# Patient Record
Sex: MALE | Race: WHITE | NOT HISPANIC OR LATINO
[De-identification: names, ages, dates, MRNs, and addresses within clinical notes are randomized per-mention and may not be internally consistent; named-entity substitution may affect disease eponyms.]

---

## 2017-01-09 ENCOUNTER — APPOINTMENT (OUTPATIENT)
Dept: UROLOGY | Facility: CLINIC | Age: 72
End: 2017-01-09

## 2017-01-18 ENCOUNTER — FORM ENCOUNTER (OUTPATIENT)
Age: 72
End: 2017-01-18

## 2017-01-18 LAB
APPEARANCE: ABNORMAL
BACTERIA UR CULT: ABNORMAL
BACTERIA: NEGATIVE
BILIRUBIN URINE: NEGATIVE
BLOOD URINE: NEGATIVE
COLOR: ABNORMAL
GLUCOSE QUALITATIVE U: NORMAL MG/DL
HYALINE CASTS: 0 /LPF
KETONES URINE: NEGATIVE
LEUKOCYTE ESTERASE URINE: NEGATIVE
MICROSCOPIC-UA: NORMAL
NITRITE URINE: NEGATIVE
PH URINE: 5.5
PROTEIN URINE: NEGATIVE MG/DL
RED BLOOD CELLS URINE: 0 /HPF
SPECIFIC GRAVITY URINE: 1.03
SQUAMOUS EPITHELIAL CELLS: 1 /HPF
UROBILINOGEN URINE: NORMAL MG/DL
WHITE BLOOD CELLS URINE: 0 /HPF

## 2017-01-19 ENCOUNTER — OUTPATIENT (OUTPATIENT)
Dept: OUTPATIENT SERVICES | Facility: HOSPITAL | Age: 72
LOS: 1 days | End: 2017-01-19
Payer: MEDICARE

## 2017-01-19 ENCOUNTER — APPOINTMENT (OUTPATIENT)
Dept: CT IMAGING | Facility: IMAGING CENTER | Age: 72
End: 2017-01-19

## 2017-01-19 DIAGNOSIS — Z96.643 PRESENCE OF ARTIFICIAL HIP JOINT, BILATERAL: Chronic | ICD-10-CM

## 2017-01-19 DIAGNOSIS — Z00.8 ENCOUNTER FOR OTHER GENERAL EXAMINATION: ICD-10-CM

## 2017-01-19 PROCEDURE — 74174 CTA ABD&PLVS W/CONTRAST: CPT

## 2017-01-27 ENCOUNTER — APPOINTMENT (OUTPATIENT)
Dept: INTERNAL MEDICINE | Facility: CLINIC | Age: 72
End: 2017-01-27

## 2017-01-27 VITALS
HEIGHT: 68 IN | WEIGHT: 198 LBS | OXYGEN SATURATION: 97 % | SYSTOLIC BLOOD PRESSURE: 100 MMHG | DIASTOLIC BLOOD PRESSURE: 80 MMHG | HEART RATE: 67 BPM | BODY MASS INDEX: 30.01 KG/M2 | TEMPERATURE: 97.7 F

## 2017-01-27 DIAGNOSIS — A49.9 URINARY TRACT INFECTION, SITE NOT SPECIFIED: ICD-10-CM

## 2017-01-27 DIAGNOSIS — Z11.59 ENCOUNTER FOR SCREENING FOR OTHER VIRAL DISEASES: ICD-10-CM

## 2017-01-27 DIAGNOSIS — N39.0 URINARY TRACT INFECTION, SITE NOT SPECIFIED: ICD-10-CM

## 2017-01-27 LAB
BILIRUB UR QL STRIP: NORMAL
GLUCOSE UR-MCNC: NORMAL
HCG UR QL: 0.2 EU/DL
HGB UR QL STRIP.AUTO: NORMAL
KETONES UR-MCNC: NORMAL
LEUKOCYTE ESTERASE UR QL STRIP: NORMAL
NITRITE UR QL STRIP: NORMAL
PH UR STRIP: 5.5
PROT UR STRIP-MCNC: NORMAL
SP GR UR STRIP: 1.02

## 2017-01-27 RX ORDER — SULFAMETHOXAZOLE AND TRIMETHOPRIM 800; 160 MG/1; MG/1
800-160 TABLET ORAL
Qty: 10 | Refills: 0 | Status: COMPLETED | COMMUNITY
Start: 2017-01-18 | End: 2017-01-27

## 2017-01-30 LAB
25(OH)D3 SERPL-MCNC: 39.3 NG/ML
ALBUMIN SERPL ELPH-MCNC: 4.3 G/DL
ALP BLD-CCNC: 57 U/L
ALT SERPL-CCNC: 26 U/L
ANION GAP SERPL CALC-SCNC: 14 MMOL/L
AST SERPL-CCNC: 19 U/L
BASOPHILS # BLD AUTO: 0.04 K/UL
BASOPHILS NFR BLD AUTO: 0.6 %
BILIRUB SERPL-MCNC: 0.6 MG/DL
BUN SERPL-MCNC: 24 MG/DL
CALCIUM SERPL-MCNC: 9.1 MG/DL
CHLORIDE SERPL-SCNC: 104 MMOL/L
CHOLEST SERPL-MCNC: 182 MG/DL
CHOLEST/HDLC SERPL: 4 RATIO
CO2 SERPL-SCNC: 21 MMOL/L
CREAT SERPL-MCNC: 1.31 MG/DL
EOSINOPHIL # BLD AUTO: 0.15 K/UL
EOSINOPHIL NFR BLD AUTO: 2.3 %
GLUCOSE SERPL-MCNC: 101 MG/DL
HBA1C MFR BLD HPLC: 5.7 %
HCT VFR BLD CALC: 41.6 %
HDLC SERPL-MCNC: 45 MG/DL
HGB BLD-MCNC: 13.8 G/DL
IMM GRANULOCYTES NFR BLD AUTO: 0.2 %
LDLC SERPL CALC-MCNC: 122 MG/DL
LYMPHOCYTES # BLD AUTO: 1.42 K/UL
LYMPHOCYTES NFR BLD AUTO: 21.8 %
MAN DIFF?: NORMAL
MCHC RBC-ENTMCNC: 31 PG
MCHC RBC-ENTMCNC: 33.2 GM/DL
MCV RBC AUTO: 93.5 FL
MONOCYTES # BLD AUTO: 0.53 K/UL
MONOCYTES NFR BLD AUTO: 8.1 %
NEUTROPHILS # BLD AUTO: 4.36 K/UL
NEUTROPHILS NFR BLD AUTO: 67 %
PLATELET # BLD AUTO: 179 K/UL
POTASSIUM SERPL-SCNC: 4.4 MMOL/L
PROT SERPL-MCNC: 7.6 G/DL
PSA SERPL-MCNC: 1.6 NG/ML
RBC # BLD: 4.45 M/UL
RBC # FLD: 13.7 %
SODIUM SERPL-SCNC: 139 MMOL/L
T4 FREE SERPL-MCNC: 1.1 NG/DL
TRIGL SERPL-MCNC: 76 MG/DL
TSH SERPL-ACNC: 6.23 UIU/ML
WBC # FLD AUTO: 6.51 K/UL

## 2017-04-21 ENCOUNTER — APPOINTMENT (OUTPATIENT)
Dept: OPHTHALMOLOGY | Facility: CLINIC | Age: 72
End: 2017-04-21

## 2017-04-21 DIAGNOSIS — H26.9 UNSPECIFIED CATARACT: ICD-10-CM

## 2017-04-23 ENCOUNTER — RESULT REVIEW (OUTPATIENT)
Age: 72
End: 2017-04-23

## 2017-04-24 ENCOUNTER — OUTPATIENT (OUTPATIENT)
Dept: OUTPATIENT SERVICES | Facility: HOSPITAL | Age: 72
LOS: 1 days | End: 2017-04-24
Payer: MEDICARE

## 2017-04-24 ENCOUNTER — APPOINTMENT (OUTPATIENT)
Dept: GASTROENTEROLOGY | Facility: HOSPITAL | Age: 72
End: 2017-04-24

## 2017-04-24 DIAGNOSIS — Z80.0 FAMILY HISTORY OF MALIGNANT NEOPLASM OF DIGESTIVE ORGANS: ICD-10-CM

## 2017-04-24 DIAGNOSIS — Z96.643 PRESENCE OF ARTIFICIAL HIP JOINT, BILATERAL: Chronic | ICD-10-CM

## 2017-04-24 DIAGNOSIS — Z12.11 ENCOUNTER FOR SCREENING FOR MALIGNANT NEOPLASM OF COLON: ICD-10-CM

## 2017-04-24 PROCEDURE — 45380 COLONOSCOPY AND BIOPSY: CPT | Mod: PT

## 2017-04-24 PROCEDURE — 88305 TISSUE EXAM BY PATHOLOGIST: CPT | Mod: 26

## 2017-04-24 PROCEDURE — 88305 TISSUE EXAM BY PATHOLOGIST: CPT

## 2017-04-24 PROCEDURE — 43235 EGD DIAGNOSTIC BRUSH WASH: CPT

## 2017-04-24 PROCEDURE — 45380 COLONOSCOPY AND BIOPSY: CPT

## 2017-04-25 LAB — SURGICAL PATHOLOGY STUDY: SIGNIFICANT CHANGE UP

## 2017-07-05 ENCOUNTER — RX RENEWAL (OUTPATIENT)
Age: 72
End: 2017-07-05

## 2017-07-06 ENCOUNTER — MEDICATION RENEWAL (OUTPATIENT)
Age: 72
End: 2017-07-06

## 2017-09-13 ENCOUNTER — APPOINTMENT (OUTPATIENT)
Dept: OPHTHALMOLOGY | Facility: CLINIC | Age: 72
End: 2017-09-13

## 2017-09-28 ENCOUNTER — APPOINTMENT (OUTPATIENT)
Dept: VASCULAR SURGERY | Facility: CLINIC | Age: 72
End: 2017-09-28
Payer: MEDICARE

## 2017-09-28 VITALS
SYSTOLIC BLOOD PRESSURE: 119 MMHG | BODY MASS INDEX: 30.16 KG/M2 | HEIGHT: 68 IN | TEMPERATURE: 97.7 F | HEART RATE: 73 BPM | WEIGHT: 199 LBS | DIASTOLIC BLOOD PRESSURE: 79 MMHG

## 2017-09-28 PROCEDURE — 93978 VASCULAR STUDY: CPT

## 2017-09-28 PROCEDURE — 99213 OFFICE O/P EST LOW 20 MIN: CPT | Mod: 25

## 2017-10-02 ENCOUNTER — APPOINTMENT (OUTPATIENT)
Dept: OPHTHALMOLOGY | Facility: CLINIC | Age: 72
End: 2017-10-02
Payer: MEDICARE

## 2017-10-02 DIAGNOSIS — H26.9 UNSPECIFIED CATARACT: ICD-10-CM

## 2017-10-02 DIAGNOSIS — Z98.890 OTHER SPECIFIED POSTPROCEDURAL STATES: ICD-10-CM

## 2017-10-02 PROCEDURE — 92136 OPHTHALMIC BIOMETRY: CPT

## 2017-10-02 PROCEDURE — 92014 COMPRE OPH EXAM EST PT 1/>: CPT

## 2017-10-02 PROCEDURE — 92025 CPTRIZED CORNEAL TOPOGRAPHY: CPT

## 2017-12-29 ENCOUNTER — APPOINTMENT (OUTPATIENT)
Dept: INTERNAL MEDICINE | Facility: CLINIC | Age: 72
End: 2017-12-29
Payer: MEDICARE

## 2017-12-29 VITALS
TEMPERATURE: 97.6 F | BODY MASS INDEX: 30.61 KG/M2 | DIASTOLIC BLOOD PRESSURE: 80 MMHG | WEIGHT: 195 LBS | HEART RATE: 77 BPM | OXYGEN SATURATION: 97 % | HEIGHT: 67 IN | SYSTOLIC BLOOD PRESSURE: 110 MMHG

## 2017-12-29 PROCEDURE — 99213 OFFICE O/P EST LOW 20 MIN: CPT | Mod: 25

## 2017-12-29 PROCEDURE — G0008: CPT

## 2017-12-29 PROCEDURE — 90662 IIV NO PRSV INCREASED AG IM: CPT

## 2017-12-29 RX ORDER — POTASSIUM CITRATE 15 MEQ/1
15 MEQ TABLET, EXTENDED RELEASE ORAL
Qty: 180 | Refills: 3 | Status: DISCONTINUED | COMMUNITY
Start: 2017-01-09 | End: 2017-12-29

## 2017-12-29 RX ORDER — FINASTERIDE 5 MG/1
5 TABLET, FILM COATED ORAL DAILY
Qty: 90 | Refills: 3 | Status: DISCONTINUED | COMMUNITY
Start: 2017-01-09 | End: 2017-12-29

## 2018-02-20 ENCOUNTER — RX RENEWAL (OUTPATIENT)
Age: 73
End: 2018-02-20

## 2018-02-20 ENCOUNTER — MEDICATION RENEWAL (OUTPATIENT)
Age: 73
End: 2018-02-20

## 2018-04-02 ENCOUNTER — OUTPATIENT (OUTPATIENT)
Dept: OUTPATIENT SERVICES | Facility: HOSPITAL | Age: 73
LOS: 1 days | End: 2018-04-02
Payer: MEDICARE

## 2018-04-02 ENCOUNTER — APPOINTMENT (OUTPATIENT)
Dept: MRI IMAGING | Facility: CLINIC | Age: 73
End: 2018-04-02
Payer: MEDICARE

## 2018-04-02 DIAGNOSIS — Z96.643 PRESENCE OF ARTIFICIAL HIP JOINT, BILATERAL: Chronic | ICD-10-CM

## 2018-04-02 DIAGNOSIS — Z00.8 ENCOUNTER FOR OTHER GENERAL EXAMINATION: ICD-10-CM

## 2018-04-02 PROCEDURE — 73721 MRI JNT OF LWR EXTRE W/O DYE: CPT

## 2018-04-02 PROCEDURE — 73721 MRI JNT OF LWR EXTRE W/O DYE: CPT | Mod: 26,RT

## 2018-04-09 ENCOUNTER — APPOINTMENT (OUTPATIENT)
Dept: OPHTHALMOLOGY | Facility: CLINIC | Age: 73
End: 2018-04-09

## 2018-04-12 ENCOUNTER — OUTPATIENT (OUTPATIENT)
Dept: OUTPATIENT SERVICES | Facility: HOSPITAL | Age: 73
LOS: 1 days | End: 2018-04-12
Payer: MEDICARE

## 2018-04-12 VITALS
HEART RATE: 63 BPM | RESPIRATION RATE: 16 BRPM | SYSTOLIC BLOOD PRESSURE: 123 MMHG | OXYGEN SATURATION: 93 % | WEIGHT: 199.96 LBS | HEIGHT: 69 IN | DIASTOLIC BLOOD PRESSURE: 80 MMHG | TEMPERATURE: 98 F

## 2018-04-12 DIAGNOSIS — M17.11 UNILATERAL PRIMARY OSTEOARTHRITIS, RIGHT KNEE: ICD-10-CM

## 2018-04-12 DIAGNOSIS — M25.561 PAIN IN RIGHT KNEE: ICD-10-CM

## 2018-04-12 DIAGNOSIS — S83.281D OTHER TEAR OF LATERAL MENISCUS, CURRENT INJURY, RIGHT KNEE, SUBSEQUENT ENCOUNTER: ICD-10-CM

## 2018-04-12 DIAGNOSIS — I71.4 ABDOMINAL AORTIC ANEURYSM, WITHOUT RUPTURE: Chronic | ICD-10-CM

## 2018-04-12 DIAGNOSIS — Z01.818 ENCOUNTER FOR OTHER PREPROCEDURAL EXAMINATION: ICD-10-CM

## 2018-04-12 DIAGNOSIS — Z96.643 PRESENCE OF ARTIFICIAL HIP JOINT, BILATERAL: Chronic | ICD-10-CM

## 2018-04-12 LAB
ANION GAP SERPL CALC-SCNC: 7 MMOL/L — SIGNIFICANT CHANGE UP (ref 5–17)
BUN SERPL-MCNC: 29 MG/DL — HIGH (ref 7–23)
CALCIUM SERPL-MCNC: 8.8 MG/DL — SIGNIFICANT CHANGE UP (ref 8.4–10.5)
CHLORIDE SERPL-SCNC: 106 MMOL/L — SIGNIFICANT CHANGE UP (ref 96–108)
CO2 SERPL-SCNC: 24 MMOL/L — SIGNIFICANT CHANGE UP (ref 22–31)
CREAT SERPL-MCNC: 1.06 MG/DL — SIGNIFICANT CHANGE UP (ref 0.5–1.3)
GLUCOSE SERPL-MCNC: 99 MG/DL — SIGNIFICANT CHANGE UP (ref 70–99)
HCT VFR BLD CALC: 37.8 % — LOW (ref 39–50)
HGB BLD-MCNC: 13.3 G/DL — SIGNIFICANT CHANGE UP (ref 13–17)
MCHC RBC-ENTMCNC: 32 PG — SIGNIFICANT CHANGE UP (ref 27–34)
MCHC RBC-ENTMCNC: 35.2 GM/DL — SIGNIFICANT CHANGE UP (ref 32–36)
MCV RBC AUTO: 91.2 FL — SIGNIFICANT CHANGE UP (ref 80–100)
PLATELET # BLD AUTO: 165 K/UL — SIGNIFICANT CHANGE UP (ref 150–400)
POTASSIUM SERPL-MCNC: 4.2 MMOL/L — SIGNIFICANT CHANGE UP (ref 3.5–5.3)
POTASSIUM SERPL-SCNC: 4.2 MMOL/L — SIGNIFICANT CHANGE UP (ref 3.5–5.3)
RBC # BLD: 4.14 M/UL — LOW (ref 4.2–5.8)
RBC # FLD: 12.4 % — SIGNIFICANT CHANGE UP (ref 10.3–14.5)
SODIUM SERPL-SCNC: 137 MMOL/L — SIGNIFICANT CHANGE UP (ref 135–145)
WBC # BLD: 6.9 K/UL — SIGNIFICANT CHANGE UP (ref 3.8–10.5)
WBC # FLD AUTO: 6.9 K/UL — SIGNIFICANT CHANGE UP (ref 3.8–10.5)

## 2018-04-12 PROCEDURE — G0463: CPT

## 2018-04-12 PROCEDURE — 93005 ELECTROCARDIOGRAM TRACING: CPT

## 2018-04-12 PROCEDURE — 93010 ELECTROCARDIOGRAM REPORT: CPT | Mod: NC

## 2018-04-12 PROCEDURE — 80048 BASIC METABOLIC PNL TOTAL CA: CPT

## 2018-04-12 PROCEDURE — 85027 COMPLETE CBC AUTOMATED: CPT

## 2018-04-12 RX ORDER — CHLORHEXIDINE GLUCONATE 213 G/1000ML
1 SOLUTION TOPICAL ONCE
Qty: 0 | Refills: 0 | Status: DISCONTINUED | OUTPATIENT
Start: 2018-04-12 | End: 2018-04-27

## 2018-04-12 RX ORDER — CHLORHEXIDINE GLUCONATE 213 G/1000ML
1 SOLUTION TOPICAL ONCE
Qty: 0 | Refills: 0 | Status: COMPLETED | OUTPATIENT
Start: 2018-04-24 | End: 2018-04-24

## 2018-04-12 NOTE — H&P PST ADULT - MUSCULOSKELETAL
details… detailed exam no calf tenderness/no joint erythema/decreased ROM due to pain/no joint warmth

## 2018-04-12 NOTE — H&P PST ADULT - PROBLEM SELECTOR PLAN 1
"Operative arthroscopy right knee" on 4/24/18  pre op instructions were reviewed and signed  Patient will obtain medical clearance

## 2018-04-12 NOTE — ASU DISCHARGE PLAN (ADULT/PEDIATRIC). - SPECIAL INSTRUCTIONS
apply ice to operative site 20 minutes on and 20 minutes off for next 48 hours  Pain meds as per MD  Take an over the counter stool softener like Colace to avoid constipation while taking a narcotic for pain Apply ice to operative site 20 minutes on and 20 minutes off for next 48 hours  Pain meds as per MD  Take an over the counter stool softener like Colace to avoid constipation while taking a narcotic for pain    FOLLOW enclosed knee arthroscopy brochure.

## 2018-04-12 NOTE — H&P PST ADULT - NSANTHOSAYNRD_GEN_A_CORE
No. ARASH screening performed.  STOP BANG Legend: 0-2 = LOW Risk; 3-4 = INTERMEDIATE Risk; 5-8 = HIGH Risk

## 2018-04-12 NOTE — H&P PST ADULT - HISTORY OF PRESENT ILLNESS
71 yo male is scheduled for 'Operative arthroscopy right knee" on 4/24/18 with Portillo Rincon MD.  Patient with right knee pain for 6 weeks, diagnosed with meniscus tear.  Pain worst with stair climbing and rates 9/10.

## 2018-04-12 NOTE — ASU DISCHARGE PLAN (ADULT/PEDIATRIC). - NOTIFY
Inability to Tolerate Liquids or Foods/Swelling that continues/Increased Irritability or Sluggishness/Numbness, tingling/Unable to Urinate/Bleeding that does not stop/Numbness, color, or temperature change to extremity/Persistent Nausea and Vomiting/Pain not relieved by Medications/Fever greater than 101

## 2018-04-12 NOTE — H&P PST ADULT - NEGATIVE ENMT SYMPTOMS
no gum bleeding/no throat pain/no hearing difficulty/no vertigo/no sinus symptoms/no nasal congestion/no ear pain/no recurrent cold sores/no nasal obstruction/no post-nasal discharge/no nose bleeds/no abnormal taste sensation/no dry mouth/no tinnitus/no nasal discharge/no dysphagia

## 2018-04-12 NOTE — ASU DISCHARGE PLAN (ADULT/PEDIATRIC). - FOLLOWUP APPOINTMENT CLINIC/PHYSICIAN
Call Dr. Vivas' office [phone] to make an appointment to be seen within 7-10 days Call Dr. Rincon's office [phone] to make an appointment to be seen within 7-10 days

## 2018-04-12 NOTE — ASU DISCHARGE PLAN (ADULT/PEDIATRIC). - ACTIVITY LEVEL
no sports/gym/no heavy lifting/no tub baths/weight bearing as tolerated/elevate extremity exercise/no tub baths/as per brochure/weight bearing as tolerated/elevate extremity

## 2018-04-12 NOTE — ASU DISCHARGE PLAN (ADULT/PEDIATRIC). - MEDICATION SUMMARY - MEDICATIONS TO TAKE
I will START or STAY ON the medications listed below when I get home from the hospital:    Percocet 5/325 oral tablet  -- 1 tab(s) by mouth every 4 hours, As Needed -for moderate pain MDD:6  -- Caution federal law prohibits the transfer of this drug to any person other  than the person for whom it was prescribed.  May cause drowsiness.  Alcohol may intensify this effect.  Use care when operating dangerous machinery.  This prescription cannot be refilled.  This product contains acetaminophen.  Do not use  with any other product containing acetaminophen to prevent possible liver damage.  Using more of this medication than prescribed may cause serious breathing problems.    -- Indication: For moderate to severe knee pain    losartan  -- orally once a day  -- Indication: For high blood pressure    atorvastatin  -- orally once a day  -- Indication: For high cholesterol

## 2018-04-12 NOTE — H&P PST ADULT - PMH
AAA (abdominal aortic aneurysm)  last sonogram 3/2018 :4.9  Dyslipidemia    Hypertension    Iliac artery aneurysm, left  last sonogram 3/2018; 3.5 cm  Right knee pain    Varicose veins

## 2018-04-16 ENCOUNTER — APPOINTMENT (OUTPATIENT)
Dept: INTERNAL MEDICINE | Facility: CLINIC | Age: 73
End: 2018-04-16
Payer: MEDICARE

## 2018-04-16 VITALS
TEMPERATURE: 98.2 F | DIASTOLIC BLOOD PRESSURE: 60 MMHG | BODY MASS INDEX: 30.76 KG/M2 | SYSTOLIC BLOOD PRESSURE: 110 MMHG | OXYGEN SATURATION: 96 % | HEART RATE: 88 BPM | WEIGHT: 196 LBS | HEIGHT: 67 IN

## 2018-04-16 DIAGNOSIS — R19.7 DIARRHEA, UNSPECIFIED: ICD-10-CM

## 2018-04-16 PROCEDURE — 99214 OFFICE O/P EST MOD 30 MIN: CPT

## 2018-04-16 PROCEDURE — 93000 ELECTROCARDIOGRAM COMPLETE: CPT

## 2018-04-16 RX ORDER — TAMSULOSIN HYDROCHLORIDE 0.4 MG/1
0.4 CAPSULE ORAL
Qty: 180 | Refills: 3 | Status: COMPLETED | COMMUNITY
Start: 2017-01-09 | End: 2018-04-16

## 2018-04-23 ENCOUNTER — TRANSCRIPTION ENCOUNTER (OUTPATIENT)
Age: 73
End: 2018-04-23

## 2018-04-24 ENCOUNTER — RESULT REVIEW (OUTPATIENT)
Age: 73
End: 2018-04-24

## 2018-04-24 ENCOUNTER — OUTPATIENT (OUTPATIENT)
Dept: OUTPATIENT SERVICES | Facility: HOSPITAL | Age: 73
LOS: 1 days | End: 2018-04-24
Payer: MEDICARE

## 2018-04-24 VITALS
TEMPERATURE: 98 F | WEIGHT: 201.28 LBS | OXYGEN SATURATION: 100 % | HEIGHT: 75 IN | HEART RATE: 67 BPM | SYSTOLIC BLOOD PRESSURE: 133 MMHG | RESPIRATION RATE: 18 BRPM | DIASTOLIC BLOOD PRESSURE: 80 MMHG

## 2018-04-24 VITALS
SYSTOLIC BLOOD PRESSURE: 161 MMHG | RESPIRATION RATE: 14 BRPM | DIASTOLIC BLOOD PRESSURE: 91 MMHG | HEART RATE: 80 BPM | OXYGEN SATURATION: 100 %

## 2018-04-24 DIAGNOSIS — Z96.643 PRESENCE OF ARTIFICIAL HIP JOINT, BILATERAL: Chronic | ICD-10-CM

## 2018-04-24 DIAGNOSIS — Z01.818 ENCOUNTER FOR OTHER PREPROCEDURAL EXAMINATION: ICD-10-CM

## 2018-04-24 DIAGNOSIS — S83.281D OTHER TEAR OF LATERAL MENISCUS, CURRENT INJURY, RIGHT KNEE, SUBSEQUENT ENCOUNTER: ICD-10-CM

## 2018-04-24 DIAGNOSIS — M17.11 UNILATERAL PRIMARY OSTEOARTHRITIS, RIGHT KNEE: ICD-10-CM

## 2018-04-24 PROCEDURE — G8978: CPT | Mod: CI

## 2018-04-24 PROCEDURE — G8980: CPT | Mod: CI

## 2018-04-24 PROCEDURE — 29880 ARTHRS KNE SRG MNISECTMY M&L: CPT | Mod: RT

## 2018-04-24 PROCEDURE — 97161 PT EVAL LOW COMPLEX 20 MIN: CPT | Mod: CI

## 2018-04-24 PROCEDURE — G8979: CPT | Mod: CI

## 2018-04-24 RX ORDER — SODIUM CHLORIDE 9 MG/ML
1000 INJECTION, SOLUTION INTRAVENOUS
Qty: 0 | Refills: 0 | Status: DISCONTINUED | OUTPATIENT
Start: 2018-04-24 | End: 2018-04-25

## 2018-04-24 RX ORDER — HYDROMORPHONE HYDROCHLORIDE 2 MG/ML
0.5 INJECTION INTRAMUSCULAR; INTRAVENOUS; SUBCUTANEOUS
Qty: 0 | Refills: 0 | Status: DISCONTINUED | OUTPATIENT
Start: 2018-04-24 | End: 2018-04-25

## 2018-04-24 RX ORDER — OXYCODONE HYDROCHLORIDE 5 MG/1
5 TABLET ORAL ONCE
Qty: 0 | Refills: 0 | Status: DISCONTINUED | OUTPATIENT
Start: 2018-04-24 | End: 2018-04-25

## 2018-04-24 RX ORDER — ONDANSETRON 8 MG/1
4 TABLET, FILM COATED ORAL ONCE
Qty: 0 | Refills: 0 | Status: COMPLETED | OUTPATIENT
Start: 2018-04-24 | End: 2018-04-24

## 2018-04-24 RX ORDER — CEFAZOLIN SODIUM 1 G
2000 VIAL (EA) INJECTION ONCE
Qty: 0 | Refills: 0 | Status: COMPLETED | OUTPATIENT
Start: 2018-04-24 | End: 2018-04-24

## 2018-04-24 RX ADMIN — HYDROMORPHONE HYDROCHLORIDE 0.5 MILLIGRAM(S): 2 INJECTION INTRAMUSCULAR; INTRAVENOUS; SUBCUTANEOUS at 09:25

## 2018-04-24 RX ADMIN — HYDROMORPHONE HYDROCHLORIDE 0.5 MILLIGRAM(S): 2 INJECTION INTRAMUSCULAR; INTRAVENOUS; SUBCUTANEOUS at 09:10

## 2018-04-24 RX ADMIN — SODIUM CHLORIDE 100 MILLILITER(S): 9 INJECTION, SOLUTION INTRAVENOUS at 09:16

## 2018-04-24 RX ADMIN — HYDROMORPHONE HYDROCHLORIDE 0.5 MILLIGRAM(S): 2 INJECTION INTRAMUSCULAR; INTRAVENOUS; SUBCUTANEOUS at 08:55

## 2018-04-24 RX ADMIN — HYDROMORPHONE HYDROCHLORIDE 0.5 MILLIGRAM(S): 2 INJECTION INTRAMUSCULAR; INTRAVENOUS; SUBCUTANEOUS at 09:55

## 2018-04-24 RX ADMIN — Medication 200 MILLIGRAM(S): at 13:00

## 2018-04-24 RX ADMIN — ONDANSETRON 4 MILLIGRAM(S): 8 TABLET, FILM COATED ORAL at 12:00

## 2018-04-24 RX ADMIN — CHLORHEXIDINE GLUCONATE 1 APPLICATION(S): 213 SOLUTION TOPICAL at 06:43

## 2018-04-24 NOTE — BRIEF OPERATIVE NOTE - PROCEDURE
<<-----Click on this checkbox to enter Procedure Arthroscopy of knee  04/24/2018  Right knee  Partial medial and lateral menisectomies  Chondroplasty of femoral notch  Excision Tiara Garcia

## 2018-05-21 ENCOUNTER — RX RENEWAL (OUTPATIENT)
Age: 73
End: 2018-05-21

## 2018-06-18 ENCOUNTER — APPOINTMENT (OUTPATIENT)
Dept: INTERNAL MEDICINE | Facility: CLINIC | Age: 73
End: 2018-06-18
Payer: MEDICARE

## 2018-06-18 VITALS
BODY MASS INDEX: 31.17 KG/M2 | HEART RATE: 76 BPM | WEIGHT: 199 LBS | TEMPERATURE: 98 F | SYSTOLIC BLOOD PRESSURE: 104 MMHG | OXYGEN SATURATION: 95 % | DIASTOLIC BLOOD PRESSURE: 70 MMHG

## 2018-06-18 PROCEDURE — 99214 OFFICE O/P EST MOD 30 MIN: CPT

## 2018-06-18 RX ORDER — OXYCODONE AND ACETAMINOPHEN 5; 325 MG/1; MG/1
5-325 TABLET ORAL
Qty: 30 | Refills: 0 | Status: COMPLETED | COMMUNITY
Start: 2018-04-24

## 2018-06-18 RX ORDER — CEPHALEXIN 500 MG/1
500 CAPSULE ORAL
Qty: 30 | Refills: 0 | Status: COMPLETED | COMMUNITY
Start: 2018-04-05

## 2018-06-19 LAB
FLUAV H3 RNA SPEC QL NAA+PROBE: DETECTED
RAPID RVP RESULT: DETECTED

## 2018-06-19 NOTE — PHYSICAL EXAM
[Normal Voice/Communication] : normal voice/communication [Normal Sclera/Conjunctiva] : normal sclera/conjunctiva [Normal Outer Ear/Nose] : the outer ears and nose were normal in appearance [Normal Oropharynx] : the oropharynx was normal [Normal TMs] : both tympanic membranes were normal [No Respiratory Distress] : no respiratory distress  [Clear to Auscultation] : lungs were clear to auscultation bilaterally [No Accessory Muscle Use] : no accessory muscle use [Normal Rate] : normal rate  [Regular Rhythm] : with a regular rhythm [Normal S1, S2] : normal S1 and S2 [No Edema] : there was no peripheral edema [Soft] : abdomen soft [Non Tender] : non-tender [Non-distended] : non-distended [Normal Bowel Sounds] : normal bowel sounds [Normal Posterior Cervical Nodes] : no posterior cervical lymphadenopathy [Normal Anterior Cervical Nodes] : no anterior cervical lymphadenopathy [de-identified] : No sinus tenderness.

## 2018-06-19 NOTE — ADDENDUM
[FreeTextEntry1] : 6/19/18 - RVP positive for influenza AH1.  Spoke with patient at 2:20PM and gave results.  Patient is past the window for Tamiflu.  Prophylaxis for family members was given.

## 2018-06-19 NOTE — ASSESSMENT
[FreeTextEntry1] : Patient with acute upper respiratory syndrome, likely viral.  RVP sent today.  Patient can use Mucinex DM for the cough, and he can also continue the Flonase for the post-nasal drip.  Patient is to get adequate rest/sleep, hydration, and nutrition, and call for worsening or nonresolving symptoms.

## 2018-06-19 NOTE — HISTORY OF PRESENT ILLNESS
[Spouse] : spouse [FreeTextEntry8] : Patient went to [place] ([facility], [facility], [place]) from 5/31/18 to 6/15/18.  His spouse developed a URI while he was there, and the patient himself got URI symptoms  starting Wednesday 6/13/18.  [place] is entering the winter season, and patient says the weather was mostly cold (39F) and moist.  Patient had a cough with mucus dripping down the nose posteriorly.  There was whitish phlegm (nonbloody).  He felt hot, but had no fevers or chills.  There was no dyspnea, but he had some chest pains from the coughing.  No rashes or body aches.  He had some abdominal discomfort, and also vomited x 2 (non-bloody).  There was no diarrhea.  He obtained amoxicillin (available OTC in [place]), and took 500mg three times daily from Thursday 6/14/18 to Saturday 6/16/18.  He also used Flonase.  He still gets some on and off symptoms.  No other travel.  His only sick contacts were his spouse and children, who had similar URI symptoms.

## 2018-06-20 ENCOUNTER — INPATIENT (INPATIENT)
Facility: HOSPITAL | Age: 73
LOS: 2 days | Discharge: HOME | DRG: 194 | End: 2018-06-23
Attending: STUDENT IN AN ORGANIZED HEALTH CARE EDUCATION/TRAINING PROGRAM | Admitting: STUDENT IN AN ORGANIZED HEALTH CARE EDUCATION/TRAINING PROGRAM
Payer: MEDICARE

## 2018-06-20 ENCOUNTER — MESSAGE (OUTPATIENT)
Age: 73
End: 2018-06-20

## 2018-06-20 VITALS
TEMPERATURE: 98 F | WEIGHT: 199.08 LBS | DIASTOLIC BLOOD PRESSURE: 88 MMHG | RESPIRATION RATE: 20 BRPM | SYSTOLIC BLOOD PRESSURE: 127 MMHG | HEART RATE: 100 BPM | OXYGEN SATURATION: 97 % | HEIGHT: 68 IN

## 2018-06-20 DIAGNOSIS — E78.5 HYPERLIPIDEMIA, UNSPECIFIED: ICD-10-CM

## 2018-06-20 DIAGNOSIS — I10 ESSENTIAL (PRIMARY) HYPERTENSION: ICD-10-CM

## 2018-06-20 DIAGNOSIS — J10.1 INFLUENZA DUE TO OTHER IDENTIFIED INFLUENZA VIRUS WITH OTHER RESPIRATORY MANIFESTATIONS: ICD-10-CM

## 2018-06-20 DIAGNOSIS — I72.3 ANEURYSM OF ILIAC ARTERY: ICD-10-CM

## 2018-06-20 DIAGNOSIS — N39.0 URINARY TRACT INFECTION, SITE NOT SPECIFIED: ICD-10-CM

## 2018-06-20 DIAGNOSIS — Z29.9 ENCOUNTER FOR PROPHYLACTIC MEASURES, UNSPECIFIED: ICD-10-CM

## 2018-06-20 DIAGNOSIS — Z96.643 PRESENCE OF ARTIFICIAL HIP JOINT, BILATERAL: Chronic | ICD-10-CM

## 2018-06-20 DIAGNOSIS — I71.4 ABDOMINAL AORTIC ANEURYSM, WITHOUT RUPTURE: ICD-10-CM

## 2018-06-20 LAB
ALBUMIN SERPL ELPH-MCNC: 3.8 G/DL — SIGNIFICANT CHANGE UP (ref 3.3–5)
ALP SERPL-CCNC: 68 U/L — SIGNIFICANT CHANGE UP (ref 40–120)
ALT FLD-CCNC: 17 U/L — SIGNIFICANT CHANGE UP (ref 10–45)
ANION GAP SERPL CALC-SCNC: 16 MMOL/L — SIGNIFICANT CHANGE UP (ref 5–17)
APPEARANCE UR: ABNORMAL
AST SERPL-CCNC: 19 U/L — SIGNIFICANT CHANGE UP (ref 10–40)
BASOPHILS # BLD AUTO: 0 K/UL — SIGNIFICANT CHANGE UP (ref 0–0.2)
BASOPHILS NFR BLD AUTO: 0.1 % — SIGNIFICANT CHANGE UP (ref 0–2)
BILIRUB SERPL-MCNC: 1.4 MG/DL — HIGH (ref 0.2–1.2)
BILIRUB UR-MCNC: NEGATIVE — SIGNIFICANT CHANGE UP
BUN SERPL-MCNC: 21 MG/DL — SIGNIFICANT CHANGE UP (ref 7–23)
CALCIUM SERPL-MCNC: 8.7 MG/DL — SIGNIFICANT CHANGE UP (ref 8.4–10.5)
CHLORIDE SERPL-SCNC: 98 MMOL/L — SIGNIFICANT CHANGE UP (ref 96–108)
CO2 SERPL-SCNC: 22 MMOL/L — SIGNIFICANT CHANGE UP (ref 22–31)
COLOR SPEC: YELLOW — SIGNIFICANT CHANGE UP
CREAT SERPL-MCNC: 1.27 MG/DL — SIGNIFICANT CHANGE UP (ref 0.5–1.3)
DIFF PNL FLD: ABNORMAL
EOSINOPHIL # BLD AUTO: 0 K/UL — SIGNIFICANT CHANGE UP (ref 0–0.5)
EOSINOPHIL NFR BLD AUTO: 0.1 % — SIGNIFICANT CHANGE UP (ref 0–6)
FLUAV H3 RNA SPEC QL NAA+PROBE: DETECTED
GLUCOSE SERPL-MCNC: 111 MG/DL — HIGH (ref 70–99)
GLUCOSE UR QL: NEGATIVE — SIGNIFICANT CHANGE UP
HCT VFR BLD CALC: 39.5 % — SIGNIFICANT CHANGE UP (ref 39–50)
HGB BLD-MCNC: 13.7 G/DL — SIGNIFICANT CHANGE UP (ref 13–17)
KETONES UR-MCNC: ABNORMAL
LACTATE BLDV-MCNC: 2 MMOL/L — SIGNIFICANT CHANGE UP (ref 0.7–2)
LEUKOCYTE ESTERASE UR-ACNC: ABNORMAL
LYMPHOCYTES # BLD AUTO: 1 K/UL — SIGNIFICANT CHANGE UP (ref 1–3.3)
LYMPHOCYTES # BLD AUTO: 7.6 % — LOW (ref 13–44)
MCHC RBC-ENTMCNC: 32.9 PG — SIGNIFICANT CHANGE UP (ref 27–34)
MCHC RBC-ENTMCNC: 34.7 GM/DL — SIGNIFICANT CHANGE UP (ref 32–36)
MCV RBC AUTO: 94.7 FL — SIGNIFICANT CHANGE UP (ref 80–100)
MONOCYTES # BLD AUTO: 0.8 K/UL — SIGNIFICANT CHANGE UP (ref 0–0.9)
MONOCYTES NFR BLD AUTO: 6.2 % — SIGNIFICANT CHANGE UP (ref 2–14)
NEUTROPHILS # BLD AUTO: 11.6 K/UL — HIGH (ref 1.8–7.4)
NEUTROPHILS NFR BLD AUTO: 86.1 % — HIGH (ref 43–77)
NITRITE UR-MCNC: POSITIVE
PH UR: 6 — SIGNIFICANT CHANGE UP (ref 5–8)
PLATELET # BLD AUTO: 146 K/UL — LOW (ref 150–400)
POTASSIUM SERPL-MCNC: 4 MMOL/L — SIGNIFICANT CHANGE UP (ref 3.5–5.3)
POTASSIUM SERPL-SCNC: 4 MMOL/L — SIGNIFICANT CHANGE UP (ref 3.5–5.3)
PROT SERPL-MCNC: 7.3 G/DL — SIGNIFICANT CHANGE UP (ref 6–8.3)
PROT UR-MCNC: 30 MG/DL
RAPID RVP RESULT: DETECTED
RBC # BLD: 4.18 M/UL — LOW (ref 4.2–5.8)
RBC # FLD: 12.7 % — SIGNIFICANT CHANGE UP (ref 10.3–14.5)
SODIUM SERPL-SCNC: 136 MMOL/L — SIGNIFICANT CHANGE UP (ref 135–145)
SP GR SPEC: 1.02 — SIGNIFICANT CHANGE UP (ref 1.01–1.02)
UROBILINOGEN FLD QL: NEGATIVE — SIGNIFICANT CHANGE UP
WBC # BLD: 13.5 K/UL — HIGH (ref 3.8–10.5)
WBC # FLD AUTO: 13.5 K/UL — HIGH (ref 3.8–10.5)

## 2018-06-20 PROCEDURE — 71045 X-RAY EXAM CHEST 1 VIEW: CPT | Mod: 26

## 2018-06-20 PROCEDURE — 99223 1ST HOSP IP/OBS HIGH 75: CPT | Mod: GC

## 2018-06-20 PROCEDURE — 99285 EMERGENCY DEPT VISIT HI MDM: CPT | Mod: 25

## 2018-06-20 PROCEDURE — 93010 ELECTROCARDIOGRAM REPORT: CPT

## 2018-06-20 RX ORDER — SODIUM CHLORIDE 9 MG/ML
3 INJECTION INTRAMUSCULAR; INTRAVENOUS; SUBCUTANEOUS ONCE
Qty: 0 | Refills: 0 | Status: COMPLETED | OUTPATIENT
Start: 2018-06-20 | End: 2018-06-20

## 2018-06-20 RX ORDER — ATORVASTATIN CALCIUM 80 MG/1
0 TABLET, FILM COATED ORAL
Qty: 0 | Refills: 0 | COMMUNITY

## 2018-06-20 RX ORDER — ENOXAPARIN SODIUM 100 MG/ML
40 INJECTION SUBCUTANEOUS DAILY
Qty: 0 | Refills: 0 | Status: DISCONTINUED | OUTPATIENT
Start: 2018-06-20 | End: 2018-06-23

## 2018-06-20 RX ORDER — LOSARTAN POTASSIUM 100 MG/1
50 TABLET, FILM COATED ORAL DAILY
Qty: 0 | Refills: 0 | Status: DISCONTINUED | OUTPATIENT
Start: 2018-06-20 | End: 2018-06-23

## 2018-06-20 RX ORDER — LOSARTAN POTASSIUM 100 MG/1
0 TABLET, FILM COATED ORAL
Qty: 0 | Refills: 0 | COMMUNITY

## 2018-06-20 RX ORDER — ATORVASTATIN CALCIUM 80 MG/1
20 TABLET, FILM COATED ORAL AT BEDTIME
Qty: 0 | Refills: 0 | Status: DISCONTINUED | OUTPATIENT
Start: 2018-06-20 | End: 2018-06-23

## 2018-06-20 RX ORDER — CEFTRIAXONE 500 MG/1
1 INJECTION, POWDER, FOR SOLUTION INTRAMUSCULAR; INTRAVENOUS EVERY 24 HOURS
Qty: 0 | Refills: 0 | Status: DISCONTINUED | OUTPATIENT
Start: 2018-06-21 | End: 2018-06-23

## 2018-06-20 RX ORDER — LOSARTAN POTASSIUM 100 MG/1
50 TABLET, FILM COATED ORAL DAILY
Qty: 0 | Refills: 0 | Status: DISCONTINUED | OUTPATIENT
Start: 2018-06-20 | End: 2018-06-20

## 2018-06-20 RX ORDER — SODIUM CHLORIDE 9 MG/ML
500 INJECTION INTRAMUSCULAR; INTRAVENOUS; SUBCUTANEOUS ONCE
Qty: 0 | Refills: 0 | Status: COMPLETED | OUTPATIENT
Start: 2018-06-20 | End: 2018-06-20

## 2018-06-20 RX ORDER — ACETAMINOPHEN 500 MG
650 TABLET ORAL EVERY 6 HOURS
Qty: 0 | Refills: 0 | Status: DISCONTINUED | OUTPATIENT
Start: 2018-06-20 | End: 2018-06-23

## 2018-06-20 RX ORDER — SODIUM CHLORIDE 9 MG/ML
500 INJECTION INTRAMUSCULAR; INTRAVENOUS; SUBCUTANEOUS
Qty: 0 | Refills: 0 | Status: COMPLETED | OUTPATIENT
Start: 2018-06-20 | End: 2018-06-20

## 2018-06-20 RX ORDER — ACETAMINOPHEN 500 MG
650 TABLET ORAL ONCE
Qty: 0 | Refills: 0 | Status: COMPLETED | OUTPATIENT
Start: 2018-06-20 | End: 2018-06-20

## 2018-06-20 RX ORDER — CEFTRIAXONE 500 MG/1
1 INJECTION, POWDER, FOR SOLUTION INTRAMUSCULAR; INTRAVENOUS ONCE
Qty: 0 | Refills: 0 | Status: COMPLETED | OUTPATIENT
Start: 2018-06-20 | End: 2018-06-20

## 2018-06-20 RX ADMIN — SODIUM CHLORIDE 2000 MILLILITER(S): 9 INJECTION INTRAMUSCULAR; INTRAVENOUS; SUBCUTANEOUS at 12:44

## 2018-06-20 RX ADMIN — SODIUM CHLORIDE 3 MILLILITER(S): 9 INJECTION INTRAMUSCULAR; INTRAVENOUS; SUBCUTANEOUS at 12:41

## 2018-06-20 RX ADMIN — Medication 650 MILLIGRAM(S): at 12:44

## 2018-06-20 RX ADMIN — SODIUM CHLORIDE 750 MILLILITER(S): 9 INJECTION INTRAMUSCULAR; INTRAVENOUS; SUBCUTANEOUS at 14:46

## 2018-06-20 RX ADMIN — SODIUM CHLORIDE 2000 MILLILITER(S): 9 INJECTION INTRAMUSCULAR; INTRAVENOUS; SUBCUTANEOUS at 13:33

## 2018-06-20 RX ADMIN — ATORVASTATIN CALCIUM 20 MILLIGRAM(S): 80 TABLET, FILM COATED ORAL at 23:07

## 2018-06-20 RX ADMIN — SODIUM CHLORIDE 2000 MILLILITER(S): 9 INJECTION INTRAMUSCULAR; INTRAVENOUS; SUBCUTANEOUS at 12:43

## 2018-06-20 RX ADMIN — Medication 650 MILLIGRAM(S): at 18:10

## 2018-06-20 RX ADMIN — Medication 75 MILLIGRAM(S): at 18:10

## 2018-06-20 RX ADMIN — CEFTRIAXONE 100 GRAM(S): 500 INJECTION, POWDER, FOR SOLUTION INTRAMUSCULAR; INTRAVENOUS at 14:46

## 2018-06-20 RX ADMIN — ENOXAPARIN SODIUM 40 MILLIGRAM(S): 100 INJECTION SUBCUTANEOUS at 18:10

## 2018-06-20 RX ADMIN — SODIUM CHLORIDE 2000 MILLILITER(S): 9 INJECTION INTRAMUSCULAR; INTRAVENOUS; SUBCUTANEOUS at 13:32

## 2018-06-20 NOTE — H&P ADULT - NSHPLABSRESULTS_GEN_ALL_CORE
.  LABS:                         13.7   13.5  )-----------( 146      ( 2018 12:54 )             39.5     06-20    136  |  98  |  21  ----------------------------<  111<H>  4.0   |  22  |  1.27    Ca    8.7      2018 12:54    TPro  7.3  /  Alb  3.8  /  TBili  1.4<H>  /  DBili  x   /  AST  19  /  ALT  17  /  AlkPhos  68  06-20      Urinalysis Basic - ( 2018 13:42 )    Color: Yellow / Appearance: SL Turbid / S.023 / pH: x  Gluc: x / Ketone: Trace  / Bili: Negative / Urobili: Negative   Blood: x / Protein: 30 mg/dL / Nitrite: Positive   Leuk Esterase: Large / RBC: 5-10 /HPF / WBC >50 /HPF   Sq Epi: x / Non Sq Epi: Occasional /HPF / Bacteria: Many /HPF            RADIOLOGY, EKG & ADDITIONAL TESTS personally Reviewed.   EKG: NSR with LVH  CXR: no focal consolidation

## 2018-06-20 NOTE — H&P ADULT - PROBLEM SELECTOR PLAN 1
-dysuria with UA positive for nitrites, leuk esterase, and bacteria  -cont. ceftriaxone IV  -f/u urine cultures   -plan for switch to abx PO tomorrow  -cont. IVF  -trend CBC -dysuria with UA positive for nitrites, leuk esterase, and bacteria  -cont. ceftriaxone IV  -f/u urine cultures   -f/u blood cultures  -plan for switch to abx PO tomorrow  -cont. IVF  -trend CBC

## 2018-06-20 NOTE — H&P ADULT - NSHPSOCIALHISTORY_GEN_ALL_CORE
Recently traveled to [place] with his wife; denies eating street food, reports mostly fine dining. Works as a . Denies smoking or recreational drug use. Reports occasional alcohol use.

## 2018-06-20 NOTE — H&P ADULT - PMH
AAA (abdominal aortic aneurysm)  last sonogram 3/2018 :4.9  Dyslipidemia    Hypertension    Iliac artery aneurysm, left  last sonogram 3/2018; 3.5 cm  Nephrolithiasis    Right knee pain    Varicose veins

## 2018-06-20 NOTE — H&P ADULT - PROBLEM SELECTOR PLAN 3
-cont. home atorvastatin 80mg PO daily  -cont. home losartan 50mg PO daily -cont. home atorvastatin 20mg PO daily  -cont. home losartan 50mg PO daily

## 2018-06-20 NOTE — H&P ADULT - NSHPPHYSICALEXAM_GEN_ALL_CORE
Vital Signs Last 24 Hrs  T(C): 37.2 (20 Jun 2018 14:48), Max: 37.9 (20 Jun 2018 12:05)  T(F): 99 (20 Jun 2018 14:48), Max: 100.3 (20 Jun 2018 12:05)  HR: 76 (20 Jun 2018 14:48) (76 - 100)  BP: 107/65 (20 Jun 2018 14:48) (107/65 - 130/80)  BP(mean): --  RR: 18 (20 Jun 2018 14:48) (18 - 20)  SpO2: 95% (20 Jun 2018 14:48) (95% - 98%)\      PHYSICAL EXAM:  GENERAL: NAD, well-groomed, well-developed  HEAD:  Atraumatic, Normocephalic  EYES: EOMI, PERRLA, conjunctiva and sclera clear  ENMT: No tonsillar erythema, exudates, or enlargement; Moist mucous membranes,   NECK: Supple, No JVD, Normal thyroid  HEART: Regular rate and rhythm; No murmurs, rubs, or gallops  RESPIRATORY: CTA B/L, No W/R/R  ABDOMEN: Soft, Nontender, Nondistended; Bowel sounds present  NEUROLOGY: A&Ox3, nonfocal, CN II-XII grossly intact, sensation intact, no gait abnormalities bilaterally;  EXTREMITIES:  2+ Peripheral Pulses, No clubbing, cyanosis, or edema  SKIN: warm, dry, normal color, no rash or abnormal lesions Vital Signs Last 24 Hrs  T(C): 37.2 (20 Jun 2018 14:48), Max: 37.9 (20 Jun 2018 12:05)  T(F): 99 (20 Jun 2018 14:48), Max: 100.3 (20 Jun 2018 12:05)  HR: 76 (20 Jun 2018 14:48) (76 - 100)  BP: 107/65 (20 Jun 2018 14:48) (107/65 - 130/80)  BP(mean): --  RR: 18 (20 Jun 2018 14:48) (18 - 20)  SpO2: 95% (20 Jun 2018 14:48) (95% - 98%)\      PHYSICAL EXAM:  GENERAL: NAD, well-groomed, well-developed  HEAD:  Atraumatic, Normocephalic  EYES: EOMI, PERRLA, conjunctiva and sclera clear  ENMT: No tonsillar erythema, exudates, or enlargement; Moist mucous membranes,   NECK: Supple, No JVD, Normal thyroid  HEART: Regular rate and rhythm; No murmurs, rubs, or gallops  RESPIRATORY: CTA B/L, No W/R/R  ABDOMEN: Suprapubic tendernes. Abdomen mildly distended and soft. +BS  NEUROLOGY: A&Ox3, nonfocal, CN II-XII grossly intact, sensation intact, no gait abnormalities bilaterally;  EXTREMITIES:  2+ Peripheral Pulses, No clubbing, cyanosis, or edema  SKIN: warm, dry, normal color, no rash or abnormal lesions

## 2018-06-20 NOTE — H&P ADULT - PROBLEM SELECTOR PLAN 2
-h/o cough with RVP positive for influenza A  -give tamiflu dose  -acetaminophen PRN for pain  -cough suppressants

## 2018-06-20 NOTE — ED ADULT NURSE REASSESSMENT NOTE - NS ED NURSE REASSESS COMMENT FT1
Patient states he is feeling a little better. States his urinary symptoms have improved. States his cough has also improved.

## 2018-06-20 NOTE — ED PROVIDER NOTE - CARE PLAN
Principal Discharge DX:	Influenza A  Secondary Diagnosis:	Urinary tract infection with hematuria, site unspecified

## 2018-06-20 NOTE — H&P ADULT - NSHPREVIEWOFSYSTEMS_GEN_ALL_CORE
REVIEW OF SYSTEMS:  CONSTITUTIONAL: No fever, weight loss, or fatigue  EYES: No eye pain, visual disturbances, or discharge  ENMT:  No difficulty hearing; No sinus or throat pain  NECK: No pain or stiffness  RESPIRATORY: No cough, wheezing, chills or hemoptysis; No shortness of breath  CARDIOVASCULAR: No chest pain, palpitations, dizziness, or leg swelling  GASTROINTESTINAL: No abdominal or epigastric pain. No nausea, vomiting, or hematemesis; No diarrhea or constipation. No melena or hematochezia.  GENITOURINARY: No dysuria, frequency, hematuria, or incontinence  NEUROLOGICAL: No headaches, memory loss, loss of strength, numbness, or tremors  SKIN: No itching, burning, rashes, or lesions   LYMPH NODES: No enlarged glands  ENDOCRINE: No heat or cold intolerance; No hair loss  MUSCULOSKELETAL: No joint pain or swelling; No muscle, back, or extremity pain  PSYCHIATRIC: No depression, anxiety, mood swings, or difficulty sleeping REVIEW OF SYSTEMS:  CONSTITUTIONAL: +fever, no weight loss  EYES: No eye pain or visual disturbances  ENMT:  No difficulty hearing; No sinus or throat pain  RESPIRATORY: +cough, no hemoptysis; No shortness of breath  CARDIOVASCULAR: No chest pain, palpitations, dizziness, or leg swelling  GASTROINTESTINAL: +abdominal pain brought on by cough. + NBNB vomiting ; No diarrhea or constipation. No melena or hematochezia.  GENITOURINARY: +dysuria and increased frequency; no hematuria, or incontinence  NEUROLOGICAL: +headaches; No memory loss, loss of strength, numbness, or tremors  SKIN: No itching, burning, rashes, or lesions   LYMPH NODES: No enlarged glands  MUSCULOSKELETAL: +muscle aches in thighs bl; No joint pain or swelling; No back, or extremity pain

## 2018-06-20 NOTE — ED PROVIDER NOTE - ATTENDING CONTRIBUTION TO CARE
pt is a 72 y/o male with h/o htn, dyslipedmia, aaa presents with confirmed flu like sts with vss but myalgia, cough, urinary complaint, septic work up ordered lactate 2, 30cc/kg bolus, apap, reassess likely discharge.

## 2018-06-20 NOTE — ED PROVIDER NOTE - OBJECTIVE STATEMENT
73 year old male who is seen by Dr. Keyon MEDINA and sent in for worsening symptoms of cough, weakness (diagnosed with influenza A1 x 6 days) and now new onset urinary discomfort.  Patient is not hydrating and eating as frequently.  [place] any chest pain, headache, dizziness. Admits to fevers, chills for the past 4 days.

## 2018-06-20 NOTE — H&P ADULT - ASSESSMENT
Patient is a 73M w/ PMH AAA, IAA, nephrolithiasis, HTN, and HLD presenting with 1 day of dysuria most likely due to UTI and 6 days of cough mostly likely due to influenza URI, alternate diagnoses include pneumonia which is less likely with clear CXR.

## 2018-06-20 NOTE — H&P ADULT - HISTORY OF PRESENT ILLNESS
Patient is a 73M w/ PMH AAA, IAA, nephrolithiasis, HTN, and HLD presenting with 1 day of dysuria and 6 days of cough. Patient reports a burning pain with urination that began yesterday morning and does not radiate. He denies flank or groin pain. He denies blood in urine, describing it as 'orange.' He reports returning from [place] 4 days ago and that both he and his wife developed coughing, vomiting, headache, and myalgias during their trip. He reports a cough productive of white sputum that began 6 days ago and is worse when lying down as well as 8 episodes of NBNB vomiting in the past week, most recently yesterday morning. He reports abdominal pain with coughing and denies constipation, diarrhea, or change in stool color. [place] the [place] saw his PMD and tested positive for influenza and was recommended to continue supportive care at home. Patient is a 73M w/ PMH AAA, IAA, nephrolithiasis, HTN, and HLD presenting with 1 day of dysuria and 6 days of cough. Patient reports a burning pain with urination that began yesterday morning and does not radiate. He denies flank or groin pain. He denies blood in urine, describing it as 'orange.' He reports returning from [place] 4 days ago and that both he and his wife developed coughing, vomiting, headache, and myalgias during their trip. He reports a cough productive of white sputum that began 6 days ago and is worse when lying down as well as 8 episodes of NBNB vomiting in the past week, most recently yesterday morning. He reports abdominal pain with coughing and denies constipation, diarrhea, or change in stool color. [place] he saw [place] PMD and tested positive for influenza and was recommended to continue supportive care at home.     In the ED: vitals 100.3  78  130/80  18  95% on room air. Given ceftriaxone IV and 1L NS

## 2018-06-21 LAB
ALBUMIN SERPL ELPH-MCNC: 3.2 G/DL — LOW (ref 3.3–5)
ALP SERPL-CCNC: 74 U/L — SIGNIFICANT CHANGE UP (ref 40–120)
ALT FLD-CCNC: 17 U/L — SIGNIFICANT CHANGE UP (ref 10–45)
ANION GAP SERPL CALC-SCNC: 12 MMOL/L — SIGNIFICANT CHANGE UP (ref 5–17)
AST SERPL-CCNC: 27 U/L — SIGNIFICANT CHANGE UP (ref 10–40)
BASOPHILS # BLD AUTO: 0.01 K/UL — SIGNIFICANT CHANGE UP (ref 0–0.2)
BASOPHILS NFR BLD AUTO: 0.1 % — SIGNIFICANT CHANGE UP (ref 0–2)
BILIRUB SERPL-MCNC: 1 MG/DL — SIGNIFICANT CHANGE UP (ref 0.2–1.2)
BUN SERPL-MCNC: 17 MG/DL — SIGNIFICANT CHANGE UP (ref 7–23)
CALCIUM SERPL-MCNC: 8 MG/DL — LOW (ref 8.4–10.5)
CHLORIDE SERPL-SCNC: 102 MMOL/L — SIGNIFICANT CHANGE UP (ref 96–108)
CO2 SERPL-SCNC: 22 MMOL/L — SIGNIFICANT CHANGE UP (ref 22–31)
CREAT SERPL-MCNC: 1.06 MG/DL — SIGNIFICANT CHANGE UP (ref 0.5–1.3)
EOSINOPHIL # BLD AUTO: 0 K/UL — SIGNIFICANT CHANGE UP (ref 0–0.5)
EOSINOPHIL NFR BLD AUTO: 0 % — SIGNIFICANT CHANGE UP (ref 0–6)
GLUCOSE SERPL-MCNC: 102 MG/DL — HIGH (ref 70–99)
HCT VFR BLD CALC: 35.4 % — LOW (ref 39–50)
HGB BLD-MCNC: 11.5 G/DL — LOW (ref 13–17)
IMM GRANULOCYTES NFR BLD AUTO: 0.3 % — SIGNIFICANT CHANGE UP (ref 0–1.5)
LYMPHOCYTES # BLD AUTO: 1.05 K/UL — SIGNIFICANT CHANGE UP (ref 1–3.3)
LYMPHOCYTES # BLD AUTO: 8.2 % — LOW (ref 13–44)
MCHC RBC-ENTMCNC: 30.1 PG — SIGNIFICANT CHANGE UP (ref 27–34)
MCHC RBC-ENTMCNC: 32.5 GM/DL — SIGNIFICANT CHANGE UP (ref 32–36)
MCV RBC AUTO: 92.7 FL — SIGNIFICANT CHANGE UP (ref 80–100)
MONOCYTES # BLD AUTO: 1.02 K/UL — HIGH (ref 0–0.9)
MONOCYTES NFR BLD AUTO: 7.9 % — SIGNIFICANT CHANGE UP (ref 2–14)
NEUTROPHILS # BLD AUTO: 10.75 K/UL — HIGH (ref 1.8–7.4)
NEUTROPHILS NFR BLD AUTO: 83.5 % — HIGH (ref 43–77)
PLATELET # BLD AUTO: 138 K/UL — LOW (ref 150–400)
POTASSIUM SERPL-MCNC: 3.6 MMOL/L — SIGNIFICANT CHANGE UP (ref 3.5–5.3)
POTASSIUM SERPL-SCNC: 3.6 MMOL/L — SIGNIFICANT CHANGE UP (ref 3.5–5.3)
PROT SERPL-MCNC: 6.5 G/DL — SIGNIFICANT CHANGE UP (ref 6–8.3)
RBC # BLD: 3.82 M/UL — LOW (ref 4.2–5.8)
RBC # FLD: 14.4 % — SIGNIFICANT CHANGE UP (ref 10.3–14.5)
SODIUM SERPL-SCNC: 136 MMOL/L — SIGNIFICANT CHANGE UP (ref 135–145)
WBC # BLD: 12.87 K/UL — HIGH (ref 3.8–10.5)
WBC # FLD AUTO: 12.87 K/UL — HIGH (ref 3.8–10.5)

## 2018-06-21 PROCEDURE — 99233 SBSQ HOSP IP/OBS HIGH 50: CPT | Mod: GC

## 2018-06-21 RX ORDER — ONDANSETRON 8 MG/1
4 TABLET, FILM COATED ORAL ONCE
Qty: 0 | Refills: 0 | Status: COMPLETED | OUTPATIENT
Start: 2018-06-21 | End: 2018-06-21

## 2018-06-21 RX ORDER — ACETAMINOPHEN 500 MG
650 TABLET ORAL ONCE
Qty: 0 | Refills: 0 | Status: COMPLETED | OUTPATIENT
Start: 2018-06-21 | End: 2018-06-21

## 2018-06-21 RX ORDER — ONDANSETRON 8 MG/1
4 TABLET, FILM COATED ORAL EVERY 6 HOURS
Qty: 0 | Refills: 0 | Status: DISCONTINUED | OUTPATIENT
Start: 2018-06-21 | End: 2018-06-21

## 2018-06-21 RX ORDER — ONDANSETRON 8 MG/1
4 TABLET, FILM COATED ORAL EVERY 6 HOURS
Qty: 0 | Refills: 0 | Status: DISCONTINUED | OUTPATIENT
Start: 2018-06-21 | End: 2018-06-23

## 2018-06-21 RX ORDER — SODIUM CHLORIDE 9 MG/ML
1000 INJECTION, SOLUTION INTRAVENOUS
Qty: 0 | Refills: 0 | Status: DISCONTINUED | OUTPATIENT
Start: 2018-06-21 | End: 2018-06-22

## 2018-06-21 RX ADMIN — LOSARTAN POTASSIUM 50 MILLIGRAM(S): 100 TABLET, FILM COATED ORAL at 05:04

## 2018-06-21 RX ADMIN — ENOXAPARIN SODIUM 40 MILLIGRAM(S): 100 INJECTION SUBCUTANEOUS at 11:00

## 2018-06-21 RX ADMIN — Medication 650 MILLIGRAM(S): at 05:02

## 2018-06-21 RX ADMIN — Medication 75 MILLIGRAM(S): at 17:02

## 2018-06-21 RX ADMIN — Medication 650 MILLIGRAM(S): at 04:32

## 2018-06-21 RX ADMIN — Medication 75 MILLIGRAM(S): at 05:05

## 2018-06-21 RX ADMIN — SODIUM CHLORIDE 75 MILLILITER(S): 9 INJECTION, SOLUTION INTRAVENOUS at 14:38

## 2018-06-21 RX ADMIN — CEFTRIAXONE 100 GRAM(S): 500 INJECTION, POWDER, FOR SOLUTION INTRAMUSCULAR; INTRAVENOUS at 14:38

## 2018-06-21 RX ADMIN — ONDANSETRON 4 MILLIGRAM(S): 8 TABLET, FILM COATED ORAL at 14:38

## 2018-06-21 RX ADMIN — ONDANSETRON 4 MILLIGRAM(S): 8 TABLET, FILM COATED ORAL at 09:10

## 2018-06-21 RX ADMIN — ATORVASTATIN CALCIUM 20 MILLIGRAM(S): 80 TABLET, FILM COATED ORAL at 20:18

## 2018-06-21 RX ADMIN — ONDANSETRON 4 MILLIGRAM(S): 8 TABLET, FILM COATED ORAL at 22:03

## 2018-06-21 RX ADMIN — Medication 650 MILLIGRAM(S): at 20:18

## 2018-06-21 NOTE — PHYSICAL THERAPY INITIAL EVALUATION ADULT - ADDITIONAL COMMENTS
Pt lives with spouse in a private house with 9 steps to enter and 15 steps inside. Pt was Ind with all ADLs and amb without AD.

## 2018-06-21 NOTE — PROGRESS NOTE ADULT - SUBJECTIVE AND OBJECTIVE BOX
Patient is a 73y old  Male who presents with a chief complaint of Complicated urinary tract infection (2018 16:40)      SUBJECTIVE / OVERNIGHT EVENTS:    MEDICATIONS  (STANDING):  atorvastatin 20 milliGRAM(s) Oral at bedtime  cefTRIAXone   IVPB 1 Gram(s) IV Intermittent every 24 hours  enoxaparin Injectable 40 milliGRAM(s) SubCutaneous daily  HYDROcodone/homatropine Syrup 5 milliLiter(s) Oral every 6 hours  losartan 50 milliGRAM(s) Oral daily  oseltamivir 75 milliGRAM(s) Oral two times a day    MEDICATIONS  (PRN):  acetaminophen   Tablet 650 milliGRAM(s) Oral every 6 hours PRN For Temp greater than 38 C (100.4 F)        CAPILLARY BLOOD GLUCOSE        I&O's Summary      PHYSICAL EXAM:  GENERAL: NAD, well-groomed, well-developed  HEAD:  Atraumatic, Normocephalic  EYES: EOMI, PERRLA, conjunctiva and sclera clear  ENMT: No tonsillar erythema, exudates, or enlargement; Moist mucous membranes,   NECK: Supple, No JVD, Normal thyroid  HEART: Regular rate and rhythm; No murmurs, rubs, or gallops  RESPIRATORY: CTA B/L, No W/R/R  ABDOMEN: Suprapubic tendernes. Abdomen mildly distended and soft. +BS  NEUROLOGY: A&Ox3, nonfocal, CN II-XII grossly intact, sensation intact, no gait abnormalities bilaterally;  EXTREMITIES:  2+ Peripheral Pulses, No clubbing, cyanosis, or edema  SKIN: warm, dry, normal color, no rash or abnormal lesions    LABS:                        13.7   13.5  )-----------( 146      ( 2018 12:54 )             39.5     06-21    136  |  102  |  17  ----------------------------<  102<H>  3.6   |  22  |  1.06    Ca    8.0<L>      2018 06:08    TPro  6.5  /  Alb  3.2<L>  /  TBili  1.0  /  DBili  x   /  AST  27  /  ALT  17  /  AlkPhos  74  06-21          Urinalysis Basic - ( 2018 13:42 )    Color: Yellow / Appearance: SL Turbid / S.023 / pH: x  Gluc: x / Ketone: Trace  / Bili: Negative / Urobili: Negative   Blood: x / Protein: 30 mg/dL / Nitrite: Positive   Leuk Esterase: Large / RBC: 5-10 /HPF / WBC >50 /HPF   Sq Epi: x / Non Sq Epi: Occasional /HPF / Bacteria: Many /HPF        RADIOLOGY & ADDITIONAL TESTS:    Imaging Personally Reviewed:    Consultant(s) Notes Reviewed:      Care Discussed with Consultants/Other Providers: Patient is a 73y old  Male who presents with a chief complaint of Complicated urinary tract infection (2018 16:40)      SUBJECTIVE / OVERNIGHT EVENTS:  No acute event overnight. Pt reports cough improving. No CP, palpitation, sob. endorses dysuria unchanged.     MEDICATIONS  (STANDING):  atorvastatin 20 milliGRAM(s) Oral at bedtime  cefTRIAXone   IVPB 1 Gram(s) IV Intermittent every 24 hours  enoxaparin Injectable 40 milliGRAM(s) SubCutaneous daily  HYDROcodone/homatropine Syrup 5 milliLiter(s) Oral every 6 hours  losartan 50 milliGRAM(s) Oral daily  oseltamivir 75 milliGRAM(s) Oral two times a day    MEDICATIONS  (PRN):  acetaminophen   Tablet 650 milliGRAM(s) Oral every 6 hours PRN For Temp greater than 38 C (100.4 F)        CAPILLARY BLOOD GLUCOSE        I&O's Summary      PHYSICAL EXAM:  GENERAL: NAD, well-groomed, well-developed  HEAD:  Atraumatic, Normocephalic  EYES: EOMI, PERRLA, conjunctiva and sclera clear  ENMT: No tonsillar erythema, exudates, or enlargement; Moist mucous membranes,   NECK: Supple, No JVD, Normal thyroid  HEART: Regular rate and rhythm; No murmurs, rubs, or gallops  RESPIRATORY: CTA B/L, No W/R/R  ABDOMEN: Suprapubic tendernes. soft, non distended, BS+  NEUROLOGY: A&Ox3, nonfocal, CN II-XII grossly intact, sensation intact, no gait abnormalities bilaterally;  EXTREMITIES:  2+ Peripheral Pulses, No clubbing, cyanosis, or edema  SKIN: warm, dry, normal color, no rash or abnormal lesions    LABS:                        13.7   13.5  )-----------( 146      ( 2018 12:54 )             39.5     06-21    136  |  102  |  17  ----------------------------<  102<H>  3.6   |  22  |  1.06    Ca    8.0<L>      2018 06:08    TPro  6.5  /  Alb  3.2<L>  /  TBili  1.0  /  DBili  x   /  AST  27  /  ALT  17  /  AlkPhos  74  06-          Urinalysis Basic - ( 2018 13:42 )    Color: Yellow / Appearance: SL Turbid / S.023 / pH: x  Gluc: x / Ketone: Trace  / Bili: Negative / Urobili: Negative   Blood: x / Protein: 30 mg/dL / Nitrite: Positive   Leuk Esterase: Large / RBC: 5-10 /HPF / WBC >50 /HPF   Sq Epi: x / Non Sq Epi: Occasional /HPF / Bacteria: Many /HPF        RADIOLOGY & ADDITIONAL TESTS:    Imaging Personally Reviewed:    Consultant(s) Notes Reviewed:      Care Discussed with Consultants/Other Providers:

## 2018-06-21 NOTE — PHYSICAL THERAPY INITIAL EVALUATION ADULT - PRECAUTIONS/LIMITATIONS, REHAB EVAL
He reports a cough productive of white sputum that began 6 days ago and is worse when lying down as well as 8 episodes of NBNB vomiting in the past week, most recently yesterday morning.  He reports abdominal pain with coughing and denies constipation, diarrhea, or change in stool color. [place] saw [place] PM[place] and tested positive for influenza and was recommended to continue supportive care at home.

## 2018-06-21 NOTE — PROGRESS NOTE ADULT - PROBLEM SELECTOR PLAN 1
-dysuria with UA positive for nitrites, leuk esterase, and bacteria  -cont. ceftriaxone IV  -f/u urine cultures   -f/u blood cultures  -plan for switch to abx PO tomorrow  -cont. IVF  -trend CBC -dysuria with UA positive for nitrites, leuk esterase, and bacteria  -cont. ceftriaxone IV  -f/u urine cultures   -f/u blood cultures  -tailor abx based on Ucx sensitivity  -cont. IVF  -trend CBC

## 2018-06-21 NOTE — PHYSICAL THERAPY INITIAL EVALUATION ADULT - PERTINENT HX OF CURRENT PROBLEM, REHAB EVAL
Pt is a 72 y/o male admitted to [facility] on 6/20/18  PMH AAA, IAA, nephrolithiasis, HTN, and HLD presenting with 1 day of dysuria and 6 days of cough. Patient reports a burning pain with urination that began yesterday morning and does not radiate. He denies flank or groin pain. He denies blood in urine, describing it as 'orange.' He reports returning from [place] 4 days ago and that both he and his wife developed coughing, vomiting, headache, and myalgias during their trip.

## 2018-06-21 NOTE — PROGRESS NOTE ADULT - PROBLEM SELECTOR PLAN 2
-h/o cough with RVP positive for influenza A  -give tamiflu dose  -acetaminophen PRN for pain  -cough suppressants c/w tamilflu  -acetaminophen PRN for fever  -symptomatic support with cough suppressants  -saturating well on RA with no respiratory distress

## 2018-06-22 LAB
-  AMIKACIN: SIGNIFICANT CHANGE UP
-  AMOXICILLIN/CLAVULANIC ACID: SIGNIFICANT CHANGE UP
-  AMPICILLIN/SULBACTAM: SIGNIFICANT CHANGE UP
-  AMPICILLIN: SIGNIFICANT CHANGE UP
-  AZTREONAM: SIGNIFICANT CHANGE UP
-  CEFAZOLIN: SIGNIFICANT CHANGE UP
-  CEFEPIME: SIGNIFICANT CHANGE UP
-  CEFOXITIN: SIGNIFICANT CHANGE UP
-  CEFTRIAXONE: SIGNIFICANT CHANGE UP
-  CIPROFLOXACIN: SIGNIFICANT CHANGE UP
-  ERTAPENEM: SIGNIFICANT CHANGE UP
-  GENTAMICIN: SIGNIFICANT CHANGE UP
-  IMIPENEM: SIGNIFICANT CHANGE UP
-  LEVOFLOXACIN: SIGNIFICANT CHANGE UP
-  MEROPENEM: SIGNIFICANT CHANGE UP
-  NITROFURANTOIN: SIGNIFICANT CHANGE UP
-  PIPERACILLIN/TAZOBACTAM: SIGNIFICANT CHANGE UP
-  TIGECYCLINE: SIGNIFICANT CHANGE UP
-  TOBRAMYCIN: SIGNIFICANT CHANGE UP
-  TRIMETHOPRIM/SULFAMETHOXAZOLE: SIGNIFICANT CHANGE UP
ANION GAP SERPL CALC-SCNC: 12 MMOL/L — SIGNIFICANT CHANGE UP (ref 5–17)
BASOPHILS # BLD AUTO: 0.01 K/UL — SIGNIFICANT CHANGE UP (ref 0–0.2)
BASOPHILS NFR BLD AUTO: 0.1 % — SIGNIFICANT CHANGE UP (ref 0–2)
BUN SERPL-MCNC: 14 MG/DL — SIGNIFICANT CHANGE UP (ref 7–23)
CALCIUM SERPL-MCNC: 8.3 MG/DL — LOW (ref 8.4–10.5)
CHLORIDE SERPL-SCNC: 102 MMOL/L — SIGNIFICANT CHANGE UP (ref 96–108)
CO2 SERPL-SCNC: 23 MMOL/L — SIGNIFICANT CHANGE UP (ref 22–31)
CREAT SERPL-MCNC: 1.07 MG/DL — SIGNIFICANT CHANGE UP (ref 0.5–1.3)
CULTURE RESULTS: SIGNIFICANT CHANGE UP
EOSINOPHIL # BLD AUTO: 0.06 K/UL — SIGNIFICANT CHANGE UP (ref 0–0.5)
EOSINOPHIL NFR BLD AUTO: 0.6 % — SIGNIFICANT CHANGE UP (ref 0–6)
GLUCOSE SERPL-MCNC: 120 MG/DL — HIGH (ref 70–99)
HCT VFR BLD CALC: 34.2 % — LOW (ref 39–50)
HGB BLD-MCNC: 11.5 G/DL — LOW (ref 13–17)
IMM GRANULOCYTES NFR BLD AUTO: 0.3 % — SIGNIFICANT CHANGE UP (ref 0–1.5)
LYMPHOCYTES # BLD AUTO: 0.87 K/UL — LOW (ref 1–3.3)
LYMPHOCYTES # BLD AUTO: 9 % — LOW (ref 13–44)
MCHC RBC-ENTMCNC: 30.9 PG — SIGNIFICANT CHANGE UP (ref 27–34)
MCHC RBC-ENTMCNC: 33.6 GM/DL — SIGNIFICANT CHANGE UP (ref 32–36)
MCV RBC AUTO: 91.9 FL — SIGNIFICANT CHANGE UP (ref 80–100)
METHOD TYPE: SIGNIFICANT CHANGE UP
MONOCYTES # BLD AUTO: 0.78 K/UL — SIGNIFICANT CHANGE UP (ref 0–0.9)
MONOCYTES NFR BLD AUTO: 8.1 % — SIGNIFICANT CHANGE UP (ref 2–14)
NEUTROPHILS # BLD AUTO: 7.87 K/UL — HIGH (ref 1.8–7.4)
NEUTROPHILS NFR BLD AUTO: 81.9 % — HIGH (ref 43–77)
ORGANISM # SPEC MICROSCOPIC CNT: SIGNIFICANT CHANGE UP
ORGANISM # SPEC MICROSCOPIC CNT: SIGNIFICANT CHANGE UP
PLATELET # BLD AUTO: 152 K/UL — SIGNIFICANT CHANGE UP (ref 150–400)
POTASSIUM SERPL-MCNC: 4.1 MMOL/L — SIGNIFICANT CHANGE UP (ref 3.5–5.3)
POTASSIUM SERPL-SCNC: 4.1 MMOL/L — SIGNIFICANT CHANGE UP (ref 3.5–5.3)
RBC # BLD: 3.72 M/UL — LOW (ref 4.2–5.8)
RBC # FLD: 14.3 % — SIGNIFICANT CHANGE UP (ref 10.3–14.5)
SODIUM SERPL-SCNC: 137 MMOL/L — SIGNIFICANT CHANGE UP (ref 135–145)
SPECIMEN SOURCE: SIGNIFICANT CHANGE UP
WBC # BLD: 9.62 K/UL — SIGNIFICANT CHANGE UP (ref 3.8–10.5)
WBC # FLD AUTO: 9.62 K/UL — SIGNIFICANT CHANGE UP (ref 3.8–10.5)

## 2018-06-22 PROCEDURE — 74176 CT ABD & PELVIS W/O CONTRAST: CPT | Mod: 26

## 2018-06-22 PROCEDURE — 99233 SBSQ HOSP IP/OBS HIGH 50: CPT | Mod: GC

## 2018-06-22 RX ORDER — ACETAMINOPHEN 500 MG
650 TABLET ORAL ONCE
Qty: 0 | Refills: 0 | Status: COMPLETED | OUTPATIENT
Start: 2018-06-22 | End: 2018-06-22

## 2018-06-22 RX ORDER — SODIUM CHLORIDE 9 MG/ML
1000 INJECTION INTRAMUSCULAR; INTRAVENOUS; SUBCUTANEOUS
Qty: 0 | Refills: 0 | Status: DISCONTINUED | OUTPATIENT
Start: 2018-06-22 | End: 2018-06-23

## 2018-06-22 RX ORDER — TAMSULOSIN HYDROCHLORIDE 0.4 MG/1
0.4 CAPSULE ORAL AT BEDTIME
Qty: 0 | Refills: 0 | Status: DISCONTINUED | OUTPATIENT
Start: 2018-06-22 | End: 2018-06-23

## 2018-06-22 RX ADMIN — ONDANSETRON 4 MILLIGRAM(S): 8 TABLET, FILM COATED ORAL at 08:10

## 2018-06-22 RX ADMIN — Medication 75 MILLIGRAM(S): at 17:34

## 2018-06-22 RX ADMIN — Medication 650 MILLIGRAM(S): at 22:05

## 2018-06-22 RX ADMIN — SODIUM CHLORIDE 75 MILLILITER(S): 9 INJECTION, SOLUTION INTRAVENOUS at 06:11

## 2018-06-22 RX ADMIN — ENOXAPARIN SODIUM 40 MILLIGRAM(S): 100 INJECTION SUBCUTANEOUS at 11:57

## 2018-06-22 RX ADMIN — Medication 75 MILLIGRAM(S): at 06:12

## 2018-06-22 RX ADMIN — Medication 650 MILLIGRAM(S): at 22:35

## 2018-06-22 RX ADMIN — SODIUM CHLORIDE 75 MILLILITER(S): 9 INJECTION INTRAMUSCULAR; INTRAVENOUS; SUBCUTANEOUS at 18:36

## 2018-06-22 RX ADMIN — LOSARTAN POTASSIUM 50 MILLIGRAM(S): 100 TABLET, FILM COATED ORAL at 06:16

## 2018-06-22 RX ADMIN — CEFTRIAXONE 100 GRAM(S): 500 INJECTION, POWDER, FOR SOLUTION INTRAMUSCULAR; INTRAVENOUS at 14:26

## 2018-06-22 RX ADMIN — TAMSULOSIN HYDROCHLORIDE 0.4 MILLIGRAM(S): 0.4 CAPSULE ORAL at 21:09

## 2018-06-22 RX ADMIN — ATORVASTATIN CALCIUM 20 MILLIGRAM(S): 80 TABLET, FILM COATED ORAL at 21:09

## 2018-06-22 NOTE — PROGRESS NOTE ADULT - PROBLEM SELECTOR PLAN 2
c/w tamilflu  -acetaminophen PRN for fever  -symptomatic support with cough suppressants  -saturating well on RA with no respiratory distress

## 2018-06-22 NOTE — PROGRESS NOTE ADULT - ASSESSMENT
Patient is a 73M w/ PMH AAA, IAA, nephrolithiasis, HTN, and HLD presenting with 1 day of dysuria most likely due to UTI and 6 days of cough mostly likely due to influenza URI, alternate diagnoses include pneumonia which is less likely with clear CXR. Patient is a 73M w/ PMH AAA, IAA, nephrolithiasis, HTN, and HLD a/w influenza URI and complicated UTI.

## 2018-06-22 NOTE — PROGRESS NOTE ADULT - PROBLEM SELECTOR PLAN 1
-dysuria with UA positive for nitrites, leuk esterase, and bacteria  -cont. ceftriaxone IV  -f/u urine cultures   -f/u blood cultures  -tailor abx based on Ucx sensitivity  -cont. IVF  -trend CBC -worsening dysuria with associated sharp pain and straining with urination concerning for nephrolithiasis in setting of UA with 5-10 RBC and persistent intermittent fever  -cont. ceftriaxone IV  -urine cultures noted Ecoli, pending sensitivity  -blood cultures NGTD  -tailor abx based on Ucx sensitivity  -CT a/p for stone stone  -flomax for urinary symptom

## 2018-06-22 NOTE — PROGRESS NOTE ADULT - SUBJECTIVE AND OBJECTIVE BOX
Patient is a 73y old  Male who presents with a chief complaint of Complicated urinary tract infection (2018 16:40)      SUBJECTIVE / OVERNIGHT EVENTS:  Pt febrile overnight to 101.3. Pt endorses cough improving but dysuria worsening. No hematuria, CP, palpitation, sob.     MEDICATIONS  (STANDING):  atorvastatin 20 milliGRAM(s) Oral at bedtime  cefTRIAXone   IVPB 1 Gram(s) IV Intermittent every 24 hours  dextrose 5% + sodium chloride 0.45%. 1000 milliLiter(s) (75 mL/Hr) IV Continuous <Continuous>  enoxaparin Injectable 40 milliGRAM(s) SubCutaneous daily  HYDROcodone/homatropine Syrup 5 milliLiter(s) Oral every 6 hours  losartan 50 milliGRAM(s) Oral daily  oseltamivir 75 milliGRAM(s) Oral two times a day    MEDICATIONS  (PRN):  acetaminophen   Tablet 650 milliGRAM(s) Oral every 6 hours PRN For Temp greater than 38 C (100.4 F)  ondansetron Injectable 4 milliGRAM(s) IV Push every 6 hours PRN Nausea and/or Vomiting        CAPILLARY BLOOD GLUCOSE        I&O's Summary    2018 07:01  -  2018 07:00  --------------------------------------------------------  IN: 2420 mL / OUT: 1400 mL / NET: 1020 mL        PHYSICAL EXAM:  GENERAL: NAD, well-groomed, well-developed  HEAD:  Atraumatic, Normocephalic  EYES: EOMI, PERRLA, conjunctiva and sclera clear  ENMT: No tonsillar erythema, exudates, or enlargement; Moist mucous membranes,   NECK: Supple, No JVD, Normal thyroid  HEART: Regular rate and rhythm; No murmurs, rubs, or gallops  RESPIRATORY: CTA B/L, No W/R/R  ABDOMEN: Suprapubic tenderness. Abdomen mildly distended and soft. +BS  NEUROLOGY: A&Ox3, nonfocal, CN II-XII grossly intact, sensation intact, no gait abnormalities bilaterally;  EXTREMITIES:  2+ Peripheral Pulses, No clubbing, cyanosis, or edema  SKIN: warm, dry, normal color, no rash or abnormal lesions    LABS:                        11.5   12.87 )-----------( 138      ( 2018 08:11 )             35.4     06-    137  |  102  |  14  ----------------------------<  120<H>  4.1   |  23  |  1.07    Ca    8.3<L>      2018 06:17    TPro  6.5  /  Alb  3.2<L>  /  TBili  1.0  /  DBili  x   /  AST  27  /  ALT  17  /  AlkPhos  74  06-21          Urinalysis Basic - ( 2018 13:42 )    Color: Yellow / Appearance: SL Turbid / S.023 / pH: x  Gluc: x / Ketone: Trace  / Bili: Negative / Urobili: Negative   Blood: x / Protein: 30 mg/dL / Nitrite: Positive   Leuk Esterase: Large / RBC: 5-10 /HPF / WBC >50 /HPF   Sq Epi: x / Non Sq Epi: Occasional /HPF / Bacteria: Many /HPF        RADIOLOGY & ADDITIONAL TESTS:    Imaging Personally Reviewed:    Consultant(s) Notes Reviewed:      Care Discussed with Consultants/Other Providers: Patient is a 73y old  Male who presents with a chief complaint of Complicated urinary tract infection (2018 16:40)      SUBJECTIVE / OVERNIGHT EVENTS:  Pt febrile overnight to 101.3. Pt endorses cough improving but dysuria worsening with straining of urination and sharp pain. No hematuria, CP, palpitation, sob.     MEDICATIONS  (STANDING):  atorvastatin 20 milliGRAM(s) Oral at bedtime  cefTRIAXone   IVPB 1 Gram(s) IV Intermittent every 24 hours  dextrose 5% + sodium chloride 0.45%. 1000 milliLiter(s) (75 mL/Hr) IV Continuous <Continuous>  enoxaparin Injectable 40 milliGRAM(s) SubCutaneous daily  HYDROcodone/homatropine Syrup 5 milliLiter(s) Oral every 6 hours  losartan 50 milliGRAM(s) Oral daily  oseltamivir 75 milliGRAM(s) Oral two times a day    MEDICATIONS  (PRN):  acetaminophen   Tablet 650 milliGRAM(s) Oral every 6 hours PRN For Temp greater than 38 C (100.4 F)  ondansetron Injectable 4 milliGRAM(s) IV Push every 6 hours PRN Nausea and/or Vomiting        CAPILLARY BLOOD GLUCOSE        I&O's Summary    2018 07:01  -  2018 07:00  --------------------------------------------------------  IN: 2420 mL / OUT: 1400 mL / NET: 1020 mL        PHYSICAL EXAM:  GENERAL: NAD, well-groomed, well-developed  HEAD:  Atraumatic, Normocephalic  EYES: EOMI, PERRLA, conjunctiva and sclera clear  ENMT: No tonsillar erythema, exudates, or enlargement; Moist mucous membranes,   NECK: Supple, No JVD, Normal thyroid  HEART: Regular rate and rhythm; No murmurs, rubs, or gallops  RESPIRATORY: CTA B/L, No W/R/R  ABDOMEN: Suprapubic tenderness. Abdomen mildly distended and soft. +BS  NEUROLOGY: A&Ox3, nonfocal, CN II-XII grossly intact, sensation intact, no gait abnormalities bilaterally;  EXTREMITIES:  2+ Peripheral Pulses, No clubbing, cyanosis, or edema  SKIN: warm, dry, normal color, no rash or abnormal lesions    LABS:                        11.5   12.87 )-----------( 138      ( 2018 08:11 )             35.4     06-    137  |  102  |  14  ----------------------------<  120<H>  4.1   |  23  |  1.07    Ca    8.3<L>      2018 06:17    TPro  6.5  /  Alb  3.2<L>  /  TBili  1.0  /  DBili  x   /  AST  27  /  ALT  17  /  AlkPhos  74  06-21          Urinalysis Basic - ( 2018 13:42 )    Color: Yellow / Appearance: SL Turbid / S.023 / pH: x  Gluc: x / Ketone: Trace  / Bili: Negative / Urobili: Negative   Blood: x / Protein: 30 mg/dL / Nitrite: Positive   Leuk Esterase: Large / RBC: 5-10 /HPF / WBC >50 /HPF   Sq Epi: x / Non Sq Epi: Occasional /HPF / Bacteria: Many /HPF        RADIOLOGY & ADDITIONAL TESTS:    Imaging Personally Reviewed:    Consultant(s) Notes Reviewed:      Care Discussed with Consultants/Other Providers:

## 2018-06-23 ENCOUNTER — MOBILE ON CALL (OUTPATIENT)
Age: 73
End: 2018-06-23

## 2018-06-23 ENCOUNTER — TRANSCRIPTION ENCOUNTER (OUTPATIENT)
Age: 73
End: 2018-06-23

## 2018-06-23 VITALS
TEMPERATURE: 99 F | OXYGEN SATURATION: 98 % | SYSTOLIC BLOOD PRESSURE: 139 MMHG | HEART RATE: 75 BPM | RESPIRATION RATE: 18 BRPM | DIASTOLIC BLOOD PRESSURE: 77 MMHG

## 2018-06-23 LAB
ANION GAP SERPL CALC-SCNC: 10 MMOL/L — SIGNIFICANT CHANGE UP (ref 5–17)
BASOPHILS # BLD AUTO: 0.03 K/UL — SIGNIFICANT CHANGE UP (ref 0–0.2)
BASOPHILS NFR BLD AUTO: 0.6 % — SIGNIFICANT CHANGE UP (ref 0–2)
BUN SERPL-MCNC: 11 MG/DL — SIGNIFICANT CHANGE UP (ref 7–23)
CALCIUM SERPL-MCNC: 8.3 MG/DL — LOW (ref 8.4–10.5)
CHLORIDE SERPL-SCNC: 105 MMOL/L — SIGNIFICANT CHANGE UP (ref 96–108)
CO2 SERPL-SCNC: 26 MMOL/L — SIGNIFICANT CHANGE UP (ref 22–31)
CREAT SERPL-MCNC: 0.98 MG/DL — SIGNIFICANT CHANGE UP (ref 0.5–1.3)
EOSINOPHIL # BLD AUTO: 0.13 K/UL — SIGNIFICANT CHANGE UP (ref 0–0.5)
EOSINOPHIL NFR BLD AUTO: 2.6 % — SIGNIFICANT CHANGE UP (ref 0–6)
GLUCOSE SERPL-MCNC: 98 MG/DL — SIGNIFICANT CHANGE UP (ref 70–99)
HCT VFR BLD CALC: 34.9 % — LOW (ref 39–50)
HGB BLD-MCNC: 11.5 G/DL — LOW (ref 13–17)
IMM GRANULOCYTES NFR BLD AUTO: 1.2 % — SIGNIFICANT CHANGE UP (ref 0–1.5)
LYMPHOCYTES # BLD AUTO: 1.02 K/UL — SIGNIFICANT CHANGE UP (ref 1–3.3)
LYMPHOCYTES # BLD AUTO: 20.6 % — SIGNIFICANT CHANGE UP (ref 13–44)
MCHC RBC-ENTMCNC: 30.8 PG — SIGNIFICANT CHANGE UP (ref 27–34)
MCHC RBC-ENTMCNC: 33 GM/DL — SIGNIFICANT CHANGE UP (ref 32–36)
MCV RBC AUTO: 93.6 FL — SIGNIFICANT CHANGE UP (ref 80–100)
MONOCYTES # BLD AUTO: 0.71 K/UL — SIGNIFICANT CHANGE UP (ref 0–0.9)
MONOCYTES NFR BLD AUTO: 14.3 % — HIGH (ref 2–14)
NEUTROPHILS # BLD AUTO: 3.01 K/UL — SIGNIFICANT CHANGE UP (ref 1.8–7.4)
NEUTROPHILS NFR BLD AUTO: 60.7 % — SIGNIFICANT CHANGE UP (ref 43–77)
PLATELET # BLD AUTO: 179 K/UL — SIGNIFICANT CHANGE UP (ref 150–400)
POTASSIUM SERPL-MCNC: 4.1 MMOL/L — SIGNIFICANT CHANGE UP (ref 3.5–5.3)
POTASSIUM SERPL-SCNC: 4.1 MMOL/L — SIGNIFICANT CHANGE UP (ref 3.5–5.3)
RBC # BLD: 3.73 M/UL — LOW (ref 4.2–5.8)
RBC # FLD: 14.1 % — SIGNIFICANT CHANGE UP (ref 10.3–14.5)
SODIUM SERPL-SCNC: 141 MMOL/L — SIGNIFICANT CHANGE UP (ref 135–145)
WBC # BLD: 4.96 K/UL — SIGNIFICANT CHANGE UP (ref 3.8–10.5)
WBC # FLD AUTO: 4.96 K/UL — SIGNIFICANT CHANGE UP (ref 3.8–10.5)

## 2018-06-23 PROCEDURE — 87798 DETECT AGENT NOS DNA AMP: CPT

## 2018-06-23 PROCEDURE — 87581 M.PNEUMON DNA AMP PROBE: CPT

## 2018-06-23 PROCEDURE — 99285 EMERGENCY DEPT VISIT HI MDM: CPT | Mod: 25

## 2018-06-23 PROCEDURE — 85027 COMPLETE CBC AUTOMATED: CPT

## 2018-06-23 PROCEDURE — 80048 BASIC METABOLIC PNL TOTAL CA: CPT

## 2018-06-23 PROCEDURE — 93005 ELECTROCARDIOGRAM TRACING: CPT

## 2018-06-23 PROCEDURE — 83605 ASSAY OF LACTIC ACID: CPT

## 2018-06-23 PROCEDURE — 80053 COMPREHEN METABOLIC PANEL: CPT

## 2018-06-23 PROCEDURE — 96374 THER/PROPH/DIAG INJ IV PUSH: CPT

## 2018-06-23 PROCEDURE — 71045 X-RAY EXAM CHEST 1 VIEW: CPT

## 2018-06-23 PROCEDURE — 87633 RESP VIRUS 12-25 TARGETS: CPT

## 2018-06-23 PROCEDURE — 99223 1ST HOSP IP/OBS HIGH 75: CPT | Mod: GC

## 2018-06-23 PROCEDURE — 87486 CHLMYD PNEUM DNA AMP PROBE: CPT

## 2018-06-23 PROCEDURE — 87040 BLOOD CULTURE FOR BACTERIA: CPT

## 2018-06-23 PROCEDURE — 97161 PT EVAL LOW COMPLEX 20 MIN: CPT

## 2018-06-23 PROCEDURE — 74176 CT ABD & PELVIS W/O CONTRAST: CPT

## 2018-06-23 PROCEDURE — 81001 URINALYSIS AUTO W/SCOPE: CPT

## 2018-06-23 PROCEDURE — 87186 SC STD MICRODIL/AGAR DIL: CPT

## 2018-06-23 PROCEDURE — 87086 URINE CULTURE/COLONY COUNT: CPT

## 2018-06-23 RX ORDER — IBUPROFEN 200 MG
600 TABLET ORAL ONCE
Qty: 0 | Refills: 0 | Status: COMPLETED | OUTPATIENT
Start: 2018-06-23 | End: 2018-06-23

## 2018-06-23 RX ORDER — CEFUROXIME AXETIL 250 MG
250 TABLET ORAL EVERY 12 HOURS
Qty: 0 | Refills: 0 | Status: DISCONTINUED | OUTPATIENT
Start: 2018-06-23 | End: 2018-06-23

## 2018-06-23 RX ORDER — CEFUROXIME AXETIL 250 MG
1 TABLET ORAL
Qty: 6 | Refills: 0
Start: 2018-06-23 | End: 2018-06-25

## 2018-06-23 RX ADMIN — ENOXAPARIN SODIUM 40 MILLIGRAM(S): 100 INJECTION SUBCUTANEOUS at 12:39

## 2018-06-23 RX ADMIN — LOSARTAN POTASSIUM 50 MILLIGRAM(S): 100 TABLET, FILM COATED ORAL at 06:07

## 2018-06-23 RX ADMIN — Medication 600 MILLIGRAM(S): at 07:04

## 2018-06-23 RX ADMIN — Medication 250 MILLIGRAM(S): at 17:34

## 2018-06-23 RX ADMIN — Medication 75 MILLIGRAM(S): at 17:34

## 2018-06-23 RX ADMIN — Medication 75 MILLIGRAM(S): at 06:04

## 2018-06-23 RX ADMIN — Medication 600 MILLIGRAM(S): at 06:34

## 2018-06-23 NOTE — DISCHARGE NOTE ADULT - MEDICATION SUMMARY - MEDICATIONS TO TAKE
I will START or STAY ON the medications listed below when I get home from the hospital:    losartan 50 mg oral tablet  -- 1 tab(s) by mouth once a day  -- Indication: For High blood pressure    atorvastatin 20 mg oral tablet  -- 1 tab(s) by mouth once a day  -- Indication: For cholesterol    benzonatate 100 mg oral capsule  -- 1 cap(s) by mouth 3 times a day   -- May cause drowsiness.  Alcohol may intensify this effect.  Use care when operating dangerous machinery.  Swallow whole.  Do not crush.    -- Indication: For Cough    oseltamivir 75 mg oral capsule  -- 1 cap(s) by mouth 2 times a day  -- Indication: For Flu    cefuroxime 250 mg oral tablet  -- 1 tab(s) by mouth every 12 hours  -- Indication: For UTI

## 2018-06-23 NOTE — CONSULT NOTE ADULT - ASSESSMENT
73M w/ known abdominal aortic and L common iliac aneurysms    - no emergent surgical intervention  - at 5.1cm AAA, asymptomatic does not warrant intervention at this time, continue to follow  - at 3.1 cm, the iliac aneurysm should be repaired. CT noncontrast is not the optimal test, however as the patient is asymptomatic, he is safe for discharge today. He should make an appointment for follow-up with Dr. Enamorado in the near future, and likely repeat dedicated CTA  - d/w Dr. Mcneil on behalf of Dr. Fei Baires MD  [id number] 73M w/ known abdominal aortic and L common iliac aneurysms    - no emergent surgical intervention  - pt is currently asymptomatic  from AAA abd IAA  will need f/u in office w Dr Enamorado for cta and plannig of AAA open vs evar once current issues resolve

## 2018-06-23 NOTE — PROGRESS NOTE ADULT - ASSESSMENT
Patient is a 73M w/ PMH AAA, IAA, nephrolithiasis, HTN, and HLD a/w influenza URI and complicated UTI.

## 2018-06-23 NOTE — PROGRESS NOTE ADULT - ATTENDING COMMENTS
Patient seen and examined, presented with UTI and RVP positive, on Ceftriaxone and Tamiflu.   Vascular surgery consulted, noted to have AAA with iliac artery aneurysm,  recommend dedicated CTA and iliac artery aneurysm repair as out patient.    D/C planning.
fever 101.3 overnight, (+) suprapubic pain  flu symptoms are improving, UTI being treated with IV ceftriaxone.   concern for stone at this time    Deepti Duarte MD  Division of Hospital Medicine  Pager: [phone]  Office: [phone]
flu A and UTI, still with coughing spells and post-tussive emesis  on day 2 ceftriaxone. continue supportive care    Deepti Duarte MD  Division of Hospital Medicine  Pager: [phone]  Office: [phone]

## 2018-06-23 NOTE — PROGRESS NOTE ADULT - PROBLEM SELECTOR PLAN 4
-as above -cleared by vascular for discharge however should follow up immediately after discharge for further evaluation of iliac artery aneurysm repair. Instructions for follow up included in discharge summary.

## 2018-06-23 NOTE — PROGRESS NOTE ADULT - PROBLEM SELECTOR PLAN 2
-c/w tamilflu  -acetaminophen PRN for fever  -symptomatic support with cough suppressants  -saturating well on RA with no respiratory distress

## 2018-06-23 NOTE — DISCHARGE NOTE ADULT - PATIENT PORTAL LINK FT
You can access the [facility] Patient Portal, offered by [facility], by registering with the following website: [email or web address]

## 2018-06-23 NOTE — DISCHARGE NOTE ADULT - HOSPITAL COURSE
Patient is a 73M w/ PMH AAA, IAA, nephrolithiasis, HTN, and HLD a/w influenza URI and complicated UTI. He was started on tamiflu and ceftriaxone. Sensitivities for the UTI came back sensitive to cephalosporin. In regards to his abdominal aortic aneurysm, there shown to be an increase in size. vascular was consulted and they recommended outpatient follow up. Patient understood the importance of follow up and has a close relationship with Dr. Enamorado. They will set an appointment for follow up, as he is due for an appointment anyway. Patient is a 73M w/ PMH AAA, IAA, nephrolithiasis, HTN, and HLD a/w influenza URI and complicated UTI. He was started on tamiflu and ceftriaxone. Sensitivities for the UTI came back sensitive to cephalosporin. In regards to his abdominal aortic aneurysm, there shown to be an increase in size. vascular was consulted and they recommended outpatient follow up. Patient understood the importance of follow up and has a close relationship with Dr. Enamorado. They will set an appointment for follow up, as he is due for an appointment anyway. Pt seen by vascular surgery prior to discharge who cleared him for discharge with regards to the AAA and the iliac aneurysm however mandated that the pt follow up with Dr. Enamorado as soon as possible after discharge for further work up of the iliac aneurysm.

## 2018-06-23 NOTE — CONSULT NOTE ADULT - SUBJECTIVE AND OBJECTIVE BOX
VASCULAR SURGERY CONSULT NOTE    Patient is a 73y old  Male who presents with a chief complaint of Complicated urinary tract infection (23 Jun 2018 13:58)      HPI:  Patient is a 73M w/ PMH AAA, IAA, nephrolithiasis, HTN, and HLD presenting with 1 day of dysuria and 6 days of cough. Patient reports a burning pain with urination that began yesterday morning and does not radiate. He denies flank or groin pain. He denies blood in urine, describing it as 'orange.' He reports returning from [place] 4 days ago and that both he and his wife developed coughing, vomiting, headache, and myalgias during their trip. He reports a cough productive of white sputum that began 6 days ago and is worse when lying down as well as 8 episodes of NBNB vomiting in the past week, most recently yesterday morning. He reports abdominal pain with coughing and denies constipation, diarrhea, or change in stool color. [place] the [place] saw his PMD and tested positive for influenza and was recommended to continue supportive care at home.     In the ED: vitals 100.3  78  130/80  18  95% on room air. Given ceftriaxone IV and 1L NS (20 Jun 2018 16:40)    Vascular surgery consulted as the patient had a noncontrast CT scan that demonstrated increase in size of known infrarenal AAA to 5.1cm from 4.6cm in September on outpt ultrasound 2017 and 4.5 on CTA 1/2017. He is asymptomatic at the time of my exam, now denying abdominal pain, bloody BMs, food fear, nausea/vomiting. He denies pelvic or groin pain. Before his trip he felt well. His other symptoms as above have resolved and he is otherwise ready for discharge.      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    [  ] No significant past history as reviewed with the patient and family    FAMILY HISTORY:  Family history of stomach cancer (Father)    [  ] Family history not pertinent as reviewed with the patient and family    SOCIAL HISTORY:    MEDICATIONS  (STANDING):  atorvastatin 20 milliGRAM(s) Oral at bedtime  cefuroxime   Tablet 250 milliGRAM(s) Oral every 12 hours  enoxaparin Injectable 40 milliGRAM(s) SubCutaneous daily  HYDROcodone/homatropine Syrup 5 milliLiter(s) Oral every 6 hours  losartan 50 milliGRAM(s) Oral daily  oseltamivir 75 milliGRAM(s) Oral two times a day  tamsulosin 0.4 milliGRAM(s) Oral at bedtime    MEDICATIONS  (PRN):  acetaminophen   Tablet 650 milliGRAM(s) Oral every 6 hours PRN For Temp greater than 38 C (100.4 F)  ondansetron Injectable 4 milliGRAM(s) IV Push every 6 hours PRN Nausea and/or Vomiting    Allergies    No Known Allergies    Intolerances        Vital Signs Last 24 Hrs  T(C): 37.1 (23 Jun 2018 11:28), Max: 37.1 (23 Jun 2018 11:28)  T(F): 98.7 (23 Jun 2018 11:28), Max: 98.7 (23 Jun 2018 11:28)  HR: 74 (23 Jun 2018 11:28) (66 - 74)  BP: 132/84 (23 Jun 2018 11:28) (125/81 - 142/94)  BP(mean): --  RR: 18 (23 Jun 2018 11:28) (18 - 18)  SpO2: 98% (23 Jun 2018 11:28) (96% - 98%)  Daily     Daily     NAD, awake and alert  Respirations nonlabored  Abdomen soft, nontender, nondistended  No guarding or rebound tenderness   No pulsatile masses appreciated  Palpable femorals b/l  Palpable DP/PT bilaterally                        11.5   4.96  )-----------( 179      ( 23 Jun 2018 10:29 )             34.9     06-23    141  |  105  |  11  ----------------------------<  98  4.1   |  26  |  0.98    Ca    8.3<L>      23 Jun 2018 07:41            IMAGING STUDIES:  < from: CT Abdomen and Pelvis No Cont (06.22.18 @ 14:02) >  LOWER CHEST: Trace anterior bilateral lower lobe subsegmental   atelectasis. A 2 mm right lower lobe pulmonary nodule is unchanged since   12/15/2016.    LIVER: Within normal limits.  BILE DUCTS: Normal caliber.  GALLBLADDER: Within normal limits.  SPLEEN: Within normal limits.  PANCREAS: Within normal limits.  ADRENALS: Within normal limits.  KIDNEYS/URETERS: There is a 6 mm left-sided nonobstructing stone within   the renal pelvis. Bilateral parapelvic cysts. No hydronephrosis.    Evaluation of the pelvis is limited by streak artifact from bilateral hip   prosthesis.    BLADDER: Poorly visualized.   REPRODUCTIVE ORGANS: Evaluation of prostate is limited by streak   artifact, grossly within normal limits.    BOWEL: No bowel obstruction.   PERITONEUM: No ascites.  VESSELS:  There is an abdominal aortic aneurysm measuring up to 5.1 cm,   enlarged from 4.4 cm on 1/19/2017. Left common iliac artery aneurysm   measuring up to 3.1 cm, also slightly enlarged from 2.7 cm.   Atherosclerotic changes. Coronary artery calcifications.    RETROPERITONEUM: No lymphadenopathy.    ABDOMINAL WALL: Small bilateral fat-containing inguinal hernias..  BONES: Patient is status post bilateral hip replacement arthroplasty.   Degenerative changes of the spine.    IMPRESSION:   Multiple bilateral parapelvic cysts. No hydronephrosis. Nonobstructing   left nephrolithiasis.    Infrarenal 5.1 cm AAA, enlarged since prior. A 3.1 cm left common iliac   artery aneurysm is also slightly enlarged..    Evaluation of the pelvis is markedly limited by streak artifact from   bilateral hip replacements.    < end of copied text >

## 2018-06-23 NOTE — DISCHARGE NOTE ADULT - CARE PLAN
Principal Discharge DX:	Urinary tract infection with hematuria, site unspecified  Goal:	Continue with abx for 3 more days  Assessment and plan of treatment:	You were given a medication called ceftin (cefuroxime), which you will need to continue for 3 more days  Secondary Diagnosis:	Influenza A  Assessment and plan of treatment:	Please finish tamiflu, which will be finished in 2 days (oseltamavir)  Secondary Diagnosis:	AAA (abdominal aortic aneurysm)  Assessment and plan of treatment:	Please follow up with Dr. Enamorado for your 6mo appointment Principal Discharge DX:	Urinary tract infection with hematuria, site unspecified  Goal:	Continue with abx for 3 more days  Assessment and plan of treatment:	You were given a medication called ceftin (cefuroxime), which you will need to continue for 3 more days  Secondary Diagnosis:	Influenza A  Goal:	to continue to take the tamiflu  Assessment and plan of treatment:	Please finish tamiflu, which will be finished in 2 days (oseltamavir)  Secondary Diagnosis:	AAA (abdominal aortic aneurysm)  Goal:	to follow up with Dr. Enamorado within one week of discharge.  Assessment and plan of treatment:	Please follow up with Dr. Enamorado as soon as you are discharged from the hospital. Please call his office on Monday, 6/25/18, and make the next available appointment to see Dr. Enamorado.  Secondary Diagnosis:	Iliac aneurysm  Goal:	to follow up with Dr. Enamorado within one week of discharge.  Assessment and plan of treatment:	Please follow up with Dr. Enamorado as soon as you are discharged from the hospital. Please call his office on Monday, 6/25/18, and make the next available appointment to see Dr. Enamorado.

## 2018-06-23 NOTE — DISCHARGE NOTE ADULT - PLAN OF CARE
Continue with abx for 3 more days You were given a medication called ceftin (cefuroxime), which you will need to continue for 3 more days Please finish tamiflu, which will be finished in 2 days (oseltamavir) Please follow up with Dr. Enamorado for your 6mo appointment Please follow up with Dr. Enamorado as soon as you are discharged from the hospital. Please call his office on Monday, 6/25/18, and make the next available appointment to see Dr. Enamorado. to follow up with Dr. Enamorado within one week of discharge. to continue to take the tamiflu

## 2018-06-23 NOTE — DISCHARGE NOTE ADULT - CARE PROVIDER_API CALL
Kenrick Enamorado), Vascular Surgery  [address]  [address] [place] [address]  Phone: [phone]  Fax: [phone]

## 2018-06-23 NOTE — PROGRESS NOTE ADULT - PROBLEM SELECTOR PLAN 3
-cont. home atorvastatin 20mg PO daily  -cont. home losartan 50mg PO daily

## 2018-06-23 NOTE — PROGRESS NOTE ADULT - PROBLEM SELECTOR PROBLEM 3
Abdominal aortic aneurysm (AAA) without rupture

## 2018-06-23 NOTE — PROGRESS NOTE ADULT - SUBJECTIVE AND OBJECTIVE BOX
Patient is a 73y old  Male who presents with a chief complaint of Complicated urinary tract infection (20 Jun 2018 16:40)    SUBJECTIVE / OVERNIGHT EVENTS:  Patient had complaint of headache overnight; received motrin.   No hematuria, CP, palpitation, sob.     MEDICATIONS  (STANDING):  atorvastatin 20 milliGRAM(s) Oral at bedtime  cefTRIAXone   IVPB 1 Gram(s) IV Intermittent every 24 hours  enoxaparin Injectable 40 milliGRAM(s) SubCutaneous daily  HYDROcodone/homatropine Syrup 5 milliLiter(s) Oral every 6 hours  losartan 50 milliGRAM(s) Oral daily  oseltamivir 75 milliGRAM(s) Oral two times a day  sodium chloride 0.9%. 1000 milliLiter(s) (75 mL/Hr) IV Continuous <Continuous>  tamsulosin 0.4 milliGRAM(s) Oral at bedtime    MEDICATIONS  (PRN):  acetaminophen   Tablet 650 milliGRAM(s) Oral every 6 hours PRN For Temp greater than 38 C (100.4 F)  ondansetron Injectable 4 milliGRAM(s) IV Push every 6 hours PRN Nausea and/or Vomiting    Vital Signs Last 24 Hrs  T(C): 36.4 (23 Jun 2018 06:03), Max: 37.1 (22 Jun 2018 11:48)  T(F): 97.6 (23 Jun 2018 06:03), Max: 98.7 (22 Jun 2018 11:48)  HR: 66 (23 Jun 2018 06:03) (66 - 79)  BP: 142/94 (23 Jun 2018 06:03) (110/80 - 142/94)  BP(mean): --  RR: 18 (23 Jun 2018 06:03) (18 - 18)  SpO2: 97% (23 Jun 2018 06:03) (95% - 97%)    I&O's Summary    22 Jun 2018 07:01  -  23 Jun 2018 07:00  --------------------------------------------------------  IN: 2720 mL / OUT: 1100 mL / NET: 1620 mL    PHYSICAL EXAM:  GENERAL: NAD, well-groomed, well-developed  HEAD:  Atraumatic, Normocephalic  EYES: EOMI, PERRLA, conjunctiva and sclera clear  ENMT: No tonsillar erythema, exudates, or enlargement; Moist mucous membranes,   NECK: Supple, No JVD, Normal thyroid  HEART: Regular rate and rhythm; No murmurs, rubs, or gallops  RESPIRATORY: CTA B/L, No W/R/R  ABDOMEN: Suprapubic tenderness. Abdomen mildly distended and soft. +BS  NEUROLOGY: A&Ox3, nonfocal, CN II-XII grossly intact, sensation intact, no gait abnormalities bilaterally;  EXTREMITIES:  2+ Peripheral Pulses, No clubbing, cyanosis, or edema  SKIN: warm, dry, normal color, no rash or abnormal lesions    LABS:                        11.5   9.62  )-----------( 152      ( 22 Jun 2018 08:17 )             34.2     Auto Eosinophil # 0.06  / Auto Eosinophil % 0.6   / Auto Neutrophil # 7.87  / Auto Neutrophil % 81.9  / BANDS % x                            11.5   12.87 )-----------( 138      ( 21 Jun 2018 08:11 )             35.4     Auto Eosinophil # 0.00  / Auto Eosinophil % 0.0   / Auto Neutrophil # 10.75 / Auto Neutrophil % 83.5  / BANDS % x        06-22    137  |  102  |  14  ----------------------------<  120<H>  4.1   |  23  |  1.07    Ca    8.3<L>      22 Jun 2018 06:17

## 2018-06-25 LAB
CULTURE RESULTS: SIGNIFICANT CHANGE UP
CULTURE RESULTS: SIGNIFICANT CHANGE UP
SPECIMEN SOURCE: SIGNIFICANT CHANGE UP
SPECIMEN SOURCE: SIGNIFICANT CHANGE UP

## 2018-06-29 ENCOUNTER — APPOINTMENT (OUTPATIENT)
Dept: INTERNAL MEDICINE | Facility: CLINIC | Age: 73
End: 2018-06-29
Payer: MEDICARE

## 2018-06-29 VITALS
TEMPERATURE: 98 F | SYSTOLIC BLOOD PRESSURE: 110 MMHG | OXYGEN SATURATION: 95 % | BODY MASS INDEX: 28.64 KG/M2 | DIASTOLIC BLOOD PRESSURE: 70 MMHG | WEIGHT: 189 LBS | HEIGHT: 68 IN | HEART RATE: 86 BPM

## 2018-06-29 PROCEDURE — 99496 TRANSJ CARE MGMT HIGH F2F 7D: CPT

## 2018-06-29 RX ORDER — CEFUROXIME AXETIL 250 MG/1
250 TABLET ORAL
Qty: 6 | Refills: 0 | Status: COMPLETED | COMMUNITY
Start: 2018-06-23

## 2018-06-29 NOTE — PHYSICAL EXAM
[No Acute Distress] : no acute distress [Normal Sclera/Conjunctiva] : normal sclera/conjunctiva [Normal Outer Ear/Nose] : the outer ears and nose were normal in appearance [Normal Oropharynx] : the oropharynx was normal [Normal TMs] : both tympanic membranes were normal [No JVD] : no jugular venous distention [No Respiratory Distress] : no respiratory distress  [Clear to Auscultation] : lungs were clear to auscultation bilaterally [No Accessory Muscle Use] : no accessory muscle use [Normal Rate] : normal rate  [Regular Rhythm] : with a regular rhythm [Normal S1, S2] : normal S1 and S2 [No Murmur] : no murmur heard [No Edema] : there was no peripheral edema [de-identified] : No sinus tenderness.

## 2018-06-29 NOTE — HISTORY OF PRESENT ILLNESS
[de-identified] : Patient for follow-up from a recent admission to [facility] from 6/20/18 to 6/23/18.  Patient had been diagnosed with influenza, and he was past the Tamiflu window.  He then felt that he was developing a urinary tract infection, with urinary frequency.  He was given ceftriaxone in the hospital, and admitted as he looked very weak and fatigued.  Patient was switched to oral cefuroxime when the sensitivities were back, and he has completed a 7 day course of antibiotics.  He was given a course of Tamiflu in the hospital also.  He took Tessalon Perles for the cough.  Patient is overall feeling much better, but has some residual weakness, and feels hot and sweaty.  No nausea, vomiting, diarrhea, fevers, chills, dysuria, hematuria, or urinary frequency.\par \par Patient also had a CT scan showing that his AAA was 5.1 to 5.5cm.  He will be following up with Dr. Enamorado for this.

## 2018-07-01 ENCOUNTER — FORM ENCOUNTER (OUTPATIENT)
Age: 73
End: 2018-07-01

## 2018-07-02 ENCOUNTER — APPOINTMENT (OUTPATIENT)
Dept: CT IMAGING | Facility: IMAGING CENTER | Age: 73
End: 2018-07-02
Payer: MEDICARE

## 2018-07-02 ENCOUNTER — OUTPATIENT (OUTPATIENT)
Dept: OUTPATIENT SERVICES | Facility: HOSPITAL | Age: 73
LOS: 1 days | End: 2018-07-02
Payer: MEDICARE

## 2018-07-02 DIAGNOSIS — Z96.643 PRESENCE OF ARTIFICIAL HIP JOINT, BILATERAL: Chronic | ICD-10-CM

## 2018-07-02 DIAGNOSIS — Z00.8 ENCOUNTER FOR OTHER GENERAL EXAMINATION: ICD-10-CM

## 2018-07-02 PROCEDURE — 74174 CTA ABD&PLVS W/CONTRAST: CPT

## 2018-07-02 PROCEDURE — 74174 CTA ABD&PLVS W/CONTRAST: CPT | Mod: 26

## 2018-07-02 PROCEDURE — 82565 ASSAY OF CREATININE: CPT

## 2018-07-06 ENCOUNTER — APPOINTMENT (OUTPATIENT)
Dept: INTERNAL MEDICINE | Facility: CLINIC | Age: 73
End: 2018-07-06

## 2018-07-10 ENCOUNTER — APPOINTMENT (OUTPATIENT)
Dept: VASCULAR SURGERY | Facility: CLINIC | Age: 73
End: 2018-07-10
Payer: MEDICARE

## 2018-07-10 VITALS
HEIGHT: 68 IN | BODY MASS INDEX: 28.64 KG/M2 | TEMPERATURE: 98.2 F | HEART RATE: 79 BPM | WEIGHT: 189 LBS | SYSTOLIC BLOOD PRESSURE: 121 MMHG | DIASTOLIC BLOOD PRESSURE: 83 MMHG

## 2018-07-10 PROCEDURE — 99213 OFFICE O/P EST LOW 20 MIN: CPT

## 2018-07-12 ENCOUNTER — APPOINTMENT (OUTPATIENT)
Dept: INTERNAL MEDICINE | Facility: CLINIC | Age: 73
End: 2018-07-12
Payer: MEDICARE

## 2018-07-12 VITALS
HEIGHT: 68 IN | BODY MASS INDEX: 29.4 KG/M2 | OXYGEN SATURATION: 97 % | HEART RATE: 79 BPM | TEMPERATURE: 98.3 F | DIASTOLIC BLOOD PRESSURE: 86 MMHG | SYSTOLIC BLOOD PRESSURE: 130 MMHG | WEIGHT: 194 LBS

## 2018-07-12 PROCEDURE — 99213 OFFICE O/P EST LOW 20 MIN: CPT

## 2018-07-12 NOTE — PHYSICAL EXAM
[No Acute Distress] : no acute distress [Well Nourished] : well nourished [Well Developed] : well developed [PERRL] : pupils equal round and reactive to light [EOMI] : extraocular movements intact [Supple] : supple [No Lymphadenopathy] : no lymphadenopathy [No Respiratory Distress] : no respiratory distress  [Clear to Auscultation] : lungs were clear to auscultation bilaterally [Normal Rate] : normal rate  [Normal S1, S2] : normal S1 and S2 [No Murmur] : no murmur heard [No Edema] : there was no peripheral edema [Soft] : abdomen soft [Non Tender] : non-tender [Non-distended] : non-distended [Normal Bowel Sounds] : normal bowel sounds [de-identified] : ++PND, slight OP erythema but no exudate

## 2018-07-12 NOTE — ASSESSMENT
[FreeTextEntry1] : Patient may have a recurrent URI since yesterday. Start Flonase for PND. Refilled Tessalon for cough. If sx worsen or persist in next few days, he will start a Z-pack. He will call back office PRN.

## 2018-07-12 NOTE — REVIEW OF SYSTEMS
[Fever] : no fever [Chills] : no chills [Fatigue] : fatigue [Earache] : no earache [Postnasal Drip] : postnasal drip [Nasal Discharge] : no nasal discharge [Sore Throat] : sore throat [Hoarseness] : no hoarseness [Chest Pain] : no chest pain [Shortness Of Breath] : no shortness of breath [Cough] : cough [Abdominal Pain] : no abdominal pain [Nausea] : no nausea [Dysuria] : no dysuria [Frequency] : no frequency

## 2018-07-12 NOTE — HISTORY OF PRESENT ILLNESS
[FreeTextEntry8] : Pt comes for a sick visit.\par \par He developed a sore throat and headache yesterday. He feels fatigued and weak. He continues to have a cough for past few weeks. He is taking Tessalon for cough. He denies fever, chills, or sweats. Of note, he was admitted to [facility] 6/20-6/23 for influenza and E.coli UTI. He was treated with Ceftin and Tamiflu. He had prior travelled to [facility]. He took Advil last night.

## 2018-07-20 ENCOUNTER — MESSAGE (OUTPATIENT)
Age: 73
End: 2018-07-20

## 2018-08-17 PROBLEM — M25.561 PAIN IN RIGHT KNEE: Chronic | Status: ACTIVE | Noted: 2018-04-12

## 2018-08-17 PROBLEM — I72.3 ANEURYSM OF ILIAC ARTERY: Chronic | Status: ACTIVE | Noted: 2018-04-12

## 2018-08-17 PROBLEM — I10 ESSENTIAL (PRIMARY) HYPERTENSION: Chronic | Status: ACTIVE | Noted: 2018-04-12

## 2018-08-17 PROBLEM — I83.90 ASYMPTOMATIC VARICOSE VEINS OF UNSPECIFIED LOWER EXTREMITY: Chronic | Status: ACTIVE | Noted: 2018-04-12

## 2018-08-17 PROBLEM — E78.5 HYPERLIPIDEMIA, UNSPECIFIED: Chronic | Status: ACTIVE | Noted: 2018-04-12

## 2018-08-17 PROBLEM — N20.0 CALCULUS OF KIDNEY: Chronic | Status: ACTIVE | Noted: 2018-06-20

## 2018-08-24 ENCOUNTER — MESSAGE (OUTPATIENT)
Age: 73
End: 2018-08-24

## 2018-09-21 ENCOUNTER — NON-APPOINTMENT (OUTPATIENT)
Age: 73
End: 2018-09-21

## 2018-09-21 ENCOUNTER — APPOINTMENT (OUTPATIENT)
Dept: INTERNAL MEDICINE | Facility: CLINIC | Age: 73
End: 2018-09-21
Payer: MEDICARE

## 2018-09-21 VITALS
TEMPERATURE: 98.4 F | HEART RATE: 81 BPM | BODY MASS INDEX: 30.16 KG/M2 | WEIGHT: 199 LBS | OXYGEN SATURATION: 95 % | SYSTOLIC BLOOD PRESSURE: 124 MMHG | HEIGHT: 68 IN | DIASTOLIC BLOOD PRESSURE: 90 MMHG

## 2018-09-21 PROCEDURE — 36415 COLL VENOUS BLD VENIPUNCTURE: CPT

## 2018-09-21 PROCEDURE — 93000 ELECTROCARDIOGRAM COMPLETE: CPT | Mod: 59

## 2018-09-21 PROCEDURE — G0008: CPT

## 2018-09-21 PROCEDURE — 90662 IIV NO PRSV INCREASED AG IM: CPT

## 2018-09-21 PROCEDURE — G0444 DEPRESSION SCREEN ANNUAL: CPT

## 2018-09-21 PROCEDURE — G0439: CPT

## 2018-09-21 RX ORDER — AZITHROMYCIN 250 MG/1
250 TABLET, FILM COATED ORAL
Qty: 1 | Refills: 0 | Status: COMPLETED | COMMUNITY
Start: 2018-07-12 | End: 2018-09-21

## 2018-09-21 RX ORDER — BENZONATATE 100 MG/1
100 CAPSULE ORAL EVERY 8 HOURS
Qty: 30 | Refills: 0 | Status: COMPLETED | COMMUNITY
Start: 2018-07-12 | End: 2018-09-21

## 2018-09-29 NOTE — PHYSICAL EXAM
[No Acute Distress] : no acute distress [Well Nourished] : well nourished [Well Developed] : well developed [Well-Appearing] : well-appearing [Normal Voice/Communication] : normal voice/communication [Normal Sclera/Conjunctiva] : normal sclera/conjunctiva [PERRL] : pupils equal round and reactive to light [EOMI] : extraocular movements intact [Normal Outer Ear/Nose] : the outer ears and nose were normal in appearance [Normal Oropharynx] : the oropharynx was normal [Normal Nasal Mucosa] : the nasal mucosa was normal [No JVD] : no jugular venous distention [Supple] : supple [No Lymphadenopathy] : no lymphadenopathy [Thyroid Normal, No Nodules] : the thyroid was normal and there were no nodules present [No Respiratory Distress] : no respiratory distress  [Clear to Auscultation] : lungs were clear to auscultation bilaterally [No Accessory Muscle Use] : no accessory muscle use [Normal Rate] : normal rate  [Regular Rhythm] : with a regular rhythm [Normal S1, S2] : normal S1 and S2 [No Murmur] : no murmur heard [No Carotid Bruits] : no carotid bruits [No Abdominal Bruit] : a ~M bruit was not heard ~T in the abdomen [No Varicosities] : no varicosities [Pedal Pulses Present] : the pedal pulses are present [No Edema] : there was no peripheral edema [No Extremity Clubbing/Cyanosis] : no extremity clubbing/cyanosis [No Palpable Aorta] : no palpable aorta [Soft] : abdomen soft [Non Tender] : non-tender [Non-distended] : non-distended [No Masses] : no abdominal mass palpated [No HSM] : no HSM [Normal Bowel Sounds] : normal bowel sounds [Urethral Meatus] : meatus normal [Urinary Bladder Findings] : the bladder was normal on palpation [Scrotum] : the scrotum was normal [Testes Mass (___cm)] : there were no testicular masses [No Prostate Nodules] : no prostate nodules [Normal Posterior Cervical Nodes] : no posterior cervical lymphadenopathy [Normal Anterior Cervical Nodes] : no anterior cervical lymphadenopathy [No CVA Tenderness] : no CVA  tenderness [No Spinal Tenderness] : no spinal tenderness [No Joint Swelling] : no joint swelling [Grossly Normal Strength/Tone] : grossly normal strength/tone [No Rash] : no rash [Normal Gait] : normal gait [Coordination Grossly Intact] : coordination grossly intact [No Focal Deficits] : no focal deficits [Deep Tendon Reflexes (DTR)] : deep tendon reflexes were 2+ and symmetric [Normal Affect] : the affect was normal [Normal Insight/Judgement] : insight and judgment were intact [de-identified] : Negative straight leg raise.

## 2018-09-29 NOTE — ASSESSMENT
[FreeTextEntry1] : (1) HCM - discussed diet, exercise, weight maintenance.  Labs ordered as below.  Hepatitis C screening negative 1/12/16.  HIV testing offered to patient and patient declined.  Influenza vaccination given today.  Patient is UTD on Prevnar (1/12/2016), Pneumovax (12/27/2013), Tdap (7/16/2014), and he believes he received the Zostavax about 10 years ago.  He can get the new Shingrix at a later date.  Colonoscopy 4/24/2017 with Dr. Mahesh Ponce showed 1 polyp, and 5 year follow-up was recommended.  Patient was given written information on preparing advance directives.\par \par (2)  - patient self discontinued the Flomax and Proscar for BPH as per Dr. Rivera.  Patient was also being monitored for recent kidney stones.  He declines returning to the urologist at this time.\par \par (3) CV - patient had a normal stress test in 2015 with Dr. Negrete.  Continue losartan for BP and atorvastatin for lipids.\par \par (4) Prediabetes - A1c checked today.  Patient reminded to avoid sweets and to have controlled portions of carbohydrates in his diet.\par \par (5) Sciatica - patient declines further evaluation at this time.  He can use a heating pad.  Would be cautious with NSAIDs due to CKD.  Would refer to PT if needed.

## 2018-09-29 NOTE — HEALTH RISK ASSESSMENT
[Very Good] : ~his/her~  mood as very good [No falls in past year] : Patient reported no falls in the past year [0] : 2) Feeling down, depressed, or hopeless: Not at all (0) [Patient reported colonoscopy was abnormal] : Patient reported colonoscopy was abnormal [HIV Test offered] : HIV Test offered [With Family] : lives with family [Employed] : employed [] :  [Sexually Active] : sexually active [Feels Safe at Home] : Feels safe at home [Fully functional (bathing, dressing, toileting, transferring, walking, feeding)] : Fully functional (bathing, dressing, toileting, transferring, walking, feeding) [Fully functional (using the telephone, shopping, preparing meals, housekeeping, doing laundry, using] : Fully functional and needs no help or supervision to perform IADLs (using the telephone, shopping, preparing meals, housekeeping, doing laundry, using transportation, managing medications and managing finances) [Smoke Detector] : smoke detector [Carbon Monoxide Detector] : carbon monoxide detector [Seat Belt] :  uses seat belt [Sunscreen] : uses sunscreen [None] : None [Discussed at today's visit] : Advance Directives Discussed at today's visit [] : No [de-identified] : Former cigar smoker [de-identified] : Several drinks a week. [[id number]] : 0 [Change in mental status noted] : No change in mental status noted [Language] : denies difficulty with language [Behavior] : denies difficulty with behavior [Handling Complex Tasks] : denies difficulty handling complex tasks [Reasoning] : denies difficulty with reasoning [High Risk Behavior] : no high risk behavior [Reports changes in hearing] : Reports no changes in hearing [Reports changes in vision] : Reports no changes in vision [Reports changes in dental health] : Reports no changes in dental health [ColonoscopyDate] : 04/24/2017 [ColonoscopyComments] : 1 polyp, Dr. Mahesh Ponce.  5 year follow-up [HepatitisCDate] : 01/12/2016 [HepatitisCComments] : Negative [de-identified] : Part-time [FreeTextEntry2] : [employer] [de-identified] : No h/o STDs. [de-identified] : Reading and distance glasses.  Sees ophthalmologist (Dr. Rousseau). [de-identified] : Going 10/4/18. [FreeTextEntry4] : Patient has not yet completed a Health Care Proxy, but has the paperwork.

## 2018-09-29 NOTE — HISTORY OF PRESENT ILLNESS
[de-identified] : Patient presents for a subsequent Medicare Annual Wellness Visit.\par \par Breakfast - egg whites, bread, coffee.  Lunch - chicken cutlets, potato, salad.  Dinner - fish, chicken, steak.  More white than red meat.  Has vegetables.  No junk food.  No fried foods.\par \par Exercise - Bowling for 2 hours once weekly.  Stopped going to the gym in 2016 due to medical issues.\par \par Patient has sciatica.

## 2018-11-27 ENCOUNTER — MESSAGE (OUTPATIENT)
Age: 73
End: 2018-11-27

## 2018-12-11 ENCOUNTER — APPOINTMENT (OUTPATIENT)
Dept: OTOLARYNGOLOGY | Facility: CLINIC | Age: 73
End: 2018-12-11
Payer: MEDICARE

## 2018-12-11 VITALS
HEART RATE: 79 BPM | DIASTOLIC BLOOD PRESSURE: 85 MMHG | SYSTOLIC BLOOD PRESSURE: 130 MMHG | HEIGHT: 69 IN | WEIGHT: 195 LBS | BODY MASS INDEX: 28.88 KG/M2

## 2018-12-11 PROCEDURE — 31575 DIAGNOSTIC LARYNGOSCOPY: CPT

## 2018-12-11 PROCEDURE — 99204 OFFICE O/P NEW MOD 45 MIN: CPT | Mod: 25

## 2018-12-11 RX ORDER — ESOMEPRAZOLE MAGNESIUM 40 MG/1
40 CAPSULE, DELAYED RELEASE ORAL DAILY
Qty: 30 | Refills: 2 | Status: COMPLETED | COMMUNITY
Start: 2018-12-11 | End: 2019-03-11

## 2018-12-19 ENCOUNTER — LABORATORY RESULT (OUTPATIENT)
Age: 73
End: 2018-12-19

## 2018-12-19 ENCOUNTER — APPOINTMENT (OUTPATIENT)
Dept: PULMONOLOGY | Facility: CLINIC | Age: 73
End: 2018-12-19
Payer: MEDICARE

## 2018-12-19 VITALS
HEART RATE: 89 BPM | HEIGHT: 68 IN | SYSTOLIC BLOOD PRESSURE: 130 MMHG | BODY MASS INDEX: 30.31 KG/M2 | DIASTOLIC BLOOD PRESSURE: 80 MMHG | OXYGEN SATURATION: 96 % | RESPIRATION RATE: 17 BRPM | WEIGHT: 200 LBS

## 2018-12-19 DIAGNOSIS — R09.82 POSTNASAL DRIP: ICD-10-CM

## 2018-12-19 DIAGNOSIS — R11.10 VOMITING, UNSPECIFIED: ICD-10-CM

## 2018-12-19 PROCEDURE — 71046 X-RAY EXAM CHEST 2 VIEWS: CPT

## 2018-12-19 PROCEDURE — 94618 PULMONARY STRESS TESTING: CPT

## 2018-12-19 PROCEDURE — 94060 EVALUATION OF WHEEZING: CPT | Mod: 59

## 2018-12-19 PROCEDURE — 95012 NITRIC OXIDE EXP GAS DETER: CPT

## 2018-12-19 PROCEDURE — ZZZZZ: CPT

## 2018-12-19 PROCEDURE — 94729 DIFFUSING CAPACITY: CPT

## 2018-12-19 PROCEDURE — 99205 OFFICE O/P NEW HI 60 MIN: CPT | Mod: 25

## 2018-12-19 PROCEDURE — 94727 GAS DIL/WSHOT DETER LNG VOL: CPT

## 2018-12-24 ENCOUNTER — MEDICATION RENEWAL (OUTPATIENT)
Age: 73
End: 2018-12-24

## 2018-12-24 LAB
25(OH)D3 SERPL-MCNC: 36.4 NG/ML
BASOPHILS # BLD AUTO: 0.03 K/UL
BASOPHILS NFR BLD AUTO: 0.6 %
DEPRECATED KAPPA LC FREE/LAMBDA SER: 0.99 RATIO
EOSINOPHIL # BLD AUTO: 0.2 K/UL
EOSINOPHIL NFR BLD AUTO: 3.8 %
HCT VFR BLD CALC: 43.3 %
HGB BLD-MCNC: 14.1 G/DL
HP PNL SER: NORMAL
IGA SER QL IEP: 343 MG/DL
IGG SER QL IEP: 1424 MG/DL
IGG SUBSET TOTAL IGG: 1330 MG/DL
IGG1 SER-MCNC: 429 MG/DL
IGG2 SER-MCNC: 679 MG/DL
IGG3 SER-MCNC: 205 MG/DL
IGG4 SER-MCNC: 54 MG/DL
IGM SER QL IEP: 85 MG/DL
IMM GRANULOCYTES NFR BLD AUTO: 0.2 %
KAPPA LC CSF-MCNC: 3.94 MG/DL
KAPPA LC SERPL-MCNC: 3.9 MG/DL
LYMPHOCYTES # BLD AUTO: 0.73 K/UL
LYMPHOCYTES NFR BLD AUTO: 14 %
MAN DIFF?: NORMAL
MCHC RBC-ENTMCNC: 29.4 PG
MCHC RBC-ENTMCNC: 32.6 GM/DL
MCV RBC AUTO: 90.2 FL
MONOCYTES # BLD AUTO: 0.6 K/UL
MONOCYTES NFR BLD AUTO: 11.5 %
NEUTROPHILS # BLD AUTO: 3.64 K/UL
NEUTROPHILS NFR BLD AUTO: 69.9 %
PLATELET # BLD AUTO: 168 K/UL
RBC # BLD: 4.8 M/UL
RBC # FLD: 14.4 %
WBC # FLD AUTO: 5.21 K/UL

## 2018-12-26 ENCOUNTER — FORM ENCOUNTER (OUTPATIENT)
Age: 73
End: 2018-12-26

## 2018-12-27 ENCOUNTER — APPOINTMENT (OUTPATIENT)
Dept: CT IMAGING | Facility: IMAGING CENTER | Age: 73
End: 2018-12-27
Payer: MEDICARE

## 2018-12-27 ENCOUNTER — OUTPATIENT (OUTPATIENT)
Dept: OUTPATIENT SERVICES | Facility: HOSPITAL | Age: 73
LOS: 1 days | End: 2018-12-27
Payer: MEDICARE

## 2018-12-27 DIAGNOSIS — Z96.643 PRESENCE OF ARTIFICIAL HIP JOINT, BILATERAL: Chronic | ICD-10-CM

## 2018-12-27 DIAGNOSIS — R05 COUGH: ICD-10-CM

## 2018-12-27 DIAGNOSIS — R91.1 SOLITARY PULMONARY NODULE: ICD-10-CM

## 2018-12-27 PROCEDURE — 71250 CT THORAX DX C-: CPT

## 2018-12-27 PROCEDURE — 71250 CT THORAX DX C-: CPT | Mod: 26

## 2018-12-27 PROCEDURE — 71046 X-RAY EXAM CHEST 2 VIEWS: CPT | Mod: 26

## 2018-12-27 PROCEDURE — 71046 X-RAY EXAM CHEST 2 VIEWS: CPT

## 2018-12-31 ENCOUNTER — APPOINTMENT (OUTPATIENT)
Dept: PULMONOLOGY | Facility: CLINIC | Age: 73
End: 2018-12-31

## 2019-01-02 ENCOUNTER — APPOINTMENT (OUTPATIENT)
Dept: OTOLARYNGOLOGY | Facility: CLINIC | Age: 74
End: 2019-01-02

## 2019-01-02 ENCOUNTER — APPOINTMENT (OUTPATIENT)
Dept: PULMONOLOGY | Facility: CLINIC | Age: 74
End: 2019-01-02
Payer: MEDICARE

## 2019-01-02 LAB
A ALTERNATA IGE QN: <0.1 KUA/L
A FUMIGATUS IGE QN: <0.1 KUA/L
C ALBICANS IGE QN: <0.1 KUA/L
C HERBARUM IGE QN: <0.1 KUA/L
CAT DANDER IGE QN: <0.1 KUA/L
CLAM IGE QN: <0.1 KUA/L
CODFISH IGE QN: <0.1 KUA/L
COMMON RAGWEED IGE QN: <0.1 KUA/L
CORN IGE QN: <0.1 KUA/L
COW MILK IGE QN: <0.1 KUA/L
D FARINAE IGE QN: <0.1 KUA/L
D PTERONYSS IGE QN: <0.1 KUA/L
DEPRECATED A ALTERNATA IGE RAST QL: 0
DEPRECATED A FUMIGATUS IGE RAST QL: 0
DEPRECATED C ALBICANS IGE RAST QL: 0
DEPRECATED C HERBARUM IGE RAST QL: 0
DEPRECATED CAT DANDER IGE RAST QL: 0
DEPRECATED CLAM IGE RAST QL: 0
DEPRECATED CODFISH IGE RAST QL: 0
DEPRECATED COMMON RAGWEED IGE RAST QL: 0
DEPRECATED CORN IGE RAST QL: 0
DEPRECATED COW MILK IGE RAST QL: 0
DEPRECATED D FARINAE IGE RAST QL: 0
DEPRECATED D PTERONYSS IGE RAST QL: 0
DEPRECATED DOG DANDER IGE RAST QL: 0
DEPRECATED EGG WHITE IGE RAST QL: 0
DEPRECATED M RACEMOSUS IGE RAST QL: 0
DEPRECATED PEANUT IGE RAST QL: 0
DEPRECATED ROACH IGE RAST QL: 0
DEPRECATED SCALLOP IGE RAST QL: <0.1 KUA/L
DEPRECATED SESAME SEED IGE RAST QL: 0
DEPRECATED SHRIMP IGE RAST QL: 0
DEPRECATED SOYBEAN IGE RAST QL: 0
DEPRECATED TIMOTHY IGE RAST QL: 0
DEPRECATED WALNUT IGE RAST QL: 0
DEPRECATED WHEAT IGE RAST QL: 0
DEPRECATED WHITE OAK IGE RAST QL: 0
DOG DANDER IGE QN: <0.1 KUA/L
EGG WHITE IGE QN: <0.1 KUA/L
M RACEMOSUS IGE QN: <0.1 KUA/L
PEANUT IGE QN: <0.1 KUA/L
ROACH IGE QN: <0.1 KUA/L
SCALLOP IGE QN: 0
SCALLOP IGE QN: <0.1 KUA/L
SESAME SEED IGE QN: <0.1 KUA/L
SOYBEAN IGE QN: <0.1 KUA/L
TIMOTHY IGE QN: <0.1 KUA/L
TOTAL IGE SMQN RAST: 26 KU/L
WALNUT IGE QN: <0.1 KUA/L
WHEAT IGE QN: <0.1 KUA/L
WHITE OAK IGE QN: <0.1 KUA/L

## 2019-01-02 PROCEDURE — 95070 INHLJ BRNCL CHALLENGE TSTG: CPT

## 2019-01-02 PROCEDURE — 94070 EVALUATION OF WHEEZING: CPT

## 2019-01-03 ENCOUNTER — APPOINTMENT (OUTPATIENT)
Dept: PULMONOLOGY | Facility: CLINIC | Age: 74
End: 2019-01-03
Payer: MEDICARE

## 2019-01-03 ENCOUNTER — NON-APPOINTMENT (OUTPATIENT)
Age: 74
End: 2019-01-03

## 2019-01-03 VITALS
OXYGEN SATURATION: 97 % | RESPIRATION RATE: 16 BRPM | SYSTOLIC BLOOD PRESSURE: 130 MMHG | HEART RATE: 77 BPM | BODY MASS INDEX: 30.77 KG/M2 | DIASTOLIC BLOOD PRESSURE: 80 MMHG | WEIGHT: 203 LBS | HEIGHT: 68 IN

## 2019-01-03 DIAGNOSIS — R91.8 OTHER NONSPECIFIC ABNORMAL FINDING OF LUNG FIELD: ICD-10-CM

## 2019-01-03 PROCEDURE — 99214 OFFICE O/P EST MOD 30 MIN: CPT | Mod: 25

## 2019-01-03 PROCEDURE — 94010 BREATHING CAPACITY TEST: CPT

## 2019-01-03 NOTE — REVIEW OF SYSTEMS
[Postnasal Drip] : postnasal drip [As Noted in HPI] : as noted in HPI [Cough] : cough [Dyspnea] : dyspnea [Negative] : Sleep Disorder

## 2019-01-03 NOTE — ADDENDUM
[FreeTextEntry1] : Documented by Edilson Jimenez acting as a scribe for Dr. Chico Collado on 1/3/19.\par \par All medical record entries made by the Scribe were at my, Dr. Chico Collado's, direction and personally dictated by me on 1/3/19. I have reviewed the chart and agree that the record accurately reflects my personal performance of the history, physical exam, procedure, assessment and plan. I have also personally directed, reviewed, and agree with the discharge instructions. \par \par

## 2019-01-03 NOTE — PHYSICAL EXAM

## 2019-01-03 NOTE — PROCEDURE
[FreeTextEntry1] : PFT revealed normal flows, with a FEV1 of  3.21 ,which is 108% of predicted, with a normal flow volume loop\par \par MCT (1.2.19) reveals positive methacholine challenge test\par  \par Chest CT (12.18.18) reveals no evidence of tracheomalacia. Focal mild narrowing of the mid to upper trachea is unchanged since December 22, 2016. Small right pulmonary nodules are unchanged\par \par

## 2019-01-03 NOTE — ASSESSMENT
[FreeTextEntry1] : This is a 73 year old male with PMHx of HTN, Hypercholesterolemia, skin cancer, AAA s/p repair and chronic cough who is in for re-evaluation for his chronic cough associated with post tussive emesis.\par \par His cough is due to:\par -allergies/ post nasal drip\par -asthma (+ MCT)\par - GERD ( secondary reflux)\par -tracheal stenosis (sensory neuropathic cough)\par \par problem 1: allergies / sinus\par -continue Azelastine 1 sniff each nostril BID\par \par Environmental measures for allergies were encouraged including mattress and pillow cover, air purifier, and environmental controls.\par \par problem 2: cough variant asthma\par -add Breo Ellipta 200, one inhalation QD \par -add Singular 10mg QHS \par -add Ventolin 2 inhalations PRN up to Q6H \par \par -Inhaler technique reviewed as well as oral hygiene techniques reviewed with patient. Avoidance of cold air, extremes of temperature, rescue inhaler should be used before exercise. Order of medication reviewed with patient. Recommended use of a cool mist humidifier in the bedroom. \par Asthma is believed to be caused by inherited (genetic) and environmental factor, but its exact cause is unknown. Asthma may be triggered by allergens, lung infections, or irritants in the air. Asthma triggers are different for each person \par \par problem: LRP/GERD\par -continue Esomeprazole 40 mg QAM\par -continue Zantac 300 mg QHS\par \par -Rule of 2s: avoid eating too much, eating too fast, eating too late, eating too spicy, eating two hours before bed\par -Things to avoid including overeating, spicy foods, tight clothing, eating within three hours of bed, this list is not all inclusive. \par -For treatment of reflux, possible options discussed including diet control, H2 blockers, PPIs, as well as coating motility agents discussed as treatment options. Timing of meals and proximity of last meal to sleep were discussed. If symptoms persist, a formal gastrointestinal evaluation is needed. 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 River\par \par Problem 7 : Low Vitamin D\par -recommended to take Vitamin D, 50,000 units every 2 weeks\par \par Low Vitamin D has been associated with asthma exacerbations and increased allergic symptoms. The goal based on recent information is maintaining levels between 50-70 and low normal is 30. Recommended 50,000 units every two weeks to once a month depending on the level. \par \par Problem 8 : health maintenance\par -recommended strep pneumonia vaccines: Prevnar-13 vaccine, followed by Pneumo vaccine 23 one year following\par -recommended early intervention for URIs\par -recommended regular osteoporosis evaluations\par -recommended early dermatological evaluations\par -recommended after the age of 50 to the age of 70, colonoscopy every 5 years \par \par  F/U in 6-8 weeks\par Patient is encouraged to call with any changes, concerns, or questions.  \par

## 2019-01-03 NOTE — HISTORY OF PRESENT ILLNESS
[FreeTextEntry1] : Mr. Cesar is a 73 year old male with a history of chronic cough, pulmonary nodules, RADs. LPR, post nasal drip who comes into the office today for a follow up visit. His chief complaint is his cough\par -he states he is not feeling well, noting he has been coughing constantly\par -he reports talking, laughing and exercise can bring on his cough\par -he notes he can occasionally vomit when he has a coughing fit\par -he states he will also get short of breath with stairs or when walking quickly \par -he reports he coughs more when he is in a rush and is more quiet when he is more relaxed\par -his wife notes he does not snore at night\par -he reports he no longer exercises\par -he states he frequently has post nasal drip\par -he denies any headaches, sweats, chest pain, chest pressure, diarrhea, constipation, dysphagia, dizziness, sour taste in the mouth\par

## 2019-01-07 LAB
DEPRECATED S PNEUM 1 IGG SER-MCNC: 4.9 MCG/ML
DEPRECATED S PNEUM12 AB SER-ACNC: NORMAL
DEPRECATED S PNEUM14 AB SER-ACNC: 21.3 MCG/ML
DEPRECATED S PNEUM17 IGG SER IA-MCNC: 9.9 MCG/ML
DEPRECATED S PNEUM18 IGG SER IA-MCNC: 4.4 MCG/ML
DEPRECATED S PNEUM19 IGG SER-MCNC: 10.4 MCG/ML
DEPRECATED S PNEUM19 IGG SER-MCNC: 4 MCG/ML
DEPRECATED S PNEUM2 IGG SER-MCNC: 22.4 MCG/ML
DEPRECATED S PNEUM20 IGG SER-MCNC: 12 MCG/ML
DEPRECATED S PNEUM22 IGG SER-MCNC: 21.7 MCG/ML
DEPRECATED S PNEUM23 AB SER-ACNC: 20.8 MCG/ML
DEPRECATED S PNEUM3 AB SER-ACNC: 5.8 MCG/ML
DEPRECATED S PNEUM34 IGG SER-MCNC: 3.9 MCG/ML
DEPRECATED S PNEUM4 AB SER-ACNC: 0.8 MCG/ML
DEPRECATED S PNEUM5 IGG SER-MCNC: >150 MCG/ML
DEPRECATED S PNEUM6 IGG SER-MCNC: 35.4 MCG/ML
DEPRECATED S PNEUM7 IGG SER-ACNC: 11.4 MCG/ML
DEPRECATED S PNEUM8 AB SER-ACNC: 37 MCG/ML
DEPRECATED S PNEUM9 AB SER-ACNC: 4.9 MCG/ML
DEPRECATED S PNEUM9 IGG SER-MCNC: 2.9 MCG/ML
STREPTOCOCCUS PNEUMONIAE SEROTYPE 11A: 29.1 MCG/ML
STREPTOCOCCUS PNEUMONIAE SEROTYPE 15B: 2.9 MCG/ML
STREPTOCOCCUS PNEUMONIAE SEROTYPE 33F: 2 MCG/ML

## 2019-01-10 ENCOUNTER — MESSAGE (OUTPATIENT)
Age: 74
End: 2019-01-10

## 2019-01-10 LAB
25(OH)D3 SERPL-MCNC: 34.3 NG/ML
ALBUMIN SERPL ELPH-MCNC: 4.4 G/DL
ALP BLD-CCNC: 71 U/L
ALT SERPL-CCNC: 14 U/L
ANION GAP SERPL CALC-SCNC: 14 MMOL/L
APPEARANCE: CLEAR
AST SERPL-CCNC: 17 U/L
BACTERIA: NEGATIVE
BASOPHILS # BLD AUTO: 0.03 K/UL
BASOPHILS NFR BLD AUTO: 0.4 %
BILIRUB SERPL-MCNC: 0.4 MG/DL
BILIRUBIN URINE: NEGATIVE
BLOOD URINE: NEGATIVE
BUN SERPL-MCNC: 42 MG/DL
CALCIUM SERPL-MCNC: 9.4 MG/DL
CHLORIDE SERPL-SCNC: 104 MMOL/L
CHOLEST SERPL-MCNC: 164 MG/DL
CHOLEST/HDLC SERPL: 4.2 RATIO
CO2 SERPL-SCNC: 23 MMOL/L
COLOR: YELLOW
CREAT SERPL-MCNC: 2.11 MG/DL
EOSINOPHIL # BLD AUTO: 0.23 K/UL
EOSINOPHIL NFR BLD AUTO: 3.1 %
GLUCOSE QUALITATIVE U: NEGATIVE MG/DL
GLUCOSE SERPL-MCNC: 90 MG/DL
HBA1C MFR BLD HPLC: 5.5 %
HCT VFR BLD CALC: 38.2 %
HDLC SERPL-MCNC: 39 MG/DL
HGB BLD-MCNC: 11.7 G/DL
HIV1+2 AB SPEC QL IA.RAPID: NONREACTIVE
IMM GRANULOCYTES NFR BLD AUTO: 0.3 %
KETONES URINE: NEGATIVE
LDLC SERPL CALC-MCNC: 97 MG/DL
LEUKOCYTE ESTERASE URINE: NEGATIVE
LYMPHOCYTES # BLD AUTO: 1.68 K/UL
LYMPHOCYTES NFR BLD AUTO: 22.8 %
MAN DIFF?: NORMAL
MCHC RBC-ENTMCNC: 28.7 PG
MCHC RBC-ENTMCNC: 30.6 GM/DL
MCV RBC AUTO: 93.9 FL
MICROSCOPIC-UA: NORMAL
MONOCYTES # BLD AUTO: 0.39 K/UL
MONOCYTES NFR BLD AUTO: 5.3 %
NEUTROPHILS # BLD AUTO: 5.03 K/UL
NEUTROPHILS NFR BLD AUTO: 68.1 %
NITRITE URINE: NEGATIVE
PH URINE: 5.5
PLATELET # BLD AUTO: 189 K/UL
POTASSIUM SERPL-SCNC: 4.3 MMOL/L
PROT SERPL-MCNC: 7.5 G/DL
PROTEIN URINE: NEGATIVE MG/DL
PSA SERPL-MCNC: 3.11 NG/ML
RBC # BLD: 4.07 M/UL
RBC # FLD: 15.8 %
RED BLOOD CELLS URINE: 1 /HPF
SODIUM SERPL-SCNC: 141 MMOL/L
SPECIFIC GRAVITY URINE: 1.02
SQUAMOUS EPITHELIAL CELLS: 0 /HPF
T4 FREE SERPL-MCNC: 1.4 NG/DL
TRIGL SERPL-MCNC: 140 MG/DL
TSH SERPL-ACNC: 2.3 UIU/ML
UROBILINOGEN URINE: NEGATIVE MG/DL
WBC # FLD AUTO: 7.38 K/UL
WHITE BLOOD CELLS URINE: 1 /HPF

## 2019-01-11 ENCOUNTER — MEDICATION RENEWAL (OUTPATIENT)
Age: 74
End: 2019-01-11

## 2019-03-28 ENCOUNTER — APPOINTMENT (OUTPATIENT)
Dept: PULMONOLOGY | Facility: CLINIC | Age: 74
End: 2019-03-28

## 2019-04-17 ENCOUNTER — NON-APPOINTMENT (OUTPATIENT)
Age: 74
End: 2019-04-17

## 2019-04-17 ENCOUNTER — APPOINTMENT (OUTPATIENT)
Dept: PULMONOLOGY | Facility: CLINIC | Age: 74
End: 2019-04-17
Payer: MEDICARE

## 2019-04-17 VITALS
WEIGHT: 206 LBS | HEIGHT: 68 IN | BODY MASS INDEX: 31.22 KG/M2 | RESPIRATION RATE: 16 BRPM | HEART RATE: 69 BPM | OXYGEN SATURATION: 99 % | DIASTOLIC BLOOD PRESSURE: 70 MMHG | SYSTOLIC BLOOD PRESSURE: 130 MMHG

## 2019-04-17 DIAGNOSIS — J31.0 CHRONIC RHINITIS: ICD-10-CM

## 2019-04-17 DIAGNOSIS — R05 COUGH: ICD-10-CM

## 2019-04-17 DIAGNOSIS — K21.9 GASTRO-ESOPHAGEAL REFLUX DISEASE W/OUT ESOPHAGITIS: ICD-10-CM

## 2019-04-17 PROCEDURE — 94010 BREATHING CAPACITY TEST: CPT

## 2019-04-17 PROCEDURE — 99214 OFFICE O/P EST MOD 30 MIN: CPT | Mod: 25

## 2019-04-17 PROCEDURE — 95012 NITRIC OXIDE EXP GAS DETER: CPT

## 2019-04-17 NOTE — ASSESSMENT
[FreeTextEntry1] : This is a 73 year old male with PMHx of HTN, Hypercholesterolemia, skin cancer, AAA s/p repair and chronic cough, asthma, allergy, GERD, tracheal stenosis who is in for re-evaluation for his chronic cough associated with post tussive emesis. - controlled with "whole clove" \par \par His cough is due to:\par -allergies/ post nasal drip\par -asthma (+ MCT)\par - GERD ( secondary reflux)\par -tracheal stenosis (sensory neuropathic cough)\par \par problem 1: allergies / sinus\par -continue Azelastine (.15) 1 sniff each nostril BID\par \par Environmental measures for allergies were encouraged including mattress and pillow cover, air purifier, and environmental controls.\par \par problem 2: cough variant asthma (off all Rx) \par -using "whole clove" once perday. \par -Breo Ellipta 200, one inhalation QD \par -Singular 10mg QHS \par -Ventolin 2 inhalations PRN up to Q6H \par \par -Inhaler technique reviewed as well as oral hygiene techniques reviewed with patient. Avoidance of cold air, extremes of temperature, rescue inhaler should be used before exercise. Order of medication reviewed with patient. Recommended use of a cool mist humidifier in the bedroom. \par Asthma is believed to be caused by inherited (genetic) and environmental factor, but its exact cause is unknown. Asthma may be triggered by allergens, lung infections, or irritants in the air. Asthma triggers are different for each person \par \par problem: LRP/GERD\par -continue Esomeprazole 40 mg QAM\par -continue Zantac 300 mg QHS\par \par -Rule of 2s: avoid eating too much, eating too fast, eating too late, eating too spicy, eating two hours before bed\par -Things to avoid including overeating, spicy foods, tight clothing, eating within three hours of bed, this list is not all inclusive. \par -For treatment of reflux, possible options discussed including diet control, H2 blockers, PPIs, as well as coating motility agents discussed as treatment options. Timing of meals and proximity of last meal to sleep were discussed. If symptoms persist, a formal gastrointestinal evaluation is needed. 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 River\par \par Problem 7 : Low Vitamin D\par -recommended to take Vitamin D, 50,000 units every 2 weeks\par \par Low Vitamin D has been associated with asthma exacerbations and increased allergic symptoms. The goal based on recent information is maintaining levels between 50-70 and low normal is 30. Recommended 50,000 units every two weeks to once a month depending on the level. \par \par Problem 8 : health maintenance\par -recommended strep pneumonia vaccines: Prevnar-13 vaccine, followed by Pneumo vaccine 23 one year following\par -recommended early intervention for URIs\par -recommended regular osteoporosis evaluations\par -recommended early dermatological evaluations\par -recommended after the age of 50 to the age of 70, colonoscopy every 5 years \par \par  F/U in 4 months with spi \par Patient is encouraged to call with any changes, concerns, or questions.  \par

## 2019-04-17 NOTE — PROCEDURE
[FreeTextEntry1] : PFT revealed normal flows, with a FEV1 of  2.23 L ,which is 109% of predicted, with a normal flow volume loop\par \par FENO was 12 ; a normal value being less than 25\par \par Fractional exhaled nitric oxide (FENO) is regarded as a simple, noninvasive method for assessing eosinophilic airway inflammation. Produced by a variety of cells within the lung, nitric oxide (NO) concentrations are generally low in healthy individuals. However, high concentrations of NO appear to be involved in nonspecific host defense mechanisms and chronic inflammatory diseases such as asthma. The American Thoracic Society (ATS) therefore has recommended using FENO to aid in the diagnosis and monitoring of eosinophilic airway inflammation and asthma, and for identifying steroid responsive individuals whose chronic respiratory symptoms may be caused by airway inflammation. \par \par

## 2019-04-17 NOTE — ADDENDUM
[FreeTextEntry1] : Documented by Cristina Valera acting as a scribe for Dr. Chico Collado on 4/17/2019.\par \par All medical record entries made by the scribe, Cristina Valera, were at my, Dr. Chico Collado's, direction and personally dictated by me on 4/17/2019. I have reviewed the chart and agree that the record accurately reflects my personal performance of the history, physical exam, assessment and plan. I have also personally directed, reviewed, and agree with the discharge instructions.

## 2019-04-17 NOTE — HISTORY OF PRESENT ILLNESS
[FreeTextEntry1] : Mr. Cesar is a 73 year old male with a history of chronic cough, pulmonary nodules, RADs. LPR, post nasal drip who comes into the office today for a follow up visit. His chief complaint is his cough\par -he states he is not feeling well, noting he has been coughing constantly\par -he reports talking, laughing and exercise can bring on his cough\par -he notes he can occasionally vomit when he has a coughing fit\par -he states he will also get short of breath with stairs or when walking quickly \par -he reports he coughs more when he is in a rush and is more quiet when he is more relaxed\par -his wife notes he does not snore at night\par -he has started taking a whole clove once a day which has resolved the cough. \par -he states he frequently has post nasal drip. \par -he denies any headaches, sweats, chest pain, chest pressure, diarrhea, constipation, dysphagia, dizziness, sour taste in the mouth\par

## 2019-05-10 NOTE — DISCUSSION/SUMMARY
[Home] : patient was discharged to home [Other:_____] : [unfilled] [FreeTextEntry1] : Spoke with patient regarding his recent hospitalization to [facility] from 5/4/19-5/9/19.  He is s/p an aneurysm repair.  He reports he is feeling well.  He stated that he is unable to travel for 10 days until he sees his surgeon.  He mentioned his BP was elevated in the hospital but reports that no medication changes were made.  I did urge him to be seen but he declined saying after he sees his surgeon he will call for an appointment here.  He was advised to call the office for any issues or concerns.  Verbal understanding was confirmed.  Dr Brennan was made aware.

## 2019-05-23 NOTE — ED ADULT TRIAGE NOTE - HEIGHT IN FEET
Subjective:   Chief Complaint: Chest Pain  Last Clinic Visit: New Patient    History of Present Illness: Eugenie Coates is a 70 y.o. lady with hypertension, hyperlipidemia, GERD, diabetes, restrictive lung disease, and a recent admission to the ER with SVT who presents to discuss the above.  She reports that for the past 2 years she has had palpitations.  Reports that the palpitations come on suddenly, and last anywhere from a few minutes, to 15-20 minutes.  Nothing precipitates the symptoms, happen at rest, and with exertion, and palpitations are associated with significant shortness of breath, dyspnea on exertion, and chest discomfort.  She has not taken anything for the palpitations, and denies any maneuvers to make them go away.  Denies any illicits, no excessive caffeine intake, and no recent medication changes.  She has some perseveration over antihypertensive medications, and potential relationship of antihypertensive medications to her palpitations.  She was seen recently in the [facility] and was noted to be on hydrochlorothiazide however no hypokalemia was documented, she was found to be in SVT, which resolved spontaneously.  Her hydrochlorothiazide was stopped, and she was placed on metoprolol.  She denies any palpitations since that episode.    She has a secondary chest pain, reports the chest pain is at rest, worse when she lays down, worse when she moves in particular positions, and denies any exertional component.  She has a history of GERD, and is on a PPI., also history of hiatal hernia.    She reports only taking 20 mg lisinopril    PMHx:  Hypertension  Hyperlipidemia  GERD  Diabetes  Restrictive lung disease.    Review of Systems   Constitution: Negative.   HENT: Negative.    Eyes: Negative.    Cardiovascular: Positive for irregular heartbeat and palpitations.   Respiratory: Negative.    Hematologic/Lymphatic: Negative.    Skin: Negative.    Musculoskeletal: Negative.   "  Gastrointestinal: Negative.    Genitourinary: Negative.      Medications:  Current Outpatient Medications on File Prior to Visit   Medication Sig    atorvastatin (LIPITOR) 40 MG tablet Take 1 tablet (40 mg total) by mouth once daily.    bimatoprost (LUMIGAN) 0.01 % Drop Place 1 drop into both eyes every evening.    blood sugar diagnostic Strp Test twice daily.  Accucheck viva test strips and lancets.    brimonidine 0.2% (ALPHAGAN) 0.2 % Drop Place 1 drop into both eyes 3 (three) times daily.    lancets (ACCU-CHEK SOFTCLIX LANCETS) Misc 1 Units by Misc.(Non-Drug; Combo Route) route 2 (two) times daily.    metFORMIN (GLUCOPHAGE) 500 MG tablet Take 1 tablet (500 mg total) by mouth daily with breakfast.    omeprazole (PRILOSEC) 20 MG capsule TAKE 1 CAPSULE(20 MG) BY MOUTH EVERY DAY    [DISCONTINUED] amLODIPine (NORVASC) 10 MG tablet Take 1 tablet (10 mg total) by mouth every evening.    [DISCONTINUED] lisinopril (PRINIVIL,ZESTRIL) 40 MG tablet Take 1 tablet (40 mg total) by mouth once daily. (Patient taking differently: Take 40 mg by mouth once daily. )    [DISCONTINUED] metoprolol tartrate (LOPRESSOR) 50 MG tablet Take 1 tablet (50 mg total) by mouth 2 (two) times daily.    [DISCONTINUED] omeprazole (PRILOSEC OTC) 20 MG tablet Take 1 tablet (20 mg total) by mouth once daily.     No current facility-administered medications on file prior to visit.      Family History:  Eugenie's family history includes Diabetes in her mother; No Known Problems in her brother, father, maternal aunt, maternal grandfather, maternal grandmother, maternal uncle, paternal aunt, paternal grandfather, paternal grandmother, paternal uncle, and sister.    Social History:  Eugenie reports that she has never smoked. She has never used smokeless tobacco. She reports that she does not drink alcohol or use drugs.    Objective:   BP (!) 150/80   Pulse (!) 52   Ht 5' 2" (1.575 m)   Wt 82.2 kg (181 lb 3.5 oz)   SpO2 96%   BMI 33.15 " kg/m²     Physical Exam   Constitutional: She is oriented to person, place, and time and well-developed, well-nourished, and in no distress. No distress.   HENT:   Head: Normocephalic and atraumatic.   Mouth/Throat: No oropharyngeal exudate.   Eyes: EOM are normal. No scleral icterus.   Neck: No JVD present. No tracheal deviation present. No thyromegaly present.   Cardiovascular: Normal rate and regular rhythm. Exam reveals no gallop and no friction rub.   No murmur heard.  Pulmonary/Chest: Effort normal and breath sounds normal. No respiratory distress. She has no wheezes. She has no rales. She exhibits no tenderness.   Abdominal: Soft. She exhibits no distension. There is no tenderness. There is no rebound and no guarding.   Musculoskeletal: Normal range of motion. She exhibits no edema.   Neurological: She is alert and oriented to person, place, and time.   Skin: Skin is warm and dry. She is not diaphoretic. No erythema.   Psychiatric: Affect normal.     EKG:  My independent visualization of EKG from ER visit shows narrow complex supraventricular tachycardia with short RP interval, P-wave axis superiorly directed, and RP interval 120 milliseconds    TTE:  08/21/2018  CONCLUSIONS     1 - Normal left ventricular systolic function (EF 60-65%).     2 - Normal right ventricular systolic function .     3 - The estimated PA systolic pressure is 34 mmHg.     4 - Impaired LV relaxation, increased LVEDP.      Lipids:  Recent Labs   Lab 08/06/18  0946   LDL CHOLESTEROL 134.8   HDL 44   CHOLESTEROL 205 H      Renal:  Recent Labs   Lab 05/14/19  1135   POTASSIUM 3.7   CO2 31 H   BUN BLD 15   CREATININE 1.0     Liver:  Recent Labs   Lab 05/14/19  1135   AST 15   ALT 13         Assessment:     1. AV reentrant tachycardia    2. Hypertension, benign    3. Essential hypertension    4. Severe obesity (BMI 35.0-35.9 with comorbidity)        Plan:   1. AV reentrant tachycardia  Palpitations consistent with SVT.  Narrow complex SVT  with superiorly directed P waves, short RP tachycardia, however RP interval 120 milliseconds, thus suspect this could be AVRT.  No aberrancy on baseline EKG. Discussed different potential therapeutic options including medication and ablated therapy.  She would like to pursue definitive management, will refer to EP.  - Ambulatory Referral to Electrophysiology    2. Hypertension, benign  Above goal, she reports only taking 20 mg of lisinopril, will increase to 40 mg as prescribed  - lisinopril (PRINIVIL,ZESTRIL) 40 MG tablet; Take 1 tablet (40 mg total) by mouth once daily.  Dispense: 90 tablet; Refill: 3    3. Essential hypertension  Continue above as well as metoprolol and amlodipine  - metoprolol tartrate (LOPRESSOR) 50 MG tablet; Take 1 tablet (50 mg total) by mouth 2 (two) times daily.  Dispense: 60 tablet; Refill: 11  - amLODIPine (NORVASC) 10 MG tablet; Take 1 tablet (10 mg total) by mouth every evening.  Dispense: 90 tablet; Refill: 3    4. Severe obesity (BMI 35.0-35.9 with comorbidity)  Encouraged weight loss      Follow up if symptoms worsen or fail to improve.       5

## 2019-05-28 ENCOUNTER — RX RENEWAL (OUTPATIENT)
Age: 74
End: 2019-05-28

## 2019-06-03 ENCOUNTER — APPOINTMENT (OUTPATIENT)
Dept: ORTHOPEDIC SURGERY | Facility: CLINIC | Age: 74
End: 2019-06-03
Payer: MEDICARE

## 2019-06-03 VITALS — WEIGHT: 206 LBS | HEIGHT: 68 IN | BODY MASS INDEX: 31.22 KG/M2

## 2019-06-03 PROCEDURE — 99214 OFFICE O/P EST MOD 30 MIN: CPT

## 2019-06-03 PROCEDURE — 72052 X-RAY EXAM NECK SPINE 6/>VWS: CPT

## 2019-06-03 NOTE — DISCUSSION/SUMMARY
[de-identified] : Patient is to ice his neck as needed. He was given prescription and is advised to begin physical therapy. He was given ergonomics. Advised that he can take Tylenol as needed. Follow up in 1 month

## 2019-06-03 NOTE — HISTORY OF PRESENT ILLNESS
[de-identified] : Patient presenting for initial consultation of left shoulder pain radiating downward left upper extremity. Pain initially presented three months ago. Recently underwent repair of abdominal aneurysm two weeks ago. He notes no numbness and tingling into the fingers. The pain doesn't radiate to the right biceps .

## 2019-06-03 NOTE — PHYSICAL EXAM
[de-identified] : Constitutional\par \par o\par  Appearance : well-nourished, well developed, alert, in no acute distress  \par \par Head and Face\par  Head :\par  Inspection : atraumatic, normocephalico\par  Face :\par  Inspection : no visible rash or discoloration\par \par Respiratory\par  Respiratory Effort: breathing unlabored \par \par Neurologic\par  Sensation : Normal sensation\par \par Psychiatric\par  Mood and Affect: mood normal, affect appropriate \par \par Lymphatic\par  Additional Nodes : No palpable lymph nodes present \par \par \par Cervical Spine\par  Musculoskeletal Examination : full flexion and rotation of the cervical spine. No paracervical tenderness.\par \par  Inspection/Palpation : right clavicular tenderness, right scapular tenderness, slightly decreased sensitivity C5 region \par  Inspection : alignment midline, normal degree of lordosis present\par ¦\par  Skin : normal appearance, no masses or tenderness, trachea midline\par ¦\par  Palpation : musculature is nontender to palpation\par \par  Range of Motion : arc of motion full in all planes, no crepitance or pain with ROM\par \par Tests:\par  Negative Spurling’s test\par \par \par Right\par  Upper Extremity\par  Right Shoulder \par ¦\par  Inspection/Palpation : no tenderness, swelling or deformities, hyperactive relax sensation \par \par  Range of Motion : full and painless in all planes, no crepitance\par \par  Strength : forward elevation, internal rotation, external rotation, external rotation at 90 degrees of abduction, supraspinatus, adduction and abduction 4+/5\par \par  Stability : no joint instability on provocative testing \par \par Tests:\par  Santizo negative, Neer negative, negative drop arm test\par \par \par Left Upper Extremity\par  Left Shoulder :¦\par  Inspection/Palpation : no tenderness, swelling or deformities\par \par  Range of Motion : full and painless in all planes, no crepitance\par \par  Strength : forward elevation, internal rotation, external rotation, external rotation at 90 degrees of abduction, supraspinatus, adduction and abduction 5/5\par \par  Stability : no joint instability on provocative testing\par \par Tests:\par  Santizo negative, negative Neer and Nela test, negative drop arm test\par \par \par Gait and Station:\par Gait: gait normal, no significant extremity swelling or lymphedema, good proprioception and balance\par \par \par Radiology Results:\par Cervical Spine: AP, lateral and obliques of the neck were obtained and revealed degenerative disc disease at C5-6, C6-7 with diffuse facet arthropathy and foraminal stenosis at C5-6, C6-7.

## 2019-06-03 NOTE — ADDENDUM
[FreeTextEntry1] : I, Mercy Brooks, acted solely as a scribe for Dr. Portillo Rincon on this date 6/3/19.

## 2019-07-08 ENCOUNTER — APPOINTMENT (OUTPATIENT)
Dept: ORTHOPEDIC SURGERY | Facility: CLINIC | Age: 74
End: 2019-07-08
Payer: MEDICARE

## 2019-07-08 VITALS — HEIGHT: 68 IN | BODY MASS INDEX: 31.22 KG/M2 | WEIGHT: 206 LBS

## 2019-07-08 PROCEDURE — 99213 OFFICE O/P EST LOW 20 MIN: CPT

## 2019-07-08 NOTE — PHYSICAL EXAM
[de-identified] : Constitutional\par o Appearance : well-nourished, well developed, alert, in no acute distress\par Head and Face\par o Head :\par ¦ Inspection : atraumatic, normocephalic\par o Face :\par ¦ Inspection : no visible rash or discoloration\par Respiratory\par o Respiratory Effort: breathing unlabored\par Neurologic\par o Sensation : Normal sensation\par Psychiatric\par o Mood and Affect: mood normal, affect appropriate\par Lymphatic\par o Additional Nodes : No palpable lymph nodes present\par \par Cervical Spine\par o Inspection/Palpation :\par ¦ Inspection : alignment midline, normal degree of lordosis present\par ¦ Skin : normal appearance, no masses, trachea midline\par ¦ Palpation : musculature is nontender to palpation.\par o Range of Motion : extension causes mild discomfort, limited rotation\par Tests: Negative Spurling’s test, -Rhomberg  test \par Reflexes: Biceps/Triceps 2+ bilaterally\par \par Right Upper Extremity\par o Right Shoulder :\par ¦ Inspection/Palpation : no tenderness, swelling or deformities, no biceps rupture\par ¦ Range of Motion : full and painless in all planes, no crepitance\par ¦ Strength : forward elevation, internal rotation, external rotation, external rotation at 90 degrees of abduction, supraspinatus,\par adduction and abduction 5/5\par ¦ Stability : no joint instability on provocative testing\par Tests: Santizo negative, Neer negative, negative drop arm test\par \par Left Upper Extremity\par o Left Shoulder :\par ¦ Inspection/Palpation : no tenderness, swelling or deformities\par ¦ Range of Motion : full and painless in all planes, no crepitance\par ¦ Strength : forward elevation, internal rotation, external rotation, external rotation at 90 degrees of abduction, supraspinatus,\par adduction and abduction 5/5\par ¦ Stability : no joint instability on provocative testing\par Tests: Santizo negative, negative Neer and Nela test, negative drop arm test\par \par Gait and Station:\par Gait: gait normal, no significant extremity swelling or lymphedema, good proprioception and balance

## 2019-07-08 NOTE — ADDENDUM
[FreeTextEntry1] : I, Tarun West, acted solely as a scribe for Dr. Portillo Rincon on this date 07/08/2019 \par All medical record entries made by the Scribe were at my, Dr. Portillo Rincon, direction and personally dictated by me on 07/08/2019 . I have reviewed the chart and agree that the record accurately reflects my personal performance of the history, physical exam, assessment and plan. I have also personally directed, reviewed, and agreed with the chart.

## 2019-07-08 NOTE — HISTORY OF PRESENT ILLNESS
[de-identified] : 74 year old male presents with right neck pain. He denies numbness and tingling, and denies any radiation of the pain. He has been attending PT, and states that it provided him with some relief. He has recently started going to the gym, and is an avid bicyclist.  He feels he can continue the treatment on his own without PT.

## 2019-07-08 NOTE — DISCUSSION/SUMMARY
[de-identified] : I discussed the underlying pathophysiology of the patient's condition in great detail with the patient. I told the patient to continue ROM exercises for his neck such as rotating his neck. I provided the patient with an exercise sheet containing at-home exercises for his neck. I reviewed this sheet and demonstrated exercises for him in extensive detail. I told the patient to refrain from watching TV where he has to strain his neck upwards. We discussed the use of ice, Tylenol and anti-inflammatories to relieve pain. \par \par \par FU PRN

## 2019-08-16 ENCOUNTER — APPOINTMENT (OUTPATIENT)
Dept: PULMONOLOGY | Facility: CLINIC | Age: 74
End: 2019-08-16

## 2019-08-21 NOTE — H&P PST ADULT - NS SC CAGE ALCOHOL GUILTY ABOUT
PRINCIPAL DISCHARGE DIAGNOSIS  Diagnosis: Syncope and collapse  Assessment and Plan of Treatment:       SECONDARY DISCHARGE DIAGNOSES  Diagnosis: Non-ST elevation MI (NSTEMI)  Assessment and Plan of Treatment:
no

## 2019-10-08 ENCOUNTER — APPOINTMENT (OUTPATIENT)
Dept: INTERNAL MEDICINE | Facility: CLINIC | Age: 74
End: 2019-10-08

## 2019-10-08 ENCOUNTER — APPOINTMENT (OUTPATIENT)
Dept: INTERNAL MEDICINE | Facility: CLINIC | Age: 74
End: 2019-10-08
Payer: MEDICARE

## 2019-10-08 VITALS — DIASTOLIC BLOOD PRESSURE: 90 MMHG | SYSTOLIC BLOOD PRESSURE: 158 MMHG

## 2019-10-08 VITALS
BODY MASS INDEX: 30.77 KG/M2 | WEIGHT: 203 LBS | DIASTOLIC BLOOD PRESSURE: 110 MMHG | TEMPERATURE: 97.9 F | SYSTOLIC BLOOD PRESSURE: 160 MMHG | HEART RATE: 73 BPM | HEIGHT: 68 IN | OXYGEN SATURATION: 96 %

## 2019-10-08 PROCEDURE — G0439: CPT

## 2019-10-08 PROCEDURE — 90686 IIV4 VACC NO PRSV 0.5 ML IM: CPT

## 2019-10-08 PROCEDURE — 82270 OCCULT BLOOD FECES: CPT

## 2019-10-08 PROCEDURE — G0444 DEPRESSION SCREEN ANNUAL: CPT

## 2019-10-08 PROCEDURE — 36415 COLL VENOUS BLD VENIPUNCTURE: CPT

## 2019-10-08 PROCEDURE — G0008: CPT

## 2019-10-08 RX ORDER — BENZONATATE 200 MG/1
200 CAPSULE ORAL 3 TIMES DAILY
Qty: 90 | Refills: 1 | Status: DISCONTINUED | COMMUNITY
Start: 2018-12-19 | End: 2019-10-08

## 2019-10-08 RX ORDER — FLUTICASONE PROPIONATE 50 UG/1
50 SPRAY, METERED NASAL
Qty: 1 | Refills: 1 | Status: DISCONTINUED | COMMUNITY
Start: 2018-07-12 | End: 2019-10-08

## 2019-10-08 RX ORDER — BUDESONIDE AND FORMOTEROL FUMARATE DIHYDRATE 160; 4.5 UG/1; UG/1
160-4.5 AEROSOL RESPIRATORY (INHALATION) TWICE DAILY
Qty: 1 | Refills: 3 | Status: DISCONTINUED | COMMUNITY
Start: 2019-01-03 | End: 2019-10-08

## 2019-10-08 RX ORDER — FLUTICASONE FUROATE AND VILANTEROL TRIFENATATE 200; 25 UG/1; UG/1
200-25 POWDER RESPIRATORY (INHALATION) DAILY
Qty: 3 | Refills: 1 | Status: DISCONTINUED | COMMUNITY
Start: 2019-01-03 | End: 2019-10-08

## 2019-10-08 RX ORDER — RANITIDINE 300 MG/1
300 TABLET ORAL
Qty: 30 | Refills: 1 | Status: DISCONTINUED | COMMUNITY
Start: 2018-12-19 | End: 2019-10-08

## 2019-10-08 RX ORDER — MONTELUKAST 10 MG/1
10 TABLET, FILM COATED ORAL
Qty: 1 | Refills: 1 | Status: DISCONTINUED | COMMUNITY
Start: 2019-01-03 | End: 2019-10-08

## 2019-10-08 RX ORDER — FLUTICASONE PROPIONATE 50 UG/1
50 SPRAY, METERED NASAL DAILY
Qty: 1 | Refills: 3 | Status: DISCONTINUED | COMMUNITY
Start: 2018-12-11 | End: 2019-10-08

## 2019-10-08 RX ORDER — AZELASTINE HYDROCHLORIDE 137 UG/1
0.1 SPRAY, METERED NASAL TWICE DAILY
Qty: 3 | Refills: 1 | Status: DISCONTINUED | COMMUNITY
Start: 2018-12-19 | End: 2019-10-08

## 2019-10-08 RX ORDER — ALBUTEROL SULFATE 90 UG/1
108 (90 BASE) AEROSOL, METERED RESPIRATORY (INHALATION) EVERY 6 HOURS
Qty: 1 | Refills: 5 | Status: DISCONTINUED | COMMUNITY
Start: 2019-01-03 | End: 2019-10-08

## 2019-10-08 NOTE — REVIEW OF SYSTEMS
[Chest Pain] : no chest pain [Shortness Of Breath] : no shortness of breath [Fever] : no fever [Nausea] : no nausea [Abdominal Pain] : no abdominal pain [Constipation] : no constipation [Diarrhea] : no diarrhea [Headache] : no headache [Vomiting] : no vomiting [Depression] : no depression

## 2019-10-08 NOTE — HISTORY OF PRESENT ILLNESS
[FreeTextEntry1] : Annual Physical [de-identified] : ELLIOTT CANCINO is a 73 yo man with hypertension, hypercholesterolemia, CRI, skin cancer, s/p AAA repair here for a annual wellness visit.  He has been well overall.  \par \par The patient's bp is elevated in the office today. He forgot to take his BP meds last night and this morning.  He has been seeing cardiologist Dr Gilbert who added a second BP med to his regimen.  The patient reports that he had a normal stress test, echo and carotid u/s prior to his second AAA repair with Dr Salmon at [facility] in 8/2019.  The patient saw Dr Salmon last week and said all was well.\par \par The patient reports that he was seeing Dr Collado for cough.  He tried the meds but didn't find that they worked great.  His symptoms are improved by chewing clover.\par \par The patient has seen urologist Dr Rivera in the past for BPH.  He was tried on meds in the past that did not work.  The patient was told to consider surgery.\par \par The patient is  with 6 children.  He lives with his wife and one child.  He continues to work in his [employer] business.  He would have no trouble walking 4 to 6 blocks or 2 flights of stairs.  He denies depressive symptoms.

## 2019-10-08 NOTE — ASSESSMENT
[FreeTextEntry1] : HCM\par Labs today\par Colonoscopy utd 2016 due 2026\par Advised dermatology\par Flu shot today\par Continue current meds.  Advised to take meds regularly\par Consider shingrix\par BP check in 6 months

## 2019-10-08 NOTE — PHYSICAL EXAM
[Well Developed] : well developed [Well Nourished] : well nourished [No Acute Distress] : no acute distress [Well-Appearing] : well-appearing [Normal Sclera/Conjunctiva] : normal sclera/conjunctiva [EOMI] : extraocular movements intact [Normal Outer Ear/Nose] : the outer ears and nose were normal in appearance [Normal Oropharynx] : the oropharynx was normal [Normal Nasal Mucosa] : the nasal mucosa was normal [Normal TMs] : both tympanic membranes were normal [No Lymphadenopathy] : no lymphadenopathy [No JVD] : no jugular venous distention [Supple] : supple [No Respiratory Distress] : no respiratory distress  [Thyroid Normal, No Nodules] : the thyroid was normal and there were no nodules present [Clear to Auscultation] : lungs were clear to auscultation bilaterally [No Accessory Muscle Use] : no accessory muscle use [Normal Rate] : normal rate  [Normal S1, S2] : normal S1 and S2 [Regular Rhythm] : with a regular rhythm [No Murmur] : no murmur heard [Pedal Pulses Present] : the pedal pulses are present [No Carotid Bruits] : no carotid bruits [No Edema] : there was no peripheral edema [Non Tender] : non-tender [Soft] : abdomen soft [Non-distended] : non-distended [No Masses] : no abdominal mass palpated [Normal Bowel Sounds] : normal bowel sounds [No HSM] : no HSM [No Hernias] : no hernias [Normal Sphincter Tone] : normal sphincter tone [No Mass] : no mass [Stool Occult Blood] : stool negative for occult blood [Normal Supraclavicular Nodes] : no supraclavicular lymphadenopathy [Normal Axillary Nodes] : no axillary lymphadenopathy [Normal Posterior Cervical Nodes] : no posterior cervical lymphadenopathy [Normal Anterior Cervical Nodes] : no anterior cervical lymphadenopathy [No Joint Swelling] : no joint swelling [No CVA Tenderness] : no CVA  tenderness [No Rash] : no rash [Coordination Grossly Intact] : coordination grossly intact [No Focal Deficits] : no focal deficits [Normal Gait] : normal gait [Speech Grossly Normal] : speech grossly normal [Deep Tendon Reflexes (DTR)] : deep tendon reflexes were 2+ and symmetric [Normal Affect] : the affect was normal [Memory Grossly Normal] : memory grossly normal [Normal Insight/Judgement] : insight and judgment were intact [Normal Mood] : the mood was normal [Alert and Oriented x3] : oriented to person, place, and time [FreeTextEntry1] : Mildly diffusely enlarged prostate.  No nodules

## 2019-10-08 NOTE — HEALTH RISK ASSESSMENT
[Good] : ~his/her~ current health as good [Monthly or less (1 pt)] : Monthly or less (1 point) [] : No [No falls in past year] : Patient reported no falls in the past year [0] : 2) Feeling down, depressed, or hopeless: Not at all (0) [de-identified] : Active lifestyle [YearQuit] : 1980 [CIT9Unpxy] : 0 [Patient reported colonoscopy was normal] : Patient reported colonoscopy was normal [None] : None [With Family] : lives with family [Employed] : employed [] :  [Feels Safe at Home] : Feels safe at home [Fully functional (bathing, dressing, toileting, transferring, walking, feeding)] : Fully functional (bathing, dressing, toileting, transferring, walking, feeding) [Reports changes in hearing] : Reports no changes in hearing [Reports changes in vision] : Reports no changes in vision [Smoke Detector] : smoke detector [Reports changes in dental health] : Reports no changes in dental health [Carbon Monoxide Detector] : carbon monoxide detector [Guns at Home] : no guns at home [Seat Belt] :  uses seat belt [ColonoscopyDate] : 1/2016 [AdvancecareDate] : 1 [ColonoscopyComments] : due 2026 according to patient

## 2019-10-09 LAB
25(OH)D3 SERPL-MCNC: 40.1 NG/ML
ALBUMIN SERPL ELPH-MCNC: 4.7 G/DL
ALP BLD-CCNC: 86 U/L
ALT SERPL-CCNC: 17 U/L
ANION GAP SERPL CALC-SCNC: 12 MMOL/L
APPEARANCE: CLEAR
AST SERPL-CCNC: 19 U/L
BASOPHILS # BLD AUTO: 0.04 K/UL
BASOPHILS NFR BLD AUTO: 0.6 %
BILIRUB SERPL-MCNC: 1 MG/DL
BILIRUBIN URINE: NEGATIVE
BLOOD URINE: NEGATIVE
BUN SERPL-MCNC: 28 MG/DL
CALCIUM SERPL-MCNC: 9.4 MG/DL
CHLORIDE SERPL-SCNC: 103 MMOL/L
CHOLEST SERPL-MCNC: 195 MG/DL
CHOLEST/HDLC SERPL: 4 RATIO
CK SERPL-CCNC: 207 U/L
CO2 SERPL-SCNC: 25 MMOL/L
COLOR: YELLOW
CREAT SERPL-MCNC: 1.63 MG/DL
EOSINOPHIL # BLD AUTO: 0.16 K/UL
EOSINOPHIL NFR BLD AUTO: 2.5 %
ESTIMATED AVERAGE GLUCOSE: 111 MG/DL
GLUCOSE QUALITATIVE U: NEGATIVE
GLUCOSE SERPL-MCNC: 99 MG/DL
HBA1C MFR BLD HPLC: 5.5 %
HCT VFR BLD CALC: 42 %
HDLC SERPL-MCNC: 49 MG/DL
HGB BLD-MCNC: 13.1 G/DL
IMM GRANULOCYTES NFR BLD AUTO: 0.2 %
KETONES URINE: NEGATIVE
LDLC SERPL CALC-MCNC: 132 MG/DL
LEUKOCYTE ESTERASE URINE: NEGATIVE
LYMPHOCYTES # BLD AUTO: 1.06 K/UL
LYMPHOCYTES NFR BLD AUTO: 16.9 %
MAN DIFF?: NORMAL
MCHC RBC-ENTMCNC: 29.5 PG
MCHC RBC-ENTMCNC: 31.2 GM/DL
MCV RBC AUTO: 94.6 FL
MONOCYTES # BLD AUTO: 0.52 K/UL
MONOCYTES NFR BLD AUTO: 8.3 %
NEUTROPHILS # BLD AUTO: 4.5 K/UL
NEUTROPHILS NFR BLD AUTO: 71.5 %
NITRITE URINE: NEGATIVE
PH URINE: 5.5
PLATELET # BLD AUTO: 175 K/UL
POTASSIUM SERPL-SCNC: 4.6 MMOL/L
PROT SERPL-MCNC: 7.4 G/DL
PROTEIN URINE: NORMAL
PSA SERPL-MCNC: 2.9 NG/ML
RBC # BLD: 4.44 M/UL
RBC # FLD: 14.2 %
SODIUM SERPL-SCNC: 140 MMOL/L
SPECIFIC GRAVITY URINE: 1.02
TRIGL SERPL-MCNC: 69 MG/DL
TSH SERPL-ACNC: 2.87 UIU/ML
UROBILINOGEN URINE: NORMAL
WBC # FLD AUTO: 6.29 K/UL

## 2019-11-18 ENCOUNTER — APPOINTMENT (OUTPATIENT)
Dept: ORTHOPEDIC SURGERY | Facility: CLINIC | Age: 74
End: 2019-11-18
Payer: COMMERCIAL

## 2019-11-18 ENCOUNTER — APPOINTMENT (OUTPATIENT)
Dept: INTERNAL MEDICINE | Facility: CLINIC | Age: 74
End: 2019-11-18
Payer: COMMERCIAL

## 2019-11-18 VITALS — HEIGHT: 68 IN | BODY MASS INDEX: 30.77 KG/M2 | WEIGHT: 203 LBS

## 2019-11-18 VITALS
HEIGHT: 68 IN | TEMPERATURE: 99 F | SYSTOLIC BLOOD PRESSURE: 145 MMHG | OXYGEN SATURATION: 96 % | DIASTOLIC BLOOD PRESSURE: 90 MMHG | WEIGHT: 197 LBS | HEART RATE: 67 BPM | BODY MASS INDEX: 29.86 KG/M2

## 2019-11-18 DIAGNOSIS — S16.1XXA STRAIN OF MUSCLE, FASCIA AND TENDON AT NECK LEVEL, INITIAL ENCOUNTER: ICD-10-CM

## 2019-11-18 DIAGNOSIS — M25.552 PAIN IN RIGHT HIP: ICD-10-CM

## 2019-11-18 DIAGNOSIS — M25.551 PAIN IN RIGHT HIP: ICD-10-CM

## 2019-11-18 DIAGNOSIS — M47.816 SPONDYLOSIS W/OUT MYELOPATHY OR RADICULOPATHY, LUMBAR REGION: ICD-10-CM

## 2019-11-18 PROCEDURE — 72100 X-RAY EXAM L-S SPINE 2/3 VWS: CPT

## 2019-11-18 PROCEDURE — 72170 X-RAY EXAM OF PELVIS: CPT

## 2019-11-18 PROCEDURE — 99214 OFFICE O/P EST MOD 30 MIN: CPT

## 2019-11-18 PROCEDURE — 72040 X-RAY EXAM NECK SPINE 2-3 VW: CPT

## 2019-11-18 NOTE — REVIEW OF SYSTEMS
[Fever] : no fever [Vision Problems] : no vision problems [Chest Pain] : no chest pain [Shortness Of Breath] : no shortness of breath [Abdominal Pain] : no abdominal pain

## 2019-11-18 NOTE — PHYSICAL EXAM
[No Acute Distress] : no acute distress [Well Developed] : well developed [Well Nourished] : well nourished [Normal Sclera/Conjunctiva] : normal sclera/conjunctiva [Normal Outer Ear/Nose] : the outer ears and nose were normal in appearance [Normal Oropharynx] : the oropharynx was normal [Normal TMs] : both tympanic membranes were normal [No JVD] : no jugular venous distention [No Lymphadenopathy] : no lymphadenopathy [No Respiratory Distress] : no respiratory distress  [Supple] : supple [No Accessory Muscle Use] : no accessory muscle use [Clear to Auscultation] : lungs were clear to auscultation bilaterally [Normal Rate] : normal rate  [Regular Rhythm] : with a regular rhythm [Normal S1, S2] : normal S1 and S2 [Normal Gait] : normal gait [Alert and Oriented x3] : oriented to person, place, and time
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
## 2019-11-19 LAB
ALBUMIN SERPL ELPH-MCNC: 4.3 G/DL
ALP BLD-CCNC: 84 U/L
ALT SERPL-CCNC: 13 U/L
ANION GAP SERPL CALC-SCNC: 13 MMOL/L
AST SERPL-CCNC: 13 U/L
BASOPHILS # BLD AUTO: 0.04 K/UL
BASOPHILS NFR BLD AUTO: 0.6 %
BILIRUB SERPL-MCNC: 0.6 MG/DL
BUN SERPL-MCNC: 31 MG/DL
CALCIUM SERPL-MCNC: 9.4 MG/DL
CHLORIDE SERPL-SCNC: 104 MMOL/L
CO2 SERPL-SCNC: 23 MMOL/L
CREAT SERPL-MCNC: 2.3 MG/DL
EOSINOPHIL # BLD AUTO: 0.15 K/UL
EOSINOPHIL NFR BLD AUTO: 2.2 %
GLUCOSE SERPL-MCNC: 104 MG/DL
HCT VFR BLD CALC: 39.6 %
HGB BLD-MCNC: 12.9 G/DL
IMM GRANULOCYTES NFR BLD AUTO: 0.3 %
LYMPHOCYTES # BLD AUTO: 1.21 K/UL
LYMPHOCYTES NFR BLD AUTO: 17.5 %
MAN DIFF?: NORMAL
MCHC RBC-ENTMCNC: 30.4 PG
MCHC RBC-ENTMCNC: 32.6 GM/DL
MCV RBC AUTO: 93.4 FL
MONOCYTES # BLD AUTO: 0.55 K/UL
MONOCYTES NFR BLD AUTO: 7.9 %
NEUTROPHILS # BLD AUTO: 4.95 K/UL
NEUTROPHILS NFR BLD AUTO: 71.5 %
PLATELET # BLD AUTO: 166 K/UL
POTASSIUM SERPL-SCNC: 4.7 MMOL/L
PROT SERPL-MCNC: 7.2 G/DL
RBC # BLD: 4.24 M/UL
RBC # FLD: 14.4 %
SODIUM SERPL-SCNC: 140 MMOL/L
WBC # FLD AUTO: 6.92 K/UL

## 2019-11-25 ENCOUNTER — APPOINTMENT (OUTPATIENT)
Dept: NEPHROLOGY | Facility: CLINIC | Age: 74
End: 2019-11-25
Payer: MEDICARE

## 2019-11-25 ENCOUNTER — FORM ENCOUNTER (OUTPATIENT)
Age: 74
End: 2019-11-25

## 2019-11-25 ENCOUNTER — LABORATORY RESULT (OUTPATIENT)
Age: 74
End: 2019-11-25

## 2019-11-25 VITALS
OXYGEN SATURATION: 96 % | HEART RATE: 74 BPM | DIASTOLIC BLOOD PRESSURE: 109 MMHG | HEIGHT: 68 IN | SYSTOLIC BLOOD PRESSURE: 165 MMHG | BODY MASS INDEX: 30.46 KG/M2 | WEIGHT: 201 LBS

## 2019-11-25 VITALS — DIASTOLIC BLOOD PRESSURE: 108 MMHG | SYSTOLIC BLOOD PRESSURE: 159 MMHG

## 2019-11-25 PROCEDURE — 99203 OFFICE O/P NEW LOW 30 MIN: CPT

## 2019-11-26 ENCOUNTER — APPOINTMENT (OUTPATIENT)
Dept: ULTRASOUND IMAGING | Facility: IMAGING CENTER | Age: 74
End: 2019-11-26
Payer: COMMERCIAL

## 2019-11-26 ENCOUNTER — OUTPATIENT (OUTPATIENT)
Dept: OUTPATIENT SERVICES | Facility: HOSPITAL | Age: 74
LOS: 1 days | End: 2019-11-26
Payer: COMMERCIAL

## 2019-11-26 DIAGNOSIS — Z96.643 PRESENCE OF ARTIFICIAL HIP JOINT, BILATERAL: Chronic | ICD-10-CM

## 2019-11-26 DIAGNOSIS — N18.3 CHRONIC KIDNEY DISEASE, STAGE 3 (MODERATE): ICD-10-CM

## 2019-11-26 PROCEDURE — 76770 US EXAM ABDO BACK WALL COMP: CPT | Mod: 26

## 2019-11-26 PROCEDURE — 76770 US EXAM ABDO BACK WALL COMP: CPT

## 2019-12-02 ENCOUNTER — APPOINTMENT (OUTPATIENT)
Dept: UROLOGY | Facility: CLINIC | Age: 74
End: 2019-12-02

## 2019-12-02 LAB
ALBUMIN SERPL ELPH-MCNC: 4.4 G/DL
ANA SER IF-ACNC: NEGATIVE
ANION GAP SERPL CALC-SCNC: 12 MMOL/L
APPEARANCE: CLEAR
BACTERIA: NEGATIVE
BASOPHILS # BLD AUTO: 0.03 K/UL
BASOPHILS NFR BLD AUTO: 0.6 %
BILIRUBIN URINE: NEGATIVE
BLOOD URINE: NEGATIVE
BUN SERPL-MCNC: 26 MG/DL
C3 SERPL-MCNC: 128 MG/DL
CALCIUM SERPL-MCNC: 9.4 MG/DL
CHLORIDE SERPL-SCNC: 104 MMOL/L
CK SERPL-CCNC: 122 U/L
CO2 SERPL-SCNC: 23 MMOL/L
COLOR: NORMAL
CREAT SERPL-MCNC: 1.55 MG/DL
CREAT SPEC-SCNC: 131 MG/DL
CREAT/PROT UR: 0.1 RATIO
DEPRECATED KAPPA LC FREE/LAMBDA SER: 0.94 RATIO
DSDNA AB SER-ACNC: <12 IU/ML
EOSINOPHIL # BLD AUTO: 0.13 K/UL
EOSINOPHIL NFR BLD AUTO: 2.4 %
GLUCOSE QUALITATIVE U: NEGATIVE
GLUCOSE SERPL-MCNC: 117 MG/DL
HBV SURFACE AB SER QL: NONREACTIVE
HBV SURFACE AG SER QL: NONREACTIVE
HCT VFR BLD CALC: 39.5 %
HCV AB SER QL: NONREACTIVE
HCV S/CO RATIO: 0.18 S/CO
HGB BLD-MCNC: 12.9 G/DL
HYALINE CASTS: 0 /LPF
IGA SER QL IEP: 286 MG/DL
IGG SER QL IEP: 1182 MG/DL
IGM SER QL IEP: 65 MG/DL
IMM GRANULOCYTES NFR BLD AUTO: 0.4 %
KAPPA LC CSF-MCNC: 2.91 MG/DL
KAPPA LC SERPL-MCNC: 2.73 MG/DL
KETONES URINE: NEGATIVE
LEUKOCYTE ESTERASE URINE: NEGATIVE
LYMPHOCYTES # BLD AUTO: 0.86 K/UL
LYMPHOCYTES NFR BLD AUTO: 16.1 %
M PROTEIN SPEC IFE-MCNC: NORMAL
MAN DIFF?: NORMAL
MCHC RBC-ENTMCNC: 30.4 PG
MCHC RBC-ENTMCNC: 32.7 GM/DL
MCV RBC AUTO: 93.2 FL
MICROSCOPIC-UA: NORMAL
MONOCYTES # BLD AUTO: 0.44 K/UL
MONOCYTES NFR BLD AUTO: 8.3 %
MPO AB + PR3 PNL SER: NORMAL
NEUTROPHILS # BLD AUTO: 3.85 K/UL
NEUTROPHILS NFR BLD AUTO: 72.2 %
NITRITE URINE: NEGATIVE
PH URINE: 5.5
PHOSPHATE SERPL-MCNC: 3.7 MG/DL
PLATELET # BLD AUTO: 179 K/UL
POTASSIUM SERPL-SCNC: 4.7 MMOL/L
PROT UR-MCNC: 9 MG/DL
PROTEIN URINE: NEGATIVE
RBC # BLD: 4.24 M/UL
RBC # FLD: 14.4 %
RED BLOOD CELLS URINE: 0 /HPF
SODIUM SERPL-SCNC: 139 MMOL/L
SPECIFIC GRAVITY URINE: 1.02
SQUAMOUS EPITHELIAL CELLS: 0 /HPF
UROBILINOGEN URINE: NORMAL
WBC # FLD AUTO: 5.33 K/UL
WHITE BLOOD CELLS URINE: 0 /HPF
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
## 2019-12-02 NOTE — PHYSICAL EXAM
[General Appearance - Alert] : alert [Sclera] : the sclera and conjunctiva were normal [General Appearance - Well Nourished] : well nourished [General Appearance - In No Acute Distress] : in no acute distress [Outer Ear] : the ears and nose were normal in appearance [Both Tympanic Membranes Were Examined] : both tympanic membranes were normal [PERRL With Normal Accommodation] : pupils were equal in size, round, and reactive to light [Neck Appearance] : the appearance of the neck was normal [Exaggerated Use Of Accessory Muscles For Inspiration] : no accessory muscle use [Respiration, Rhythm And Depth] : normal respiratory rhythm and effort [] : no respiratory distress [Apical Impulse] : the apical impulse was normal [Heart Rate And Rhythm] : heart rate was normal and rhythm regular [Heart Sounds] : normal S1 and S2 [Bowel Sounds] : normal bowel sounds [Edema] : there was no peripheral edema [Abdomen Soft] : soft [Abdomen Tenderness] : non-tender [Cervical Lymph Nodes Enlarged Posterior Bilaterally] : posterior cervical [Cervical Lymph Nodes Enlarged Anterior Bilaterally] : anterior cervical [Supraclavicular Lymph Nodes Enlarged Bilaterally] : supraclavicular [No Spinal Tenderness] : no spinal tenderness [Abnormal Walk] : normal gait [Musculoskeletal - Swelling] : no joint swelling seen [Oriented To Time, Place, And Person] : oriented to person, place, and time [Skin Color & Pigmentation] : normal skin color and pigmentation [FreeTextEntry1] : Backache
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
## 2019-12-02 NOTE — REVIEW OF SYSTEMS
[Eyesight Problems] : eyesight problems [Loss Of Hearing] : hearing loss [Lower Ext Edema] : lower extremity edema [SOB on Exertion] : shortness of breath during exertion [Nocturia] : nocturia [Arthralgias] : arthralgias [Joint Pain] : joint pain [Dizziness] : dizziness [Easy Bleeding] : a tendency for easy bleeding [Recent Weight Gain (___ Lbs)] : no recent weight gain [Chest Pain] : no chest pain [Recent Weight Loss (___ Lbs)] : no recent weight loss [Palpitations] : no palpitations [Constipation] : no constipation [Heartburn] : no heartburn [Diarrhea] : no diarrhea [Itching] : no itching [Fainting] : no fainting [Depression] : no depression [Anxiety] : no anxiety [FreeTextEntry9] : shoulder [Muscle Weakness] : no muscle weakness

## 2019-12-04 ENCOUNTER — INBOUND DOCUMENT (OUTPATIENT)
Age: 74
End: 2019-12-04

## 2019-12-30 NOTE — ED ADULT NURSE NOTE - OBJECTIVE STATEMENT
73M comes to ED c/o cough, fever, body aches, chills, nausea and urinary burning. Patient recently returned from [place]. He went to his PMD and was diagnosed with flu A. States he has had symptoms x6 days. He is a&ox3 and in no acute distress. Denies SOB/chest pain/chills/abdominal pain/diarrhea. He has had decreased PO intake. Soft abdomen on exam, clear lungs, no edema, no rashes noted, moves all extremities. He has PMH high cholesterol and HTN. Family at bedside. Will continue to monitor. Negative

## 2020-01-06 ENCOUNTER — APPOINTMENT (OUTPATIENT)
Dept: ORTHOPEDIC SURGERY | Facility: CLINIC | Age: 75
End: 2020-01-06
Payer: COMMERCIAL

## 2020-01-06 VITALS — HEIGHT: 68 IN | BODY MASS INDEX: 30.46 KG/M2 | WEIGHT: 201 LBS

## 2020-01-06 DIAGNOSIS — M48.02 SPINAL STENOSIS, CERVICAL REGION: ICD-10-CM

## 2020-01-06 DIAGNOSIS — M54.12 RADICULOPATHY, CERVICAL REGION: ICD-10-CM

## 2020-01-06 DIAGNOSIS — M47.812 SPONDYLOSIS W/OUT MYELOPATHY OR RADICULOPATHY, CERVICAL REGION: ICD-10-CM

## 2020-01-06 PROCEDURE — 99214 OFFICE O/P EST MOD 30 MIN: CPT

## 2020-02-18 ENCOUNTER — RX RENEWAL (OUTPATIENT)
Age: 75
End: 2020-02-18

## 2020-03-02 DIAGNOSIS — R21 RASH AND OTHER NONSPECIFIC SKIN ERUPTION: ICD-10-CM

## 2020-03-08 NOTE — CONSULT NOTE ADULT - PROVIDER SPECIALTY LIST ADULT
Patient is complaining of anxiety at this time. Stating that they feel restless and are having trouble going back to sleep after having a nightmare and claming down in order to rest this evening. Medication was given as prescribed for increased anxiety. Vascular Surgery

## 2020-03-23 ENCOUNTER — APPOINTMENT (OUTPATIENT)
Dept: ORTHOPEDIC SURGERY | Facility: CLINIC | Age: 75
End: 2020-03-23

## 2020-07-01 ENCOUNTER — RX RENEWAL (OUTPATIENT)
Age: 75
End: 2020-07-01

## 2020-09-14 ENCOUNTER — APPOINTMENT (OUTPATIENT)
Dept: OPHTHALMOLOGY | Facility: CLINIC | Age: 75
End: 2020-09-14

## 2020-09-25 ENCOUNTER — APPOINTMENT (OUTPATIENT)
Dept: INTERNAL MEDICINE | Facility: CLINIC | Age: 75
End: 2020-09-25
Payer: MEDICARE

## 2020-09-25 ENCOUNTER — LABORATORY RESULT (OUTPATIENT)
Age: 75
End: 2020-09-25

## 2020-09-25 VITALS
OXYGEN SATURATION: 98 % | HEIGHT: 68 IN | BODY MASS INDEX: 30.31 KG/M2 | WEIGHT: 200 LBS | DIASTOLIC BLOOD PRESSURE: 90 MMHG | SYSTOLIC BLOOD PRESSURE: 125 MMHG | TEMPERATURE: 97.8 F | HEART RATE: 67 BPM

## 2020-09-25 DIAGNOSIS — Z87.09 PERSONAL HISTORY OF OTHER DISEASES OF THE RESPIRATORY SYSTEM: ICD-10-CM

## 2020-09-25 DIAGNOSIS — R68.89 OTHER GENERAL SYMPTOMS AND SIGNS: ICD-10-CM

## 2020-09-25 DIAGNOSIS — Z87.440 PERSONAL HISTORY OF URINARY (TRACT) INFECTIONS: ICD-10-CM

## 2020-09-25 DIAGNOSIS — N17.9 ACUTE KIDNEY FAILURE, UNSPECIFIED: ICD-10-CM

## 2020-09-25 DIAGNOSIS — Z87.898 PERSONAL HISTORY OF OTHER SPECIFIED CONDITIONS: ICD-10-CM

## 2020-09-25 PROCEDURE — 90686 IIV4 VACC NO PRSV 0.5 ML IM: CPT

## 2020-09-25 PROCEDURE — G0008: CPT

## 2020-09-25 PROCEDURE — 36415 COLL VENOUS BLD VENIPUNCTURE: CPT

## 2020-09-25 PROCEDURE — 99214 OFFICE O/P EST MOD 30 MIN: CPT | Mod: 25

## 2020-09-25 NOTE — ASSESSMENT
[FreeTextEntry1] : Flu shot today. Outpatient and hospital records to be faxed over. \par \par Follow up BP < 1 month. \par \par Shingrix vaccine in next appointment. \par \par Derm, uro and nephro referral provided.

## 2020-09-25 NOTE — REVIEW OF SYSTEMS
[Fever] : no fever [Chills] : no chills [Chest Pain] : no chest pain [Palpitations] : no palpitations [Lower Ext Edema] : no lower extremity edema [Shortness Of Breath] : no shortness of breath [Wheezing] : no wheezing [Cough] : no cough [Dyspnea on Exertion] : not dyspnea on exertion [Nausea] : no nausea [Constipation] : no constipation [Diarrhea] : no diarrhea [Vomiting] : no vomiting [Dysuria] : no dysuria [Hesitancy] : no hesitancy [Hematuria] : no hematuria [Joint Pain] : no joint pain [Back Pain] : no back pain [Skin Rash] : no skin rash [Headache] : no headache [Fainting] : no fainting

## 2020-09-25 NOTE — HISTORY OF PRESENT ILLNESS
PSYCHIATRIC CONSULTATION    The medicine service requested a consultation on this patient with dementia, agitated behaviors and depression.    The patient is a 60-year-old female, who was diagnosed with early onset rapidly progressive Alzheimer's disease.  She has been having continued decline, episodes of agitation, depression, irritability.  She was seen by Neurology on 07/06/2017, they recommended continuing her Namenda, stopping Aricept, stopping Ativan and starting Seroquel.  There was some question of whether she was orthostatic, possibly secondary to hypotension versus the effects of the Seroquel.  Currently, her behavior is characterized mainly by some confusion, irritability and agitation and depression.     On my interview, the patient was lying in bed.  She was quite confused.  She did not know where she was.  She thought the date was 06/2017.  She had pronounced hesitancy of speech and word finding problems.  She had rambling tangential circumstantial speech.  She was not overtly psychotic, went on though to tell me a story about a little boy that was living in her home.    My discussion with her nurse, this has been her baseline behavior since she has been at the hospital.    Past Medical History:  Significant for spinal puncture headache, Alzheimer's disease.    Past Psychiatric History:  Alzheimer's disease.    Chemical Dependency History:  Negative.    Social History:  The patient is , lives with her .    Family History:  Significant for diabetes, hypertension and thyroid disease.    Review of systems:  Ten point review of systems was attempted but the patient is unable to respond in any meaningful way.  She has no clear physical complaints at this time, although she is quite confused and a very poor historian.      Medications:  Medications are reviewed per Epic.  She is currently on Seroquel for her agitation.    VITAL SIGNS:  Blood pressure 122/48, pulse 70, temperature 97.4,  "respirations 17.    Mental Status Exam:  The patient is a middle-aged woman lying in bed.  She is not particularly agitated at this time.  She denies being depressed.  She is a poor historian.  She attempts to be cooperative but cannot remember things.  Her mood is described as \"okay\" at this time.  Her affect is mobile.  Her thought processes are significant for tangential circumstantial speech.  There is no clear loosening of association, thought content.  The patient appeared to be telling some story about a child but it is unclear what this was about.  There was no overt psychosis.  She is not complaining of any auditory or visual hallucinations.  Speech is normal rate but significant for word finding and halting speech, use of language as appropriate.  Attention and concentration are impaired.  Orientation:  The patient thought it was 06/2017.  She could not tell me what type of place she was in.  Recent and remote memory are impaired.  Fund of knowledge impaired.  Judgment and insight impaired.  Muscle bulk and tone appears normal.  Abnormal movement:  There are none noted.    Impression:  The patient is a 60-year-old woman with early onset progressive Alzheimer's disease with behavioral manifestations.  She has some irritability, agitation, depression, emotional lability at times.  There was some concern in the past about possible orthostasis leading to gait instability and fall.      DSM Diagnosis:  Major neurocognitive disorder Alzheimer's disease.    Treatment Recommendations:    1.  Could consider replacing Seroquel with low dose Zyprexa if orthostatic sedation and falls are a consideration.  Will start off with 1.25 mg nightly and q.4 to 6 hours and adjust as indicated.   2.  The patient does have some mood symptoms.  A trial of Zoloft may be helpful for depression and irritability, would initially start at 12.5 to 25 mg nightly, titrate up by about 12.5 to 25 per day every 2 to 3 days to an initial dose " of 50 mg and monitor.  This can go up gradually on this as tolerated but would give it some time in the 50 mg range.  3.  Please call or reconsult if any questions, concerns or change in the patient's status.  Cased was discussed with Dr. Thurman.        Tony Saldana MD    D:  07/12/2017 16:08 T:  07/18/2017 13:22  Document:  [id number]\[id number]   [FreeTextEntry8] : Mr. ELLIOTT CANCINO is a 75 year old man with pmhx of HTN, CKD, AAA w/ leak repair who presents for an acute visit. He has been taking Losartan 50mg daily, Labetolol 100mg daily for blood pressure control.  Past use of spironolactone, self dc. Yesterday he had high SBP 180s blood pressure prior to taking his evening losartan dose. He was having headaches at the time. This morning he woke up with high blood pressure SBP 180s  and took clonidine. He follows in [place] with Dr. Robles who had prescribed these .

## 2020-09-28 LAB
25(OH)D3 SERPL-MCNC: 36.2 NG/ML
ALBUMIN SERPL ELPH-MCNC: 4.4 G/DL
ALP BLD-CCNC: 78 U/L
ALT SERPL-CCNC: 17 U/L
ANION GAP SERPL CALC-SCNC: 16 MMOL/L
APPEARANCE: CLEAR
AST SERPL-CCNC: 18 U/L
BACTERIA: NEGATIVE
BASOPHILS # BLD AUTO: 0.06 K/UL
BASOPHILS NFR BLD AUTO: 0.9 %
BILIRUB SERPL-MCNC: 0.5 MG/DL
BILIRUBIN URINE: NEGATIVE
BLOOD URINE: NEGATIVE
BUN SERPL-MCNC: 24 MG/DL
CALCIUM SERPL-MCNC: 9.6 MG/DL
CHLORIDE SERPL-SCNC: 105 MMOL/L
CHOLEST SERPL-MCNC: 156 MG/DL
CHOLEST/HDLC SERPL: 4 RATIO
CK SERPL-CCNC: 74 U/L
CO2 SERPL-SCNC: 21 MMOL/L
COLOR: YELLOW
CREAT SERPL-MCNC: 1.54 MG/DL
CREAT SPEC-SCNC: 166 MG/DL
CREAT/PROT UR: 0.1 RATIO
EOSINOPHIL # BLD AUTO: 0.44 K/UL
EOSINOPHIL NFR BLD AUTO: 6.3 %
ESTIMATED AVERAGE GLUCOSE: 117 MG/DL
FOLATE SERPL-MCNC: 14.8 NG/ML
GLUCOSE QUALITATIVE U: NEGATIVE
GLUCOSE SERPL-MCNC: 104 MG/DL
HBA1C MFR BLD HPLC: 5.7 %
HCT VFR BLD CALC: 42.3 %
HDLC SERPL-MCNC: 39 MG/DL
HGB BLD-MCNC: 13.2 G/DL
HYALINE CASTS: 0 /LPF
IMM GRANULOCYTES NFR BLD AUTO: 0.3 %
KETONES URINE: NEGATIVE
LDLC SERPL CALC-MCNC: 92 MG/DL
LEUKOCYTE ESTERASE URINE: NEGATIVE
LYMPHOCYTES # BLD AUTO: 1.13 K/UL
LYMPHOCYTES NFR BLD AUTO: 16.1 %
MAN DIFF?: NORMAL
MCHC RBC-ENTMCNC: 29.9 PG
MCHC RBC-ENTMCNC: 31.2 GM/DL
MCV RBC AUTO: 95.9 FL
MICROSCOPIC-UA: NORMAL
MONOCYTES # BLD AUTO: 0.58 K/UL
MONOCYTES NFR BLD AUTO: 8.3 %
NEUTROPHILS # BLD AUTO: 4.79 K/UL
NEUTROPHILS NFR BLD AUTO: 68.1 %
NITRITE URINE: NEGATIVE
PH URINE: 6
PLATELET # BLD AUTO: 178 K/UL
POTASSIUM SERPL-SCNC: 5.1 MMOL/L
PROT SERPL-MCNC: 6.9 G/DL
PROT UR-MCNC: 13 MG/DL
PROTEIN URINE: NORMAL
PSA SERPL-MCNC: 2.72 NG/ML
RBC # BLD: 4.41 M/UL
RBC # FLD: 14.6 %
RED BLOOD CELLS URINE: 5 /HPF
SODIUM SERPL-SCNC: 142 MMOL/L
SPECIFIC GRAVITY URINE: 1.02
SQUAMOUS EPITHELIAL CELLS: 1 /HPF
TRIGL SERPL-MCNC: 124 MG/DL
TSH SERPL-ACNC: 4.81 UIU/ML
UROBILINOGEN URINE: NORMAL
VIT B12 SERPL-MCNC: 1146 PG/ML
WBC # FLD AUTO: 7.02 K/UL
WHITE BLOOD CELLS URINE: 1 /HPF

## 2020-10-05 ENCOUNTER — APPOINTMENT (OUTPATIENT)
Dept: NEPHROLOGY | Facility: CLINIC | Age: 75
End: 2020-10-05
Payer: MEDICARE

## 2020-10-05 VITALS
BODY MASS INDEX: 30.11 KG/M2 | OXYGEN SATURATION: 97 % | DIASTOLIC BLOOD PRESSURE: 94 MMHG | SYSTOLIC BLOOD PRESSURE: 159 MMHG | HEART RATE: 63 BPM | WEIGHT: 198 LBS

## 2020-10-05 DIAGNOSIS — R35.0 FREQUENCY OF MICTURITION: ICD-10-CM

## 2020-10-05 PROCEDURE — 99214 OFFICE O/P EST MOD 30 MIN: CPT

## 2020-10-05 RX ORDER — LABETALOL HYDROCHLORIDE 100 MG/1
100 TABLET, FILM COATED ORAL
Qty: 60 | Refills: 1 | Status: DISCONTINUED | COMMUNITY
Start: 2019-11-19 | End: 2020-10-05

## 2020-10-05 NOTE — ASSESSMENT
[FreeTextEntry1] : A case of CKD with hypertension, hyperlipidemia complains of uncontrolled hypertension and increased frequency of urination. Patient had blood tests last week. Renal function stable. BP is uncontrolled because he is taking medications which work for 12  hours. Advised to d/c losartan and Labetalol. Advsed to start Valsartan HCTZ, metoprolol and doxazosine.

## 2020-10-05 NOTE — PHYSICAL EXAM
[General Appearance - Alert] : alert [General Appearance - In No Acute Distress] : in no acute distress [General Appearance - Well Nourished] : well nourished [Sclera] : the sclera and conjunctiva were normal [PERRL With Normal Accommodation] : pupils were equal in size, round, and reactive to light [Outer Ear] : the ears and nose were normal in appearance [Both Tympanic Membranes Were Examined] : both tympanic membranes were normal [Neck Appearance] : the appearance of the neck was normal [] : no respiratory distress [Respiration, Rhythm And Depth] : normal respiratory rhythm and effort [Exaggerated Use Of Accessory Muscles For Inspiration] : no accessory muscle use [Apical Impulse] : the apical impulse was normal [Heart Rate And Rhythm] : heart rate was normal and rhythm regular [Heart Sounds] : normal S1 and S2 [Edema] : there was no peripheral edema [Bowel Sounds] : normal bowel sounds [Abdomen Soft] : soft [Abdomen Tenderness] : non-tender [Cervical Lymph Nodes Enlarged Posterior Bilaterally] : posterior cervical [Cervical Lymph Nodes Enlarged Anterior Bilaterally] : anterior cervical [Supraclavicular Lymph Nodes Enlarged Bilaterally] : supraclavicular [No CVA Tenderness] : no ~M costovertebral angle tenderness [No Spinal Tenderness] : no spinal tenderness [Abnormal Walk] : normal gait [Musculoskeletal - Swelling] : no joint swelling seen [FreeTextEntry1] : Backache [Skin Color & Pigmentation] : normal skin color and pigmentation [Oriented To Time, Place, And Person] : oriented to person, place, and time

## 2020-10-05 NOTE — REVIEW OF SYSTEMS
[Recent Weight Gain (___ Lbs)] : no recent weight gain [Recent Weight Loss (___ Lbs)] : no recent weight loss [Eyesight Problems] : eyesight problems [Loss Of Hearing] : hearing loss [Chest Pain] : no chest pain [Palpitations] : no palpitations [Lower Ext Edema] : lower extremity edema [SOB on Exertion] : shortness of breath during exertion [Constipation] : no constipation [Diarrhea] : no diarrhea [Heartburn] : no heartburn [Nocturia] : nocturia [Arthralgias] : arthralgias [Joint Pain] : joint pain [Itching] : no itching [Dizziness] : dizziness [Fainting] : no fainting [Anxiety] : no anxiety [Depression] : no depression [Muscle Weakness] : no muscle weakness [Easy Bleeding] : a tendency for easy bleeding [FreeTextEntry9] : shoulder

## 2020-10-05 NOTE — HISTORY OF PRESENT ILLNESS
[FreeTextEntry1] : A case of SHANA on CKD with hypertension, hyperlipidemia, recently had undergone aortic stent placement and repair right femoral vein for better flow. Patient complains of uncontrolled BP.  Patient also complains of increased frequency in the night.

## 2020-10-09 ENCOUNTER — APPOINTMENT (OUTPATIENT)
Dept: INTERNAL MEDICINE | Facility: CLINIC | Age: 75
End: 2020-10-09
Payer: MEDICARE

## 2020-10-09 ENCOUNTER — MED ADMIN CHARGE (OUTPATIENT)
Age: 75
End: 2020-10-09

## 2020-10-09 VITALS
BODY MASS INDEX: 30.62 KG/M2 | SYSTOLIC BLOOD PRESSURE: 124 MMHG | OXYGEN SATURATION: 97 % | HEIGHT: 68 IN | HEART RATE: 70 BPM | TEMPERATURE: 96.1 F | WEIGHT: 202 LBS | DIASTOLIC BLOOD PRESSURE: 68 MMHG

## 2020-10-09 DIAGNOSIS — Z23 ENCOUNTER FOR IMMUNIZATION: ICD-10-CM

## 2020-10-09 DIAGNOSIS — Z12.83 ENCOUNTER FOR SCREENING FOR MALIGNANT NEOPLASM OF SKIN: ICD-10-CM

## 2020-10-09 DIAGNOSIS — R91.1 SOLITARY PULMONARY NODULE: ICD-10-CM

## 2020-10-09 DIAGNOSIS — S39.012A STRAIN OF MUSCLE, FASCIA AND TENDON OF LOWER BACK, INITIAL ENCOUNTER: ICD-10-CM

## 2020-10-09 DIAGNOSIS — N18.9 CHRONIC KIDNEY DISEASE, UNSPECIFIED: ICD-10-CM

## 2020-10-09 DIAGNOSIS — N18.32 CHRONIC KIDNEY DISEASE, STAGE 3B: ICD-10-CM

## 2020-10-09 DIAGNOSIS — T79.6XXA TRAUMATIC ISCHEMIA OF MUSCLE, INITIAL ENCOUNTER: ICD-10-CM

## 2020-10-09 DIAGNOSIS — B36.0 PITYRIASIS VERSICOLOR: ICD-10-CM

## 2020-10-09 DIAGNOSIS — Z87.09 PERSONAL HISTORY OF OTHER DISEASES OF THE RESPIRATORY SYSTEM: ICD-10-CM

## 2020-10-09 PROCEDURE — G0009: CPT

## 2020-10-09 PROCEDURE — 90732 PPSV23 VACC 2 YRS+ SUBQ/IM: CPT

## 2020-10-09 PROCEDURE — 90750 HZV VACC RECOMBINANT IM: CPT

## 2020-10-09 PROCEDURE — 90472 IMMUNIZATION ADMIN EACH ADD: CPT

## 2020-10-09 PROCEDURE — 36415 COLL VENOUS BLD VENIPUNCTURE: CPT

## 2020-10-09 PROCEDURE — G0439: CPT

## 2020-10-09 PROCEDURE — G0444 DEPRESSION SCREEN ANNUAL: CPT

## 2020-10-09 NOTE — HEALTH RISK ASSESSMENT
[Very Good] : ~his/her~  mood as very good [No] : In the past 12 months have you used drugs other than those required for medical reasons? 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

## 2020-10-09 NOTE — PHYSICAL EXAM
[No Acute Distress] : no acute distress [Well Nourished] : well nourished [Well Developed] : well developed [Normal Sclera/Conjunctiva] : normal sclera/conjunctiva [PERRL] : pupils equal round and reactive to light [EOMI] : extraocular movements intact [Normal Outer Ear/Nose] : the outer ears and nose were normal in appearance [Normal Oropharynx] : the oropharynx was normal [No JVD] : no jugular venous distention [No Lymphadenopathy] : no lymphadenopathy [Supple] : supple [Thyroid Normal, No Nodules] : the thyroid was normal and there were no nodules present [No Respiratory Distress] : no respiratory distress  [No Accessory Muscle Use] : no accessory muscle use [Clear to Auscultation] : lungs were clear to auscultation bilaterally [Normal Rate] : normal rate  [Regular Rhythm] : with a regular rhythm [Normal S1, S2] : normal S1 and S2 [No Murmur] : no murmur heard [Pedal Pulses Present] : the pedal pulses are present [No Edema] : there was no peripheral edema [No Palpable Aorta] : no palpable aorta [No Extremity Clubbing/Cyanosis] : no extremity clubbing/cyanosis [Soft] : abdomen soft [Non Tender] : non-tender [Non-distended] : non-distended [No Masses] : no abdominal mass palpated [Normal Bowel Sounds] : normal bowel sounds [Normal Supraclavicular Nodes] : no supraclavicular lymphadenopathy [Normal Posterior Cervical Nodes] : no posterior cervical lymphadenopathy [Normal Anterior Cervical Nodes] : no anterior cervical lymphadenopathy [No CVA Tenderness] : no CVA  tenderness [No Spinal Tenderness] : no spinal tenderness [No Joint Swelling] : no joint swelling [Grossly Normal Strength/Tone] : grossly normal strength/tone [No Rash] : no rash [Coordination Grossly Intact] : coordination grossly intact [No Focal Deficits] : no focal deficits [Normal Gait] : normal gait [Normal Affect] : the affect was normal [Normal Insight/Judgement] : insight and judgment were intact [Comprehensive Foot Exam Normal] : Right and left foot were examined and both feet are normal. No ulcers in either foot. Toes are normal and with full ROM.  Normal tactile sensation with monofilament testing throughout both feet

## 2020-10-09 NOTE — HISTORY OF PRESENT ILLNESS
[FreeTextEntry1] : Physical [de-identified] : Mr. ELLIOTT CANCINO is a 75 year old man with pmhx of HTN, HLD, CKD, AAA w/ leak repair s/p repair, hx of lung nodules,  hx of kidney stones who presents for a physical. He recently saw nephrology nephrology started on new bp medications. He follows closely with [facility] vascular surgeon for AAA management. He states he has been doing well on new medications, going to the bathroom once a night.

## 2020-10-09 NOTE — ASSESSMENT
[FreeTextEntry1] : #HCM Encouraged healthy meals and snacks, reduced food intake, and increased physical activity as tolerated for weight reduction/maintenance. Pneumovax and shingrix today.  Lab ordered as below.  Referral provided as below.  HCP form to be faxed by patient. Colonoscopy utd. \par \par BP check in 6 months

## 2020-10-09 NOTE — REVIEW OF SYSTEMS
[Nocturia] : nocturia [Negative] : Heme/Lymph [Fever] : no fever [Chills] : no chills [Fatigue] : no fatigue [Night Sweats] : no night sweats [Chest Pain] : no chest pain [Abdominal Pain] : no abdominal pain [Nausea] : no nausea [Dysuria] : no dysuria [Incontinence] : no incontinence [Hesitancy] : no hesitancy [Hematuria] : no hematuria [Frequency] : no frequency

## 2020-11-07 ENCOUNTER — RX RENEWAL (OUTPATIENT)
Age: 75
End: 2020-11-07

## 2020-12-18 ENCOUNTER — RX RENEWAL (OUTPATIENT)
Age: 75
End: 2020-12-18

## 2020-12-27 ENCOUNTER — EMERGENCY (EMERGENCY)
Facility: HOSPITAL | Age: 75
LOS: 1 days | Discharge: HOME | End: 2020-12-27
Attending: EMERGENCY MEDICINE
Payer: MEDICARE

## 2020-12-27 VITALS
HEART RATE: 63 BPM | OXYGEN SATURATION: 98 % | SYSTOLIC BLOOD PRESSURE: 122 MMHG | TEMPERATURE: 98 F | RESPIRATION RATE: 18 BRPM | DIASTOLIC BLOOD PRESSURE: 81 MMHG

## 2020-12-27 VITALS
HEART RATE: 69 BPM | WEIGHT: 199.08 LBS | OXYGEN SATURATION: 99 % | DIASTOLIC BLOOD PRESSURE: 111 MMHG | TEMPERATURE: 98 F | SYSTOLIC BLOOD PRESSURE: 180 MMHG | RESPIRATION RATE: 20 BRPM | HEIGHT: 66 IN

## 2020-12-27 DIAGNOSIS — Z96.643 PRESENCE OF ARTIFICIAL HIP JOINT, BILATERAL: Chronic | ICD-10-CM

## 2020-12-27 LAB
ALBUMIN SERPL ELPH-MCNC: 3.7 G/DL — SIGNIFICANT CHANGE UP (ref 3.3–5)
ALBUMIN SERPL ELPH-MCNC: 4 G/DL — SIGNIFICANT CHANGE UP (ref 3.3–5)
ALP SERPL-CCNC: 62 U/L — SIGNIFICANT CHANGE UP (ref 40–120)
ALP SERPL-CCNC: 66 U/L — SIGNIFICANT CHANGE UP (ref 40–120)
ALT FLD-CCNC: 11 U/L — SIGNIFICANT CHANGE UP (ref 10–45)
ALT FLD-CCNC: 13 U/L — SIGNIFICANT CHANGE UP (ref 10–45)
ANION GAP SERPL CALC-SCNC: 10 MMOL/L — SIGNIFICANT CHANGE UP (ref 5–17)
ANION GAP SERPL CALC-SCNC: 13 MMOL/L — SIGNIFICANT CHANGE UP (ref 5–17)
AST SERPL-CCNC: 12 U/L — SIGNIFICANT CHANGE UP (ref 10–40)
AST SERPL-CCNC: 15 U/L — SIGNIFICANT CHANGE UP (ref 10–40)
BASOPHILS # BLD AUTO: 0.03 K/UL — SIGNIFICANT CHANGE UP (ref 0–0.2)
BASOPHILS NFR BLD AUTO: 0.3 % — SIGNIFICANT CHANGE UP (ref 0–2)
BILIRUB SERPL-MCNC: 0.5 MG/DL — SIGNIFICANT CHANGE UP (ref 0.2–1.2)
BILIRUB SERPL-MCNC: 0.5 MG/DL — SIGNIFICANT CHANGE UP (ref 0.2–1.2)
BUN SERPL-MCNC: 31 MG/DL — HIGH (ref 7–23)
BUN SERPL-MCNC: 31 MG/DL — HIGH (ref 7–23)
CALCIUM SERPL-MCNC: 8.3 MG/DL — LOW (ref 8.4–10.5)
CALCIUM SERPL-MCNC: 9.1 MG/DL — SIGNIFICANT CHANGE UP (ref 8.4–10.5)
CHLORIDE SERPL-SCNC: 100 MMOL/L — SIGNIFICANT CHANGE UP (ref 96–108)
CHLORIDE SERPL-SCNC: 104 MMOL/L — SIGNIFICANT CHANGE UP (ref 96–108)
CO2 SERPL-SCNC: 22 MMOL/L — SIGNIFICANT CHANGE UP (ref 22–31)
CO2 SERPL-SCNC: 24 MMOL/L — SIGNIFICANT CHANGE UP (ref 22–31)
CREAT SERPL-MCNC: 1.33 MG/DL — HIGH (ref 0.5–1.3)
CREAT SERPL-MCNC: 1.49 MG/DL — HIGH (ref 0.5–1.3)
CRP SERPL-MCNC: 0.64 MG/DL — HIGH (ref 0–0.4)
EOSINOPHIL # BLD AUTO: 0.1 K/UL — SIGNIFICANT CHANGE UP (ref 0–0.5)
EOSINOPHIL NFR BLD AUTO: 1.1 % — SIGNIFICANT CHANGE UP (ref 0–6)
ERYTHROCYTE [SEDIMENTATION RATE] IN BLOOD: 6 MM/HR — SIGNIFICANT CHANGE UP (ref 0–20)
GLUCOSE SERPL-MCNC: 107 MG/DL — HIGH (ref 70–99)
GLUCOSE SERPL-MCNC: 171 MG/DL — HIGH (ref 70–99)
HCT VFR BLD CALC: 36.4 % — LOW (ref 39–50)
HGB BLD-MCNC: 11.8 G/DL — LOW (ref 13–17)
IMM GRANULOCYTES NFR BLD AUTO: 0.3 % — SIGNIFICANT CHANGE UP (ref 0–1.5)
LYMPHOCYTES # BLD AUTO: 0.67 K/UL — LOW (ref 1–3.3)
LYMPHOCYTES # BLD AUTO: 7.6 % — LOW (ref 13–44)
MCHC RBC-ENTMCNC: 30.4 PG — SIGNIFICANT CHANGE UP (ref 27–34)
MCHC RBC-ENTMCNC: 32.4 GM/DL — SIGNIFICANT CHANGE UP (ref 32–36)
MCV RBC AUTO: 93.8 FL — SIGNIFICANT CHANGE UP (ref 80–100)
MONOCYTES # BLD AUTO: 0.55 K/UL — SIGNIFICANT CHANGE UP (ref 0–0.9)
MONOCYTES NFR BLD AUTO: 6.2 % — SIGNIFICANT CHANGE UP (ref 2–14)
NEUTROPHILS # BLD AUTO: 7.49 K/UL — HIGH (ref 1.8–7.4)
NEUTROPHILS NFR BLD AUTO: 84.5 % — HIGH (ref 43–77)
NRBC # BLD: 0 /100 WBCS — SIGNIFICANT CHANGE UP (ref 0–0)
PLATELET # BLD AUTO: 123 K/UL — LOW (ref 150–400)
POTASSIUM SERPL-MCNC: 4.3 MMOL/L — SIGNIFICANT CHANGE UP (ref 3.5–5.3)
POTASSIUM SERPL-MCNC: 4.5 MMOL/L — SIGNIFICANT CHANGE UP (ref 3.5–5.3)
POTASSIUM SERPL-SCNC: 4.3 MMOL/L — SIGNIFICANT CHANGE UP (ref 3.5–5.3)
POTASSIUM SERPL-SCNC: 4.5 MMOL/L — SIGNIFICANT CHANGE UP (ref 3.5–5.3)
PROT SERPL-MCNC: 6.4 G/DL — SIGNIFICANT CHANGE UP (ref 6–8.3)
PROT SERPL-MCNC: 6.9 G/DL — SIGNIFICANT CHANGE UP (ref 6–8.3)
RBC # BLD: 3.88 M/UL — LOW (ref 4.2–5.8)
RBC # FLD: 13.9 % — SIGNIFICANT CHANGE UP (ref 10.3–14.5)
SODIUM SERPL-SCNC: 135 MMOL/L — SIGNIFICANT CHANGE UP (ref 135–145)
SODIUM SERPL-SCNC: 138 MMOL/L — SIGNIFICANT CHANGE UP (ref 135–145)
WBC # BLD: 8.87 K/UL — SIGNIFICANT CHANGE UP (ref 3.8–10.5)
WBC # FLD AUTO: 8.87 K/UL — SIGNIFICANT CHANGE UP (ref 3.8–10.5)

## 2020-12-27 PROCEDURE — 96374 THER/PROPH/DIAG INJ IV PUSH: CPT

## 2020-12-27 PROCEDURE — 80053 COMPREHEN METABOLIC PANEL: CPT

## 2020-12-27 PROCEDURE — 73130 X-RAY EXAM OF HAND: CPT

## 2020-12-27 PROCEDURE — 73110 X-RAY EXAM OF WRIST: CPT | Mod: 26,RT

## 2020-12-27 PROCEDURE — 85652 RBC SED RATE AUTOMATED: CPT

## 2020-12-27 PROCEDURE — 73130 X-RAY EXAM OF HAND: CPT | Mod: 26,RT

## 2020-12-27 PROCEDURE — 73090 X-RAY EXAM OF FOREARM: CPT | Mod: 26,RT

## 2020-12-27 PROCEDURE — 99284 EMERGENCY DEPT VISIT MOD MDM: CPT

## 2020-12-27 PROCEDURE — 99284 EMERGENCY DEPT VISIT MOD MDM: CPT | Mod: 25

## 2020-12-27 PROCEDURE — 73090 X-RAY EXAM OF FOREARM: CPT

## 2020-12-27 PROCEDURE — 86140 C-REACTIVE PROTEIN: CPT

## 2020-12-27 PROCEDURE — 73110 X-RAY EXAM OF WRIST: CPT

## 2020-12-27 PROCEDURE — 96375 TX/PRO/DX INJ NEW DRUG ADDON: CPT

## 2020-12-27 PROCEDURE — 85025 COMPLETE CBC W/AUTO DIFF WBC: CPT

## 2020-12-27 RX ORDER — KETOROLAC TROMETHAMINE 30 MG/ML
15 SYRINGE (ML) INJECTION ONCE
Refills: 0 | Status: DISCONTINUED | OUTPATIENT
Start: 2020-12-27 | End: 2020-12-27

## 2020-12-27 RX ORDER — ACETAMINOPHEN 500 MG
650 TABLET ORAL ONCE
Refills: 0 | Status: COMPLETED | OUTPATIENT
Start: 2020-12-27 | End: 2020-12-27

## 2020-12-27 RX ORDER — ONDANSETRON 8 MG/1
4 TABLET, FILM COATED ORAL ONCE
Refills: 0 | Status: COMPLETED | OUTPATIENT
Start: 2020-12-27 | End: 2020-12-27

## 2020-12-27 RX ORDER — SODIUM CHLORIDE 9 MG/ML
1000 INJECTION INTRAMUSCULAR; INTRAVENOUS; SUBCUTANEOUS ONCE
Refills: 0 | Status: COMPLETED | OUTPATIENT
Start: 2020-12-27 | End: 2020-12-27

## 2020-12-27 RX ORDER — MORPHINE SULFATE 50 MG/1
4 CAPSULE, EXTENDED RELEASE ORAL ONCE
Refills: 0 | Status: DISCONTINUED | OUTPATIENT
Start: 2020-12-27 | End: 2020-12-27

## 2020-12-27 RX ADMIN — Medication 650 MILLIGRAM(S): at 10:24

## 2020-12-27 RX ADMIN — ONDANSETRON 4 MILLIGRAM(S): 8 TABLET, FILM COATED ORAL at 09:36

## 2020-12-27 RX ADMIN — Medication 15 MILLIGRAM(S): at 11:38

## 2020-12-27 RX ADMIN — SODIUM CHLORIDE 1000 MILLILITER(S): 9 INJECTION INTRAMUSCULAR; INTRAVENOUS; SUBCUTANEOUS at 11:14

## 2020-12-27 RX ADMIN — MORPHINE SULFATE 4 MILLIGRAM(S): 50 CAPSULE, EXTENDED RELEASE ORAL at 09:25

## 2020-12-27 RX ADMIN — Medication 650 MILLIGRAM(S): at 09:25

## 2020-12-27 RX ADMIN — MORPHINE SULFATE 4 MILLIGRAM(S): 50 CAPSULE, EXTENDED RELEASE ORAL at 10:23

## 2020-12-27 NOTE — ED PROVIDER NOTE - CLINICAL SUMMARY MEDICAL DECISION MAKING FREE TEXT BOX
Red: adult male, retired , hx of right wrist arthritis, presents with diffuse wrist and hand pain after leaving [employer] to work a busy holiday weekend as a . Likely arthritis flair/overuse injurty. Plan: Pain management, XR, ace wrap, protection, elevation, ice, rest, compression therapy. Red: adult male, retired , hx of right wrist arthritis, presents with diffuse wrist and hand pain after leaving care home to work a busy holiday weekend as a . Likely arthritis flair/overuse injury. Plan: Pain management, XR, ace wrap, protection, elevation, ice, rest, compression therapy.

## 2020-12-27 NOTE — ED PROVIDER NOTE - NSFOLLOWUPINSTRUCTIONS_ED_ALL_ED_FT
You were seen for wrist pain.     Please take 600 mg of Ibuprofen (aka Motrin, Advil) and/or 650 mg Acetaminophen (aka Tylenol) every 6 hours, as needed, for mild-moderate pain.    Please do not take these medications if you do not have pain or if you have any history of bleeding disorders, kidney or liver disease.   Do not use ibuprofen if you are on blood thinners (anti-coagulation).     Protect your wrist.     Wrap your wrist in ace bandaged.     Use ice for 20 min at a time.     Elevate your hand.     Seek immediate medical assistance for any new or worsening symptoms such as fever chills vomiting worsening pain.

## 2020-12-27 NOTE — ED ADULT NURSE NOTE - NSIMPLEMENTINTERV_GEN_ALL_ED
Implemented All Universal Safety Interventions:  [place] to call system. Call bell, personal items and telephone within reach. Instruct patient to call for assistance. Room bathroom lighting operational. Non-slip footwear when patient is off stretcher. Physically safe environment: no spills, clutter or unnecessary equipment. Stretcher in lowest position, wheels locked, appropriate side rails in place.

## 2020-12-27 NOTE — ED PROVIDER NOTE - PATIENT PORTAL LINK FT
You can access the FollowMyHealth Patient Portal offered by [facility] by registering at the following website: [email or web address]. By joining [facility]’s FollowMyHealth portal, you will also be able to view your health information using other applications (apps) compatible with our system.

## 2020-12-27 NOTE — ED ADULT NURSE REASSESSMENT NOTE - NS ED NURSE REASSESS COMMENT FT1
pt medicated for pain control. pt became nauseas when receiving morphine. pt medicated with Zofran. MD Moon and MD Carballo at bedside for evaluation. blood work sent to lab. pending radiology exams. plan of care discussed. updated pt wife Shaneka via phone [phone] on pt plan of care. safety and comfort measures maintained.

## 2020-12-27 NOTE — ED PROVIDER NOTE - PHYSICAL EXAMINATION
Physical Exam:    I have reviewed the triage vital signs.    Gen: NAD, AOx3, non-toxic appearing, able to ambulate without assistance  Head and Neck: NCAT, Neck supple without meningismus   HEENT: EOMI, PEERLA, normal conjunctiva, tongue midline, oral mucosa moist  Lung: CTAB, no respiratory distress, no wheezes/rhonchi/rales B/L, speaking in full sentences  CV: RRR, no murmurs, rubs or gallops  Abd: soft, NT, ND, no guarding, no rigidity, no rebound tenderness, no CVA tenderness, no masses.   MSK: no gross deformities, no back tenderness. Right extremity: distal forearm/wrist/hand warm, exquisite ttp and ROM in all joints, no fusiform digit swelling, strong radial pulse, altered sensorium to painful area.   Neuro: CNs II-XII grossly intact. No focal sensory or motor deficits  Skin: Warm, well perfused, no rash, no leg swelling  Psych: Appropriate mood and affect

## 2020-12-27 NOTE — ED PROVIDER NOTE - OBJECTIVE STATEMENT
75M pmh abd aneurysm, right hand arthritis, b/l hip replacements, presents with severe right forearm/wrist/hand pain. Patient is a retired , but worked heavily this past weekend due to his son calling in sick. Patient right hand dominant. No fever/chills. No cp sob loc. No headache. Feels well other then extremity pain. Tried motrin last night.

## 2020-12-27 NOTE — ED ADULT NURSE NOTE - OBJECTIVE STATEMENT
74 yo presents to the ED from home. A&Ox4, ambulatory c/o R hand/arm pain. pt reports pain started last night at 1100. pt reports taking Tylenol at 0700. pt reports history of arthritis which causes some pain at R wrist but reports this pain is much worse. pt works as a  and was busy during the holidays recently. pt reports pain worse with moving fingers. site is not warm or red. minimal swelling noted. pt denies any cuts. pt denies any injury to site. no other joint pain. no history of gout. pt hypertensive, history of HTN, did not take meds this AM. very tender upon palpation. 74 yo presents to the ED from home. A&Ox4, ambulatory c/o R hand/arm pain. pt reports pain started last night at 1100. pt reports taking Tylenol at 0700. pt reports history of arthritis which causes some pain at R wrist but reports this pain is much worse. pt works as a  and was busy during the holidays recently. pt reports pain worse with moving fingers. site is slightly warmer but not red. minimal swelling noted. pt denies any cuts. pt denies any injury to site. no other joint pain. no history of gout. pt hypertensive, history of HTN, did not take meds this AM. very tender upon palpation, in fingers and in wrist. pulses strong bilaterally. no fever, chills.

## 2020-12-27 NOTE — ED PROVIDER NOTE - PROGRESS NOTE DETAILS
Red - pt vomited x1 s/p morphine admin. gave zofran after. await XR. Pain is likely overuse injury after busy holiday weekend as a . Red - I reassesed the patient. His pain is improving. He remains afebrile. I cannot palpate a swollen joint effusion. On bedside utltrasound, there is no joint effusions seen in any compartment of the hand. It is unlikely that a patient with no fever and normal WBC on blood count to have septic joint. Due to his story of sudden overuse of the joint, arthritis flair and or overuse injury is much more likely. Return precautions given, and patient would prefer to go home in Ace wrap then stay in hosptial for pain control.

## 2020-12-30 ENCOUNTER — NON-APPOINTMENT (OUTPATIENT)
Age: 75
End: 2020-12-30

## 2020-12-30 ENCOUNTER — APPOINTMENT (OUTPATIENT)
Dept: ORTHOPEDIC SURGERY | Facility: CLINIC | Age: 75
End: 2020-12-30
Payer: MEDICARE

## 2020-12-30 PROCEDURE — 20605 DRAIN/INJ JOINT/BURSA W/O US: CPT | Mod: RT

## 2020-12-30 PROCEDURE — 99072 ADDL SUPL MATRL&STAF TM PHE: CPT

## 2020-12-30 PROCEDURE — 99213 OFFICE O/P EST LOW 20 MIN: CPT | Mod: 25

## 2021-01-07 ENCOUNTER — APPOINTMENT (OUTPATIENT)
Dept: ORTHOPEDIC SURGERY | Facility: CLINIC | Age: 76
End: 2021-01-07
Payer: MEDICARE

## 2021-01-07 DIAGNOSIS — M77.8 OTHER ENTHESOPATHIES, NOT ELSEWHERE CLASSIFIED: ICD-10-CM

## 2021-01-07 DIAGNOSIS — M19.039 PRIMARY OSTEOARTHRITIS, UNSPECIFIED WRIST: ICD-10-CM

## 2021-01-07 PROCEDURE — 99213 OFFICE O/P EST LOW 20 MIN: CPT

## 2021-01-07 PROCEDURE — 99072 ADDL SUPL MATRL&STAF TM PHE: CPT

## 2021-03-17 LAB — RENIN ACTIVITY, PLASMA: 5.3 NG/ML/HR

## 2021-10-15 ENCOUNTER — INPATIENT (INPATIENT)
Facility: HOSPITAL | Age: 76
LOS: 0 days | Discharge: LEFT BEFORE DISCHARGE | DRG: 316 | End: 2021-10-15
Attending: SURGERY | Admitting: SURGERY
Payer: MEDICARE

## 2021-10-15 VITALS
SYSTOLIC BLOOD PRESSURE: 148 MMHG | TEMPERATURE: 98 F | HEIGHT: 66 IN | RESPIRATION RATE: 18 BRPM | DIASTOLIC BLOOD PRESSURE: 101 MMHG | WEIGHT: 201.94 LBS | OXYGEN SATURATION: 94 % | HEART RATE: 65 BPM

## 2021-10-15 VITALS
SYSTOLIC BLOOD PRESSURE: 162 MMHG | TEMPERATURE: 98 F | RESPIRATION RATE: 20 BRPM | DIASTOLIC BLOOD PRESSURE: 113 MMHG | OXYGEN SATURATION: 96 % | HEART RATE: 72 BPM

## 2021-10-15 DIAGNOSIS — I71.4 ABDOMINAL AORTIC ANEURYSM, WITHOUT RUPTURE: ICD-10-CM

## 2021-10-15 DIAGNOSIS — R10.31 RIGHT LOWER QUADRANT PAIN: ICD-10-CM

## 2021-10-15 DIAGNOSIS — I97.89 OTHER POSTPROCEDURAL COMPLICATIONS AND DISORDERS OF THE CIRCULATORY SYSTEM, NOT ELSEWHERE CLASSIFIED: ICD-10-CM

## 2021-10-15 DIAGNOSIS — Z96.643 PRESENCE OF ARTIFICIAL HIP JOINT, BILATERAL: Chronic | ICD-10-CM

## 2021-10-15 LAB
ALBUMIN SERPL ELPH-MCNC: 4.4 G/DL — SIGNIFICANT CHANGE UP (ref 3.3–5)
ALP SERPL-CCNC: 62 U/L — SIGNIFICANT CHANGE UP (ref 40–120)
ALT FLD-CCNC: 24 U/L — SIGNIFICANT CHANGE UP (ref 10–45)
ANION GAP SERPL CALC-SCNC: 13 MMOL/L — SIGNIFICANT CHANGE UP (ref 5–17)
APPEARANCE UR: CLEAR — SIGNIFICANT CHANGE UP
APTT BLD: 26.8 SEC — LOW (ref 27.5–35.5)
AST SERPL-CCNC: 20 U/L — SIGNIFICANT CHANGE UP (ref 10–40)
BACTERIA # UR AUTO: NEGATIVE — SIGNIFICANT CHANGE UP
BASE EXCESS BLDV CALC-SCNC: -0.6 MMOL/L — SIGNIFICANT CHANGE UP (ref -2–2)
BASOPHILS # BLD AUTO: 0.06 K/UL — SIGNIFICANT CHANGE UP (ref 0–0.2)
BASOPHILS NFR BLD AUTO: 0.7 % — SIGNIFICANT CHANGE UP (ref 0–2)
BILIRUB SERPL-MCNC: 0.7 MG/DL — SIGNIFICANT CHANGE UP (ref 0.2–1.2)
BILIRUB UR-MCNC: NEGATIVE — SIGNIFICANT CHANGE UP
BLD GP AB SCN SERPL QL: NEGATIVE — SIGNIFICANT CHANGE UP
BUN SERPL-MCNC: 36 MG/DL — HIGH (ref 7–23)
CA-I SERPL-SCNC: 1.27 MMOL/L — SIGNIFICANT CHANGE UP (ref 1.15–1.33)
CALCIUM SERPL-MCNC: 9.4 MG/DL — SIGNIFICANT CHANGE UP (ref 8.4–10.5)
CHLORIDE BLDV-SCNC: 104 MMOL/L — SIGNIFICANT CHANGE UP (ref 96–108)
CHLORIDE SERPL-SCNC: 103 MMOL/L — SIGNIFICANT CHANGE UP (ref 96–108)
CO2 BLDV-SCNC: 26 MMOL/L — SIGNIFICANT CHANGE UP (ref 22–26)
CO2 SERPL-SCNC: 20 MMOL/L — LOW (ref 22–31)
COLOR SPEC: SIGNIFICANT CHANGE UP
CREAT SERPL-MCNC: 1.56 MG/DL — HIGH (ref 0.5–1.3)
DIFF PNL FLD: NEGATIVE — SIGNIFICANT CHANGE UP
EOSINOPHIL # BLD AUTO: 0.19 K/UL — SIGNIFICANT CHANGE UP (ref 0–0.5)
EOSINOPHIL NFR BLD AUTO: 2.3 % — SIGNIFICANT CHANGE UP (ref 0–6)
EPI CELLS # UR: 0 — SIGNIFICANT CHANGE UP
GAS PNL BLDV: 137 MMOL/L — SIGNIFICANT CHANGE UP (ref 136–145)
GAS PNL BLDV: SIGNIFICANT CHANGE UP
GAS PNL BLDV: SIGNIFICANT CHANGE UP
GLUCOSE BLDV-MCNC: 102 MG/DL — HIGH (ref 70–99)
GLUCOSE SERPL-MCNC: 101 MG/DL — HIGH (ref 70–99)
GLUCOSE UR QL: NEGATIVE — SIGNIFICANT CHANGE UP
HCO3 BLDV-SCNC: 25 MMOL/L — SIGNIFICANT CHANGE UP (ref 22–29)
HCT VFR BLD CALC: 41 % — SIGNIFICANT CHANGE UP (ref 39–50)
HCT VFR BLDA CALC: 41 % — SIGNIFICANT CHANGE UP (ref 39–51)
HGB BLD CALC-MCNC: 13.8 G/DL — SIGNIFICANT CHANGE UP (ref 12.6–17.4)
HGB BLD-MCNC: 13.3 G/DL — SIGNIFICANT CHANGE UP (ref 13–17)
HYALINE CASTS # UR AUTO: 0 /LPF — SIGNIFICANT CHANGE UP (ref 0–7)
IMM GRANULOCYTES NFR BLD AUTO: 0.6 % — SIGNIFICANT CHANGE UP (ref 0–1.5)
INR BLD: 1.03 RATIO — SIGNIFICANT CHANGE UP (ref 0.88–1.16)
KETONES UR-MCNC: NEGATIVE — SIGNIFICANT CHANGE UP
LACTATE BLDV-MCNC: 1 MMOL/L — SIGNIFICANT CHANGE UP (ref 0.7–2)
LEUKOCYTE ESTERASE UR-ACNC: NEGATIVE — SIGNIFICANT CHANGE UP
LYMPHOCYTES # BLD AUTO: 1.17 K/UL — SIGNIFICANT CHANGE UP (ref 1–3.3)
LYMPHOCYTES # BLD AUTO: 14.4 % — SIGNIFICANT CHANGE UP (ref 13–44)
MCHC RBC-ENTMCNC: 30 PG — SIGNIFICANT CHANGE UP (ref 27–34)
MCHC RBC-ENTMCNC: 32.4 GM/DL — SIGNIFICANT CHANGE UP (ref 32–36)
MCV RBC AUTO: 92.6 FL — SIGNIFICANT CHANGE UP (ref 80–100)
MONOCYTES # BLD AUTO: 0.73 K/UL — SIGNIFICANT CHANGE UP (ref 0–0.9)
MONOCYTES NFR BLD AUTO: 9 % — SIGNIFICANT CHANGE UP (ref 2–14)
NEUTROPHILS # BLD AUTO: 5.9 K/UL — SIGNIFICANT CHANGE UP (ref 1.8–7.4)
NEUTROPHILS NFR BLD AUTO: 73 % — SIGNIFICANT CHANGE UP (ref 43–77)
NITRITE UR-MCNC: NEGATIVE — SIGNIFICANT CHANGE UP
NRBC # BLD: 0 /100 WBCS — SIGNIFICANT CHANGE UP (ref 0–0)
PCO2 BLDV: 43 MMHG — SIGNIFICANT CHANGE UP (ref 42–55)
PH BLDV: 7.37 — SIGNIFICANT CHANGE UP (ref 7.32–7.43)
PH UR: 6 — SIGNIFICANT CHANGE UP (ref 5–8)
PLATELET # BLD AUTO: 166 K/UL — SIGNIFICANT CHANGE UP (ref 150–400)
PO2 BLDV: 40 MMHG — SIGNIFICANT CHANGE UP (ref 25–45)
POTASSIUM BLDV-SCNC: 4.1 MMOL/L — SIGNIFICANT CHANGE UP (ref 3.5–5.1)
POTASSIUM SERPL-MCNC: 4.2 MMOL/L — SIGNIFICANT CHANGE UP (ref 3.5–5.3)
POTASSIUM SERPL-SCNC: 4.2 MMOL/L — SIGNIFICANT CHANGE UP (ref 3.5–5.3)
PROT SERPL-MCNC: 7 G/DL — SIGNIFICANT CHANGE UP (ref 6–8.3)
PROT UR-MCNC: NEGATIVE — SIGNIFICANT CHANGE UP
PROTHROM AB SERPL-ACNC: 12.3 SEC — SIGNIFICANT CHANGE UP (ref 10.6–13.6)
RBC # BLD: 4.43 M/UL — SIGNIFICANT CHANGE UP (ref 4.2–5.8)
RBC # FLD: 14.1 % — SIGNIFICANT CHANGE UP (ref 10.3–14.5)
RBC CASTS # UR COMP ASSIST: 0 /HPF — SIGNIFICANT CHANGE UP (ref 0–4)
RH IG SCN BLD-IMP: POSITIVE — SIGNIFICANT CHANGE UP
RH IG SCN BLD-IMP: POSITIVE — SIGNIFICANT CHANGE UP
SAO2 % BLDV: 70.6 % — SIGNIFICANT CHANGE UP (ref 67–88)
SARS-COV-2 RNA SPEC QL NAA+PROBE: SIGNIFICANT CHANGE UP
SODIUM SERPL-SCNC: 136 MMOL/L — SIGNIFICANT CHANGE UP (ref 135–145)
SP GR SPEC: 1.05 — SIGNIFICANT CHANGE UP (ref 1.01–1.02)
UROBILINOGEN FLD QL: NEGATIVE — SIGNIFICANT CHANGE UP
WBC # BLD: 8.1 K/UL — SIGNIFICANT CHANGE UP (ref 3.8–10.5)
WBC # FLD AUTO: 8.1 K/UL — SIGNIFICANT CHANGE UP (ref 3.8–10.5)
WBC UR QL: 0 /HPF — SIGNIFICANT CHANGE UP (ref 0–5)

## 2021-10-15 PROCEDURE — 71275 CT ANGIOGRAPHY CHEST: CPT | Mod: 26,MA

## 2021-10-15 PROCEDURE — 93010 ELECTROCARDIOGRAM REPORT: CPT

## 2021-10-15 PROCEDURE — 74174 CTA ABD&PLVS W/CONTRAST: CPT | Mod: 26,MA

## 2021-10-15 PROCEDURE — 99221 1ST HOSP IP/OBS SF/LOW 40: CPT

## 2021-10-15 PROCEDURE — 99285 EMERGENCY DEPT VISIT HI MDM: CPT

## 2021-10-15 RX ORDER — ACETAMINOPHEN 500 MG
650 TABLET ORAL ONCE
Refills: 0 | Status: COMPLETED | OUTPATIENT
Start: 2021-10-15 | End: 2021-10-15

## 2021-10-15 RX ORDER — SODIUM CHLORIDE 9 MG/ML
1000 INJECTION INTRAMUSCULAR; INTRAVENOUS; SUBCUTANEOUS ONCE
Refills: 0 | Status: COMPLETED | OUTPATIENT
Start: 2021-10-15 | End: 2021-10-15

## 2021-10-15 RX ADMIN — Medication 650 MILLIGRAM(S): at 08:38

## 2021-10-15 RX ADMIN — SODIUM CHLORIDE 1000 MILLILITER(S): 9 INJECTION INTRAMUSCULAR; INTRAVENOUS; SUBCUTANEOUS at 08:04

## 2021-10-15 RX ADMIN — Medication 650 MILLIGRAM(S): at 08:04

## 2021-10-15 RX ADMIN — SODIUM CHLORIDE 1000 MILLILITER(S): 9 INJECTION INTRAMUSCULAR; INTRAVENOUS; SUBCUTANEOUS at 08:38

## 2021-10-15 NOTE — H&P ADULT - NSHPLABSRESULTS_GEN_ALL_CORE
Complete Blood Count + Automated Diff (10.15.21 @ 08:07)    WBC Count: 8.10 K/uL    RBC Count: 4.43 M/uL    Hemoglobin: 13.3 g/dL    Hematocrit: 41.0 %    Mean Cell Volume: 92.6 fl    Mean Cell Hemoglobin: 30.0 pg    Mean Cell Hemoglobin Conc: 32.4 gm/dL    Red Cell Distrib Width: 14.1 %    Platelet Count - Automated: 166 K/uL    Auto Neutrophil #: 5.90 K/uL    Auto Lymphocyte #: 1.17 K/uL    Auto Monocyte #: 0.73 K/uL    Auto Eosinophil #: 0.19 K/uL    Auto Basophil #: 0.06 K/uL    Auto Neutrophil %: 73.0: Differential percentages must be correlated with absolute numbers for  clinical significance. %    Auto Lymphocyte %: 14.4 %    Auto Monocyte %: 9.0 %    Auto Eosinophil %: 2.3 %    Auto Basophil %: 0.7 %    Auto Immature Granulocyte %: 0.6: (Includes meta, myelo and promyelocytes) %    Nucleated RBC: 0 /100 WBCs

## 2021-10-15 NOTE — ED ADULT NURSE REASSESSMENT NOTE - NS ED NURSE REASSESS COMMENT FT1
received report from RADHA Lara. Pt is awake and alert, Surgery MD at bedside speaking w/ patient. Pt states he would like to AMA.

## 2021-10-15 NOTE — H&P ADULT - NSHPPHYSICALEXAM_GEN_ALL_CORE
Physical Exam:  General: NAD, resting comfortably  Pulmonary: non labored breathing  Cardiovascular: NSR, no murmurs  Abdominal: soft, tender to palpation in RLQ with slight rebound tenderness, and moderately tender to palpation in LLQ  Extremities: WWP, normal strength, no clubbing/cyanosis/edema  Neuro: A/O x 3,   Pulses: palpable DP/PT b/l, popliteals and femorals

## 2021-10-15 NOTE — CONSULT NOTE ADULT - SUBJECTIVE AND OBJECTIVE BOX
HPI: 75 YO M PMH of AAA x3 repair presenting with three days of right lower quadrant pain. Patient states in past for prior AAA repairs, did not have similar pain but was concerned for repeat endoleak, similar to what he had in the past.  Denies fevers, chills, nausea, vomiting at this time. CT scan obtained in the ED concerning for endovascular repair leak, possible type III. No active extravasation. At time of encounter patient is resting comfortably in ED, no acute distress.     PAST MEDICAL & SURGICAL HISTORY:  Right knee pain    Hypertension    Dyslipidemia    AAA (abdominal aortic aneurysm)  last sonogram 3/2018 :4.9    Iliac artery aneurysm, left  last sonogram 3/2018; 3.5 cm    Varicose veins    Nephrolithiasis    H/O bilateral hip replacements  right 2008  left         MEDICATIONS  (STANDING):    MEDICATIONS  (PRN):      Allergies    No Known Allergies    Intolerances        SOCIAL HISTORY:    FAMILY HISTORY:  Family history of stomach cancer (Father)    Physical Exam:  General: NAD, resting comfortably  Pulmonary: non labored breathing  Cardiovascular: NSR, no murmurs  Abdominal: soft, tender to palpation in RLQ with slight rebound tenderness, and moderately tender to palpation in LLQ  Extremities: WWP, normal strength, no clubbing/cyanosis/edema  Neuro: A/O x 3,   Pulses: palpable DP/PT b/l, popliteals and femorals    Vital Signs Last 24 Hrs  T(C): 36.7 (15 Oct 2021 06:54), Max: 36.7 (15 Oct 2021 06:54)  T(F): 98 (15 Oct 2021 06:54), Max: 98 (15 Oct 2021 06:54)  HR: 61 (15 Oct 2021 15:58) (57 - 65)  BP: 143/86 (15 Oct 2021 15:58) (143/86 - 148/101)  BP(mean): --  RR: 16 (15 Oct 2021 15:58) (16 - 18)  SpO2: 100% (15 Oct 2021 15:58) (94% - 100%)    I&O's Summary          LABS:                        13.3   8.10  )-----------( 166      ( 15 Oct 2021 08:07 )             41.0     10-15    136  |  103  |  36<H>  ----------------------------<  101<H>  4.2   |  20<L>  |  1.56<H>    Ca    9.4      15 Oct 2021 08:07    TPro  7.0  /  Alb  4.4  /  TBili  0.7  /  DBili  x   /  AST  20  /  ALT  24  /  AlkPhos  62  10-15    PT/INR - ( 15 Oct 2021 08:07 )   PT: 12.3 sec;   INR: 1.03 ratio         PTT - ( 15 Oct 2021 08:07 )  PTT:26.8 sec  Urinalysis Basic - ( 15 Oct 2021 09:54 )    Color: Light Yellow / Appearance: Clear / S.048 / pH: x  Gluc: x / Ketone: Negative  / Bili: Negative / Urobili: Negative   Blood: x / Protein: Negative / Nitrite: Negative   Leuk Esterase: Negative / RBC: 0 /hpf / WBC 0 /HPF   Sq Epi: x / Non Sq Epi: 0 / Bacteria: Negative      CAPILLARY BLOOD GLUCOSE        LIVER FUNCTIONS - ( 15 Oct 2021 08:07 )  Alb: 4.4 g/dL / Pro: 7.0 g/dL / ALK PHOS: 62 U/L / ALT: 24 U/L / AST: 20 U/L / GGT: x             Cultures:      RADIOLOGY & ADDITIONAL STUDIES:    < from: CT Angio Chest Aorta w/wo IV Cont (10.15.21 @ 08:38) >    PROCEDURE:  CT Angiography of the Chest, Abdomen and Pelvis.  Gated precontrast imaging was performed through the heart followed by gated CT Angiography of the heart with subsequent non-gated arterialphase imaging of the chest, abdomen and pelvis.  Sagittal and coronal reformats were performed as well as 3D (MIP) reconstructions.    FINDINGS:  CHEST:  LUNGS AND LARGE AIRWAYS: Patent central airways. Few areas of subsegmental atelectasis.  PLEURA:No pleural effusion or pneumothorax.  VESSELS: No aortic intramural hematoma, dissection or aneurysm. Atherosclerotic calcification.  HEART: Heart size is normal. No pericardial effusion. Coronary artery calcification.  MEDIASTINUM AND DESIREE: No lymphadenopathy.  CHEST WALL AND LOWER NECK: Within normal limits.    ABDOMEN AND PELVIS:  LIVER: Within normal limits.  BILE DUCTS: Normal caliber.  GALLBLADDER: Within normal limits.  SPLEEN: Within normal limits.  PANCREAS: Within normal limits.  ADRENALS: Within normal limits.  KIDNEYS/URETERS: Left kidney atrophy. Left upper pole 1.3 cm non-obstructing calculus. Right parenchymal and parapelvic cysts measure up to 4.5 cm in the upper pole, previously 3.5 cm.    BLADDER: Evaluation is limited by metal artifact..  REPRODUCTIVE ORGANS: Prostate normal in size. Evaluation is limited by metal artifact.    BOWEL: Colonic diverticuli without acute diverticulitis. No bowel obstruction. Appendix within normal limits.  PERITONEUM: No ascites.  VESSELS: Status post endovascular abdominal aortic repair extending from the level of the kidneys to the left common iliac artery. The associated aortic aneurysm measures a maximum of 6.3 cm at the  level of the aortic bifurcation into the common iliac arteries, previously 5.4 cm. Layering high density material in the aorta may reflect blood product. No active extravasation of contrast is identified.No aortic dissection.  RETROPERITONEUM/LYMPH NODES: No lymphadenopathy.  ABDOMINAL WALL: Small bilateral fatcontaining umbilical and bilateral inguinal hernias.  BONES: Bilateral total hip arthroplasty. Degenerative spinal disease.    IMPRESSION:    Status post endovascular abdominal aortic repair extending from the level of the kidneys to the left common iliac artery. The associated infrarenal aortic aneurysm sac measures a maximum of 6.3 cm at the level of the common iliac bifurcation, previously 5.4 cm, concerning for endovascular repair leak. Layering high density material in the aorta may reflect blood product. No active extravasation of contrast is identified.    < end of copied text >        Plan:  75 YO M PMH of AAA x3 repair presenting with three days of right lower quadrant pain with CT scan concerning for possible endoleak.     - patient followed by Dr. Hernandez, at [facility]  - team has contacted office to obtain outside medical records   - plan to follow    discussed with vascular fellow    Vascular Surgery, [id number]

## 2021-10-15 NOTE — ED PROVIDER NOTE - NS ED ROS FT
Gen: Denies fever, chills, generalized weakness  CV: Denies chest pain, palpitations  HEENT: Denies neck pain, headache, vision changes  Skin: Denies rash, erythema, color changes  Resp: Denies SOB, cough  Endo: Denies sensitivity to heat, cold, increased urination  GI: Denies constipation, nausea, vomiting, diarrhea  Msk: Denies back pain, LE swelling, extremity pain  : Denies dysuria, increased frequency  Neuro: Denies LOC, new focal weakness, numbness, tingling  Psych: Denies hx of psych, SI, HI

## 2021-10-15 NOTE — ED PROVIDER NOTE - PHYSICAL EXAMINATION
GENERAL: Patient lying in bed comfortably. In no acute distress. Answering questions appropriately.   HEENT: NC/AT, PERRL, EOMI b/l, conjunctiva noninjected and anicteric,  moist mucous membranes  LUNG: CTAB, no w/r/r appreciated, good respiratory effort  CV: RRR, no m/r/g appreciated, Strong pulse in RLE, Dopplarable pulse in LLE  ABDOMEN: Soft, TTP in RLQ, no rebound or guarding, no appreciable organomegaly  BACK: No CVA tenderness b/l  MSK: No edema, no visible deformities, full range of motion UE/LE b/l, 5/5 muscle strength UE/LE b/l  NEURO: AAOx4 (to person, place, time, event), CN II-XII grossly intact, sensation grossly intact, steady gait,  SKIN: Warm, dry, well perfused, no evidence of rash  PSYCH: Normal mood and affect

## 2021-10-15 NOTE — H&P ADULT - NSICDXPASTMEDICALHX_GEN_ALL_CORE_FT
PAST MEDICAL HISTORY:  AAA (abdominal aortic aneurysm) last sonogram 3/2018 :4.9    Dyslipidemia     Hypertension     Iliac artery aneurysm, left last sonogram 3/2018; 3.5 cm    Nephrolithiasis     Right knee pain     Varicose veins

## 2021-10-15 NOTE — ED PROVIDER NOTE - CLINICAL SUMMARY MEDICAL DECISION MAKING FREE TEXT BOX
Joseph Frankel PGY3: 77 yo with pmh of AAA repair x3 presenting with RLQ pain x 3 days. VSS. Patient looks well and is non toxic appearing. PE as above. Although patient very well appearing, given pulse differences which patient states he thinks is new concern for AAA leak/dissection. Spoke about the risks and benefits of getting CT scan without creatinine back. Previous creatinine 1.3-1.5. As there is concern for AAA leakage patient agreed to scan without labs. CT will also eval for appy vs renal stone. Will also get labs, treat pain, give fluids, and reassess. Kael Frankel PGY3: 75 yo with pmh of AAA repair x3 presenting with RLQ pain x 3 days. VSS. Patient looks well and is non toxic appearing. PE as above. Although patient very well appearing, given pulse differences which patient states he thinks is new concern for AAA leak/dissection. Spoke about the risks and benefits of getting CT scan without creatinine back. Previous creatinine 1.3-1.5. As there is concern for AAA leakage patient agreed to scan without labs. CT will also eval for appy vs renal stone. Will also get labs, treat pain, give fluids, and reassess.    Attending MD Chappell:  75 yo male with PMH for AAA repair x 3 at [place] presents with RLQ pain x 3 days.   On exam RLQ pain and Left LE pulses < R but dopplerable on left.   DDX Aneurysms complication/dissection, appy, renal colic.  Plan; CTA Abdomen and pelvis.

## 2021-10-15 NOTE — ED PROVIDER NOTE - OBJECTIVE STATEMENT
77 yo M with ho HTN, HLD, infrarenal AAA repair x3 ([facility]) presenting with RLQ pain x 3 days. Sharp in nature. Non radiating. Moderate to severe. Worse with movement. Denies n/v/d, fever, dysuria. Endorses chronic LLE weakness>RLE. Otherwise no new deficits.

## 2021-10-15 NOTE — CONSULT NOTE ADULT - ASSESSMENT
76 y old male s/p previous evar w current endoleak most likely from  right limb    pt was offered and d/w pt  hosp admission,  dx aortogram and rep of endoleak  endoleak is most likely a type 2  but there is a possibility that it is a type 1 b this will be clarified by the angiography   pt left [facility]  and stated that he wants to f/u at [facility] Dr Salmon  pt advised to ret to ns if he wishes to do so

## 2021-10-15 NOTE — H&P ADULT - ASSESSMENT
Plan:  75 YO M PMH of AAA x3 repair presenting with three days of right lower quadrant pain with CT scan concerning for possible endoleak.     - patient followed by Dr. Hernandez, at [facility]  - team has contacted office to obtain outside medical records   - patient to be admitted to Dr. Mcneil, vascular surgery attending  - likely OR next week for endoleak  - medical and cardiac clearance need to be documented   - preop  - plan to follow    discussed with vascular fellow    Vascular Surgery, [id number]

## 2021-10-15 NOTE — ED PROVIDER NOTE - PROGRESS NOTE DETAILS
Joseph Frankel PGY3: Spoke with CT tech about expediting CT scan. Will send for patient. Joseph Frankel PGY3: Surgery will admit patient. Patient stable.

## 2021-10-15 NOTE — ED PROVIDER NOTE - ATTENDING CONTRIBUTION TO CARE
Attending MD Chappell:  I personally have seen and examined this patient.  Resident note reviewed and agree on plan of care and except where noted.

## 2021-10-15 NOTE — ED ADULT NURSE NOTE - OBJECTIVE STATEMENT
76 y m came to the ed c/o rlq abdominal pain. pain started 3 days ago but became progressively worse today waking him up from sleep. patient has hx of htn and three AAA repairs. patient c/o left leg weakness although says it is not new. patient left lower extremity pulse is not palpable although is able to be found with doppler. all 3 other pulses are able to palpated. patient is a/ox3. denies fevers, chills, chest pain, sob, cough. abdomen is soft but tender in rlq. denies pain/burning/bood in the urine. skin is warm and dry.

## 2021-10-15 NOTE — CHART NOTE - NSCHARTNOTEFT_GEN_A_CORE
The patient wishes to leave against medical advice.      I have discussed the risks, benefits and alternatives (including the possibility of worsening of disease, pain, permanent disability, and/or death) with the patient and his spouse.  The patient voices understanding of these risks, benefits, and alternatives and still wishes to sign out against medical advice.  The patient is awake, alert, oriented  x3 and has demonstrated capacity to refuse/direct care.  I have advised the patient that they can and should return immediately should they develop any worse/different/additional symptoms, or if they change their mind and want to continue their care.       VASCULAR  [phone]

## 2021-10-15 NOTE — H&P ADULT - HISTORY OF PRESENT ILLNESS
77 YO M PMH of AAA x3 repair presenting with three days of right lower quadrant pain. Patient states in past for prior AAA repairs, did not have similar pain but was concerned for repeat endoleak, similar to what he had in the past.  Denies fevers, chills, nausea, vomiting at this time. CT scan obtained in the ED concerning for endovascular repair leak, possible type III. No active extravasation. At time of encounter patient is resting comfortably in ED, no acute distress.

## 2021-10-15 NOTE — ED ADULT NURSE REASSESSMENT NOTE - NS ED NURSE REASSESS COMMENT FT1
surgery MD states pt signed AMA paperwork, pt verbalized understanding of risks in leaving hospital. MD states pt okay to leave at this time.

## 2021-10-16 LAB
CULTURE RESULTS: SIGNIFICANT CHANGE UP
SPECIMEN SOURCE: SIGNIFICANT CHANGE UP

## 2021-10-27 ENCOUNTER — APPOINTMENT (OUTPATIENT)
Dept: INTERNAL MEDICINE | Facility: CLINIC | Age: 76
End: 2021-10-27
Payer: MEDICARE

## 2021-10-27 VITALS
OXYGEN SATURATION: 97 % | HEART RATE: 62 BPM | DIASTOLIC BLOOD PRESSURE: 83 MMHG | WEIGHT: 232 LBS | HEIGHT: 66 IN | SYSTOLIC BLOOD PRESSURE: 138 MMHG | BODY MASS INDEX: 37.28 KG/M2 | TEMPERATURE: 97.6 F

## 2021-10-27 DIAGNOSIS — Z13.820 ENCOUNTER FOR SCREENING FOR OSTEOPOROSIS: ICD-10-CM

## 2021-10-27 DIAGNOSIS — K59.00 CONSTIPATION, UNSPECIFIED: ICD-10-CM

## 2021-10-27 PROCEDURE — 90662 IIV NO PRSV INCREASED AG IM: CPT

## 2021-10-27 PROCEDURE — 36415 COLL VENOUS BLD VENIPUNCTURE: CPT

## 2021-10-27 PROCEDURE — G0444 DEPRESSION SCREEN ANNUAL: CPT

## 2021-10-27 PROCEDURE — G0439: CPT

## 2021-10-27 PROCEDURE — G0008: CPT

## 2021-10-27 RX ORDER — ATORVASTATIN CALCIUM 40 MG/1
40 TABLET, FILM COATED ORAL
Refills: 0 | Status: DISCONTINUED | COMMUNITY
End: 2021-10-27

## 2021-10-27 NOTE — PHYSICAL EXAM
[Well Nourished] : well nourished [Well Developed] : well developed [Normal Sclera/Conjunctiva] : normal sclera/conjunctiva [PERRL] : pupils equal round and reactive to light [EOMI] : extraocular movements intact [Normal Outer Ear/Nose] : the outer ears and nose were normal in appearance [Normal Oropharynx] : the oropharynx was normal [No JVD] : no jugular venous distention [No Lymphadenopathy] : no lymphadenopathy [Supple] : supple [Thyroid Normal, No Nodules] : the thyroid was normal and there were no nodules present [No Respiratory Distress] : no respiratory distress  [No Accessory Muscle Use] : no accessory muscle use [Clear to Auscultation] : lungs were clear to auscultation bilaterally [Normal Rate] : normal rate  [Regular Rhythm] : with a regular rhythm [Normal S1, S2] : normal S1 and S2 [No Murmur] : no murmur heard [Pedal Pulses Present] : the pedal pulses are present [No Edema] : there was no peripheral edema [No Palpable Aorta] : no palpable aorta [No Extremity Clubbing/Cyanosis] : no extremity clubbing/cyanosis [Soft] : abdomen soft [Non Tender] : non-tender [Non-distended] : non-distended [No Masses] : no abdominal mass palpated [Normal Bowel Sounds] : normal bowel sounds [Normal Supraclavicular Nodes] : no supraclavicular lymphadenopathy [Normal Posterior Cervical Nodes] : no posterior cervical lymphadenopathy [Normal Anterior Cervical Nodes] : no anterior cervical lymphadenopathy [No CVA Tenderness] : no CVA  tenderness [No Spinal Tenderness] : no spinal tenderness [No Joint Swelling] : no joint swelling [Grossly Normal Strength/Tone] : grossly normal strength/tone [No Rash] : no rash [Coordination Grossly Intact] : coordination grossly intact [No Focal Deficits] : no focal deficits [Normal Gait] : normal gait [Normal Affect] : the affect was normal [Normal Insight/Judgement] : insight and judgment were intact [Comprehensive Foot Exam Normal] : Right and left foot were examined and both feet are normal. No ulcers in either foot. Toes are normal and with full ROM.  Normal tactile sensation with monofilament testing throughout both feet

## 2021-10-29 LAB
25(OH)D3 SERPL-MCNC: 41.4 NG/ML
ALBUMIN SERPL ELPH-MCNC: 4.6 G/DL
ALP BLD-CCNC: 72 U/L
ALT SERPL-CCNC: 15 U/L
ANION GAP SERPL CALC-SCNC: 14 MMOL/L
APPEARANCE: CLEAR
AST SERPL-CCNC: 15 U/L
BACTERIA: NEGATIVE
BASOPHILS # BLD AUTO: 0.05 K/UL
BASOPHILS NFR BLD AUTO: 0.7 %
BILIRUB SERPL-MCNC: 0.7 MG/DL
BILIRUBIN URINE: NEGATIVE
BLOOD URINE: NEGATIVE
BUN SERPL-MCNC: 30 MG/DL
CALCIUM SERPL-MCNC: 9.5 MG/DL
CALCIUM SERPL-MCNC: 9.5 MG/DL
CHLORIDE SERPL-SCNC: 105 MMOL/L
CHOLEST SERPL-MCNC: 157 MG/DL
CO2 SERPL-SCNC: 21 MMOL/L
COLOR: YELLOW
CREAT SERPL-MCNC: 1.65 MG/DL
CRP SERPL HS-MCNC: 5.25 MG/L
EOSINOPHIL # BLD AUTO: 0.21 K/UL
EOSINOPHIL NFR BLD AUTO: 2.8 %
ESTIMATED AVERAGE GLUCOSE: 114 MG/DL
GLUCOSE QUALITATIVE U: NEGATIVE
GLUCOSE SERPL-MCNC: 96 MG/DL
HBA1C MFR BLD HPLC: 5.6 %
HCT VFR BLD CALC: 43.5 %
HDLC SERPL-MCNC: 36 MG/DL
HGB BLD-MCNC: 13.9 G/DL
HYALINE CASTS: 0 /LPF
IMM GRANULOCYTES NFR BLD AUTO: 0.4 %
KETONES URINE: NEGATIVE
LDLC SERPL CALC-MCNC: 99 MG/DL
LEUKOCYTE ESTERASE URINE: NEGATIVE
LYMPHOCYTES # BLD AUTO: 1.19 K/UL
LYMPHOCYTES NFR BLD AUTO: 16 %
MAN DIFF?: NORMAL
MCHC RBC-ENTMCNC: 31 PG
MCHC RBC-ENTMCNC: 32 GM/DL
MCV RBC AUTO: 96.9 FL
MICROSCOPIC-UA: NORMAL
MONOCYTES # BLD AUTO: 0.58 K/UL
MONOCYTES NFR BLD AUTO: 7.8 %
NEUTROPHILS # BLD AUTO: 5.38 K/UL
NEUTROPHILS NFR BLD AUTO: 72.3 %
NITRITE URINE: NEGATIVE
NONHDLC SERPL-MCNC: 122 MG/DL
PARATHYROID HORMONE INTACT: 53 PG/ML
PH URINE: 5.5
PLATELET # BLD AUTO: 203 K/UL
POTASSIUM SERPL-SCNC: 4.8 MMOL/L
PROT SERPL-MCNC: 7.4 G/DL
PROTEIN URINE: NEGATIVE
PSA SERPL-MCNC: 3.07 NG/ML
RBC # BLD: 4.49 M/UL
RBC # FLD: 14.5 %
RED BLOOD CELLS URINE: 1 /HPF
SODIUM SERPL-SCNC: 140 MMOL/L
SPECIFIC GRAVITY URINE: 1.02
SQUAMOUS EPITHELIAL CELLS: 0 /HPF
TRIGL SERPL-MCNC: 114 MG/DL
UROBILINOGEN URINE: NORMAL
VIT B12 SERPL-MCNC: 550 PG/ML
WBC # FLD AUTO: 7.44 K/UL
WHITE BLOOD CELLS URINE: 0 /HPF

## 2021-10-29 NOTE — HEALTH RISK ASSESSMENT
[None] : None [With Family] : lives with family [Retired] : retired [Feels Safe at Home] : Feels safe at home [Fully functional (bathing, dressing, toileting, transferring, walking, feeding)] : Fully functional (bathing, dressing, toileting, transferring, walking, feeding) [Fully functional (using the telephone, shopping, preparing meals, housekeeping, doing laundry, using] : Fully functional and needs no help or supervision to perform IADLs (using the telephone, shopping, preparing meals, housekeeping, doing laundry, using transportation, managing medications and managing finances) [Smoke Detector] : smoke detector [Carbon Monoxide Detector] : carbon monoxide detector [Seat Belt] :  uses seat belt [Sunscreen] : uses sunscreen [With Patient/Caregiver] : , with patient/caregiver [Very Good] : ~his/her~  mood as very good [No] : In the past 12 months have you used drugs other than those required for medical reasons? No [No falls in past year] : Patient reported no falls in the past year [0] : 2) Feeling down, depressed, or hopeless: Not at all (0) [PHQ-2 Negative - No further assessment needed] : PHQ-2 Negative - No further assessment needed [No Retinopathy] : No retinopathy [] : No [de-identified] : walking [de-identified] : balanced [SYT2Rszfb] : 0 [EyeExamDate] : 10/20 [AdvancecareDate] : 10/21 [FreeTextEntry4] : Provided HCP form, wife is HCP.

## 2021-10-29 NOTE — ASSESSMENT
[FreeTextEntry1] : 1) HCM Encouraged healthy meals and snacks, reduced food intake, and increased physical activity as tolerated for weight reduction/maintenance. Pneumovax and shingrix today.  Lab ordered as below.  Referral provided as below.  HCP form to be faxed by patient. Colonoscopy utd. \par \par 2) CKD- recent SHANA. Repeat kidney function today. Follow up with nephrologist. He has kidney atrophy with kidney stone. \par \par 3) AAA s/p repair - [facility] and cardiologist record to fax over to be scanned in. \par \par 4) HTN- c/w valsartan and metoprolol succinate. Monitor bp at home.

## 2021-10-29 NOTE — DATA REVIEWED
[FreeTextEntry1] : Reviewed previous labs and imaging. Updated patient's record. Discussed plan as below with patient.

## 2021-10-29 NOTE — HISTORY OF PRESENT ILLNESS
[FreeTextEntry1] : Establish care  [de-identified] : Mr. ELLIOTT CANCINO is a 75 year old man with pmhx of HTN, HLD, CKD, AAA w/ leak repair s/p repair, hx of lung nodules, hx of kidney stone with kidney atrophy who presents for annual wellness visit. He was recently in the hospital. He endorses he had right lower abdominal pain. He went to [facility] and had CT scan showing possible leak of aneurysm. He left AMA and went to [facility], and was found with no aneurysm.He is going for stress and echo by Dr. Koenig in [facility]. He is going to see nephrologist for contrast induced nephropathy. Atorvastatin was increased to 40mg daily and blood pressure medication was adjusted.

## 2021-11-05 ENCOUNTER — APPOINTMENT (OUTPATIENT)
Dept: NEPHROLOGY | Facility: CLINIC | Age: 76
End: 2021-11-05
Payer: MEDICARE

## 2021-11-05 VITALS
OXYGEN SATURATION: 96 % | HEART RATE: 62 BPM | SYSTOLIC BLOOD PRESSURE: 124 MMHG | TEMPERATURE: 97.2 F | BODY MASS INDEX: 33.43 KG/M2 | DIASTOLIC BLOOD PRESSURE: 80 MMHG | HEIGHT: 66 IN | WEIGHT: 208 LBS

## 2021-11-05 PROCEDURE — 99212 OFFICE O/P EST SF 10 MIN: CPT

## 2021-11-05 NOTE — REVIEW OF SYSTEMS
[Recent Weight Gain (___ Lbs)] : no recent weight gain [Recent Weight Loss (___ Lbs)] : no recent weight loss [Eyesight Problems] : eyesight problems [Loss Of Hearing] : hearing loss [Chest Pain] : no chest pain [Palpitations] : no palpitations [Lower Ext Edema] : lower extremity edema [SOB on Exertion] : shortness of breath during exertion [Constipation] : no constipation [Diarrhea] : no diarrhea [Heartburn] : no heartburn [Nocturia] : nocturia [Arthralgias] : arthralgias [Itching] : no itching [Dizziness] : dizziness [Fainting] : no fainting [Anxiety] : no anxiety [Depression] : no depression [Muscle Weakness] : no muscle weakness [Easy Bleeding] : a tendency for easy bleeding [FreeTextEntry9] : shoulder

## 2021-11-05 NOTE — HISTORY OF PRESENT ILLNESS
[FreeTextEntry1] : A case of SHANA on CKD with hypertension, hyperlipidemia, recently had undergone aortic stent placement and repair right femoral vein for better flow. BP is better controlled now. Patient has come for follow-up of CKD.

## 2021-11-05 NOTE — ASSESSMENT
[FreeTextEntry1] : A case of CKD with hypertension, hyperlipidemia complains of uncontrolled hypertension and increased frequency of urination. BP is controlled now. Patient had blood tests last week. Renal function stable. Electrolytes are within acceptable range. RTC 6 months.

## 2021-11-17 ENCOUNTER — OUTPATIENT (OUTPATIENT)
Dept: OUTPATIENT SERVICES | Facility: HOSPITAL | Age: 76
LOS: 1 days | End: 2021-11-17
Payer: MEDICARE

## 2021-11-17 VITALS
HEART RATE: 67 BPM | WEIGHT: 207.01 LBS | OXYGEN SATURATION: 96 % | SYSTOLIC BLOOD PRESSURE: 99 MMHG | DIASTOLIC BLOOD PRESSURE: 65 MMHG | TEMPERATURE: 98 F | HEIGHT: 66 IN | RESPIRATION RATE: 18 BRPM

## 2021-11-17 DIAGNOSIS — I71.4 ABDOMINAL AORTIC ANEURYSM, WITHOUT RUPTURE: ICD-10-CM

## 2021-11-17 DIAGNOSIS — J39.8 OTHER SPECIFIED DISEASES OF UPPER RESPIRATORY TRACT: ICD-10-CM

## 2021-11-17 DIAGNOSIS — Z01.818 ENCOUNTER FOR OTHER PREPROCEDURAL EXAMINATION: ICD-10-CM

## 2021-11-17 DIAGNOSIS — Z96.643 PRESENCE OF ARTIFICIAL HIP JOINT, BILATERAL: Chronic | ICD-10-CM

## 2021-11-17 DIAGNOSIS — Z98.890 OTHER SPECIFIED POSTPROCEDURAL STATES: Chronic | ICD-10-CM

## 2021-11-17 LAB
ANION GAP SERPL CALC-SCNC: 13 MMOL/L — SIGNIFICANT CHANGE UP (ref 5–17)
BLD GP AB SCN SERPL QL: NEGATIVE — SIGNIFICANT CHANGE UP
BUN SERPL-MCNC: 26 MG/DL — HIGH (ref 7–23)
CALCIUM SERPL-MCNC: 9.1 MG/DL — SIGNIFICANT CHANGE UP (ref 8.4–10.5)
CHLORIDE SERPL-SCNC: 101 MMOL/L — SIGNIFICANT CHANGE UP (ref 96–108)
CO2 SERPL-SCNC: 22 MMOL/L — SIGNIFICANT CHANGE UP (ref 22–31)
CREAT SERPL-MCNC: 1.77 MG/DL — HIGH (ref 0.5–1.3)
GLUCOSE SERPL-MCNC: 95 MG/DL — SIGNIFICANT CHANGE UP (ref 70–99)
HCT VFR BLD CALC: 38.9 % — LOW (ref 39–50)
HGB BLD-MCNC: 12.7 G/DL — LOW (ref 13–17)
MCHC RBC-ENTMCNC: 29.9 PG — SIGNIFICANT CHANGE UP (ref 27–34)
MCHC RBC-ENTMCNC: 32.6 GM/DL — SIGNIFICANT CHANGE UP (ref 32–36)
MCV RBC AUTO: 91.5 FL — SIGNIFICANT CHANGE UP (ref 80–100)
NRBC # BLD: 0 /100 WBCS — SIGNIFICANT CHANGE UP (ref 0–0)
PLATELET # BLD AUTO: 141 K/UL — LOW (ref 150–400)
POTASSIUM SERPL-MCNC: 4.2 MMOL/L — SIGNIFICANT CHANGE UP (ref 3.5–5.3)
POTASSIUM SERPL-SCNC: 4.2 MMOL/L — SIGNIFICANT CHANGE UP (ref 3.5–5.3)
RBC # BLD: 4.25 M/UL — SIGNIFICANT CHANGE UP (ref 4.2–5.8)
RBC # FLD: 13.2 % — SIGNIFICANT CHANGE UP (ref 10.3–14.5)
RH IG SCN BLD-IMP: POSITIVE — SIGNIFICANT CHANGE UP
SODIUM SERPL-SCNC: 136 MMOL/L — SIGNIFICANT CHANGE UP (ref 135–145)
WBC # BLD: 6.94 K/UL — SIGNIFICANT CHANGE UP (ref 3.8–10.5)
WBC # FLD AUTO: 6.94 K/UL — SIGNIFICANT CHANGE UP (ref 3.8–10.5)

## 2021-11-17 PROCEDURE — 80048 BASIC METABOLIC PNL TOTAL CA: CPT

## 2021-11-17 PROCEDURE — 86901 BLOOD TYPING SEROLOGIC RH(D): CPT

## 2021-11-17 PROCEDURE — G0463: CPT

## 2021-11-17 PROCEDURE — 86900 BLOOD TYPING SEROLOGIC ABO: CPT

## 2021-11-17 PROCEDURE — 86850 RBC ANTIBODY SCREEN: CPT

## 2021-11-17 PROCEDURE — 85027 COMPLETE CBC AUTOMATED: CPT

## 2021-11-17 RX ORDER — LIDOCAINE HCL 20 MG/ML
0.2 VIAL (ML) INJECTION ONCE
Refills: 0 | Status: DISCONTINUED | OUTPATIENT
Start: 2021-12-01 | End: 2021-12-01

## 2021-11-17 RX ORDER — LOSARTAN POTASSIUM 100 MG/1
1 TABLET, FILM COATED ORAL
Qty: 0 | Refills: 0 | DISCHARGE

## 2021-11-17 RX ORDER — CEFAZOLIN SODIUM 1 G
2000 VIAL (EA) INJECTION ONCE
Refills: 0 | Status: COMPLETED | OUTPATIENT
Start: 2021-12-01 | End: 2021-12-01

## 2021-11-17 RX ORDER — ATORVASTATIN CALCIUM 80 MG/1
1 TABLET, FILM COATED ORAL
Qty: 0 | Refills: 0 | DISCHARGE

## 2021-11-17 RX ORDER — CHLORHEXIDINE GLUCONATE 213 G/1000ML
1 SOLUTION TOPICAL ONCE
Refills: 0 | Status: DISCONTINUED | OUTPATIENT
Start: 2021-12-01 | End: 2021-12-01

## 2021-11-17 RX ORDER — SODIUM CHLORIDE 9 MG/ML
3 INJECTION INTRAMUSCULAR; INTRAVENOUS; SUBCUTANEOUS EVERY 8 HOURS
Refills: 0 | Status: DISCONTINUED | OUTPATIENT
Start: 2021-12-01 | End: 2021-12-01

## 2021-11-17 NOTE — H&P PST ADULT - ADMIT DATE
-Chronic diabetic neuropathy reported by pt, recent severe sensory loss in BLE above previous baseline.  -Hgb A1c 5.2%, vitamin B12 wnl. TSH low with normal FT4--appropriate for levothyroxine use.  -Vitamin B1 deficient, replace per above with 500 mg qd followed by 250 mg qd.  Discharge with thiamine 100 mg po qd.  -Peripheral neuropathy is improving per recent patient report, will continue to evaluate with continued IV Ig treatments and thiamine replacement.   17-Nov-2021

## 2021-11-17 NOTE — H&P PST ADULT - LAST CARDIAC ANGIOGRAM/IMAGING
had a cardiac catheterization at [place] a week ago due to dyspnea on exertion, abnormal stress test, no intervention

## 2021-11-17 NOTE — H&P PST ADULT - NSICDXPASTMEDICALHX_GEN_ALL_CORE_FT
PAST MEDICAL HISTORY:  AAA (abdominal aortic aneurysm)     Dyslipidemia     History of BPH     Hypertension     Iliac artery aneurysm, left last sonogram 3/2018; 3.5 cm    Nephrolithiasis     Right knee pain     Varicose veins

## 2021-11-17 NOTE — H&P PST ADULT - HISTORY OF PRESENT ILLNESS
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  Presents today for abdominal aortic aneurysm endoleak repair with right iliac limb percutaneous femoral exposure.

## 2021-11-17 NOTE — H&P PST ADULT - NSICDXPASTSURGICALHX_GEN_ALL_CORE_FT
PAST SURGICAL HISTORY:  H/O bilateral hip replacements right 2008  left 2012    S/P arthroscopy of knee

## 2021-11-17 NOTE — H&P PST ADULT - ATTENDING COMMENTS
Abdominal Pain, N/V/D 76 year old gentleman who has an AAA and is endovascular repair and later had an extension stent into the left external iliac artery. The aneurysm has continued to grow and the right limb is not sealed to the right iliac artery. It is barely in the artery.   PMH: tracheal stenosis, CKD stage 3, BPH, CAD and AAA.   He presents for an endoleak repair with right iliac limb extension to hopefully just the iliac bifurcation. Extension to the external iliac and a coil embolization of the internal iliac might be needed. The procedure, risks and alternatives were discussed and informed consent was obtained.

## 2021-11-18 PROBLEM — Z87.438 PERSONAL HISTORY OF OTHER DISEASES OF MALE GENITAL ORGANS: Chronic | Status: ACTIVE | Noted: 2021-11-17

## 2021-11-18 PROBLEM — I71.4 ABDOMINAL AORTIC ANEURYSM, WITHOUT RUPTURE: Chronic | Status: ACTIVE | Noted: 2018-04-12

## 2021-11-23 ENCOUNTER — APPOINTMENT (OUTPATIENT)
Dept: INTERNAL MEDICINE | Facility: CLINIC | Age: 76
End: 2021-11-23
Payer: MEDICARE

## 2021-11-23 VITALS
HEART RATE: 63 BPM | BODY MASS INDEX: 33.43 KG/M2 | OXYGEN SATURATION: 98 % | WEIGHT: 208 LBS | HEIGHT: 66 IN | TEMPERATURE: 96.8 F | SYSTOLIC BLOOD PRESSURE: 122 MMHG | DIASTOLIC BLOOD PRESSURE: 82 MMHG

## 2021-11-23 DIAGNOSIS — Z01.818 ENCOUNTER FOR OTHER PREPROCEDURAL EXAMINATION: ICD-10-CM

## 2021-11-23 PROCEDURE — 99214 OFFICE O/P EST MOD 30 MIN: CPT

## 2021-11-23 RX ORDER — VALSARTAN AND HYDROCHLOROTHIAZIDE 160; 12.5 MG/1; MG/1
160-12.5 TABLET, FILM COATED ORAL DAILY
Qty: 90 | Refills: 3 | Status: DISCONTINUED | COMMUNITY
Start: 2020-10-05 | End: 2021-11-23

## 2021-11-28 ENCOUNTER — OUTPATIENT (OUTPATIENT)
Dept: OUTPATIENT SERVICES | Facility: HOSPITAL | Age: 76
LOS: 1 days | End: 2021-11-28
Payer: MEDICARE

## 2021-11-28 DIAGNOSIS — Z11.52 ENCOUNTER FOR SCREENING FOR COVID-19: ICD-10-CM

## 2021-11-28 DIAGNOSIS — Z96.643 PRESENCE OF ARTIFICIAL HIP JOINT, BILATERAL: Chronic | ICD-10-CM

## 2021-11-28 DIAGNOSIS — Z98.890 OTHER SPECIFIED POSTPROCEDURAL STATES: Chronic | ICD-10-CM

## 2021-11-28 LAB — SARS-COV-2 RNA SPEC QL NAA+PROBE: SIGNIFICANT CHANGE UP

## 2021-11-28 PROCEDURE — C9803: CPT

## 2021-11-28 PROCEDURE — U0005: CPT

## 2021-11-28 PROCEDURE — U0003: CPT

## 2021-11-29 PROBLEM — Z01.818 PREOPERATIVE EXAMINATION: Status: ACTIVE | Noted: 2018-04-16

## 2021-11-29 NOTE — HISTORY OF PRESENT ILLNESS
[Aortic Stenosis] : aortic stenosis [Coronary Artery Disease] : coronary artery disease [Moderate (4-6 METs)] : Moderate (4-6 METs) [FreeTextEntry1] : Abdominal aortic repair [FreeTextEntry2] : 12/01/21 [FreeTextEntry3] : Dr. Chadwick Marquez  [FreeTextEntry4] : Mr. ELLIOTT CANCINO is a 75 year old man with pmhx of HTN, HLD, CKD,  AAA w/ leak repair s/p repair with multiple coils, hx of lung nodules, hx of kidney stone with kidney atrophy, CAD, who presents for annual wellness visit. He was recently in the hospital. He endorses he had right lower abdominal pain. He went to [facility] and had CT scan showing possible leak of aneurysm. His CT scan from 10/16/2021 showed aneurysm measuring 5.8cm. \par \par He had L heart cath a week ago at [facility]. Dr. Ermias Mendoza [phone], Report 11/12/21 showed moderate CAD of the proximal RCA and mid LCX. His stress test on 11/4/2021 showed an inferior septal wall hypokinesia. [facility]reon Dr. Chadwick Marquez  [phone]\par \par He had blood work 11/17/21 which showed anemia and low platelets. \par \par He has been off valsartan for the past two days as per cardiologist due to soft blood pressures and dizziness. He is agreeable on starting valsartan 80mg daily if blood pressure increases.

## 2021-11-29 NOTE — ASSESSMENT
[High Risk Surgery - Intraperitoneal, Intrathoracic or Supringuinal Vascular Procedures] : High Risk Surgery - Intraperitoneal, Intrathoracic or Supringuinal Vascular Procedures - Yes (1) [Ischemic Heart Disease] : Ischemic Heart Disease  - Yes (1) [Congestive Heart Failure] : Congestive Heart Failure - No (0) [Prior Cerebrovascular Accident or TIA] : Prior Cerebrovascular Accident or TIA - No (0) [Creatinine >= 2mg/dL (1 Point)] : Creatinine >= 2mg/dL - No (0) [Insulin-dependent Diabetic (1 Point)] : Insulin-dependent Diabetic - No (0) [2] : 2 , RCRI Class: III, Risk of Post-Op Cardiac Complications: 10.1%, 95% CI for Risk Estimate: 8.1% - 12.6% [Patient Optimized for Surgery] : Patient optimized for surgery [Cardiology consultation] : Cardiology consultation [As per surgery] : as per surgery [[email or web address]] : Patient is at increased risk for complications. He had a significant cardiac work up which will be scanned in to allscripts including stress testing and cardiac cath. He being medically managed for CAD. Cardiologist to make final determination to clear patient for surgery.

## 2021-11-29 NOTE — RESULTS/DATA
[] : results reviewed [de-identified] : See HIE.  [de-identified] : See HIE.  [de-identified] : Sinus Bradycardia HR 51, RBBB

## 2021-11-29 NOTE — PHYSICAL EXAM
[No Acute Distress] : no acute distress [Well Nourished] : well nourished [Well Developed] : well developed [Normal Sclera/Conjunctiva] : normal sclera/conjunctiva [EOMI] : extraocular movements intact [Normal Outer Ear/Nose] : the outer ears and nose were normal in appearance [No JVD] : no jugular venous distention [No Lymphadenopathy] : no lymphadenopathy [No Respiratory Distress] : no respiratory distress  [No Accessory Muscle Use] : no accessory muscle use [Clear to Auscultation] : lungs were clear to auscultation bilaterally [Normal Rate] : normal rate  [Regular Rhythm] : with a regular rhythm [Normal S1, S2] : normal S1 and S2 [No Edema] : there was no peripheral edema [Soft] : abdomen soft [Non Tender] : non-tender [Non-distended] : non-distended [Normal Bowel Sounds] : normal bowel sounds [Normal Posterior Cervical Nodes] : no posterior cervical lymphadenopathy [Normal Anterior Cervical Nodes] : no anterior cervical lymphadenopathy [No CVA Tenderness] : no CVA  tenderness [No Spinal Tenderness] : no spinal tenderness [No Joint Swelling] : no joint swelling [Grossly Normal Strength/Tone] : grossly normal strength/tone [No Rash] : no rash [Coordination Grossly Intact] : coordination grossly intact [No Focal Deficits] : no focal deficits [Normal Gait] : normal gait [Normal Affect] : the affect was normal [Normal Insight/Judgement] : insight and judgment were intact [Comprehensive Foot Exam Normal] : Right and left foot were examined and both feet are normal. No ulcers in either foot. Toes are normal and with full ROM.  Normal tactile sensation with monofilament testing throughout both feet

## 2021-11-30 ENCOUNTER — TRANSCRIPTION ENCOUNTER (OUTPATIENT)
Age: 76
End: 2021-11-30

## 2021-12-01 ENCOUNTER — INPATIENT (INPATIENT)
Facility: HOSPITAL | Age: 76
LOS: 9 days | Discharge: HOME HEALTH | DRG: 270 | End: 2021-12-11
Attending: SURGERY | Admitting: SURGERY
Payer: MEDICARE

## 2021-12-01 VITALS
OXYGEN SATURATION: 95 % | HEIGHT: 66 IN | WEIGHT: 207.01 LBS | TEMPERATURE: 98 F | DIASTOLIC BLOOD PRESSURE: 83 MMHG | HEART RATE: 68 BPM | RESPIRATION RATE: 18 BRPM | SYSTOLIC BLOOD PRESSURE: 141 MMHG

## 2021-12-01 DIAGNOSIS — Z98.890 OTHER SPECIFIED POSTPROCEDURAL STATES: Chronic | ICD-10-CM

## 2021-12-01 DIAGNOSIS — I71.4 ABDOMINAL AORTIC ANEURYSM, WITHOUT RUPTURE: ICD-10-CM

## 2021-12-01 DIAGNOSIS — Z96.643 PRESENCE OF ARTIFICIAL HIP JOINT, BILATERAL: Chronic | ICD-10-CM

## 2021-12-01 LAB
ALBUMIN SERPL ELPH-MCNC: 3.1 G/DL — LOW (ref 3.3–5)
ALP SERPL-CCNC: 42 U/L — SIGNIFICANT CHANGE UP (ref 40–120)
ALT FLD-CCNC: 10 U/L — SIGNIFICANT CHANGE UP (ref 10–45)
ANION GAP SERPL CALC-SCNC: 14 MMOL/L — SIGNIFICANT CHANGE UP (ref 5–17)
APTT BLD: 26.4 SEC — LOW (ref 27.5–35.5)
AST SERPL-CCNC: 15 U/L — SIGNIFICANT CHANGE UP (ref 10–40)
BASOPHILS # BLD AUTO: 0.02 K/UL — SIGNIFICANT CHANGE UP (ref 0–0.2)
BASOPHILS NFR BLD AUTO: 0.2 % — SIGNIFICANT CHANGE UP (ref 0–2)
BILIRUB SERPL-MCNC: 1 MG/DL — SIGNIFICANT CHANGE UP (ref 0.2–1.2)
BUN SERPL-MCNC: 21 MG/DL — SIGNIFICANT CHANGE UP (ref 7–23)
CALCIUM SERPL-MCNC: 8.1 MG/DL — LOW (ref 8.4–10.5)
CHLORIDE SERPL-SCNC: 106 MMOL/L — SIGNIFICANT CHANGE UP (ref 96–108)
CO2 SERPL-SCNC: 19 MMOL/L — LOW (ref 22–31)
CREAT SERPL-MCNC: 1.31 MG/DL — HIGH (ref 0.5–1.3)
EOSINOPHIL # BLD AUTO: 0.02 K/UL — SIGNIFICANT CHANGE UP (ref 0–0.5)
EOSINOPHIL NFR BLD AUTO: 0.2 % — SIGNIFICANT CHANGE UP (ref 0–6)
GAS PNL BLDA: SIGNIFICANT CHANGE UP
GLUCOSE SERPL-MCNC: 169 MG/DL — HIGH (ref 70–99)
HCT VFR BLD CALC: 34.6 % — LOW (ref 39–50)
HEPARINASE TEG R TIME: 4.6 MIN — SIGNIFICANT CHANGE UP (ref 4.3–8.3)
HGB BLD-MCNC: 11.5 G/DL — LOW (ref 13–17)
IMM GRANULOCYTES NFR BLD AUTO: 0.5 % — SIGNIFICANT CHANGE UP (ref 0–1.5)
INR BLD: 1.23 RATIO — HIGH (ref 0.88–1.16)
LYMPHOCYTES # BLD AUTO: 0.47 K/UL — LOW (ref 1–3.3)
LYMPHOCYTES # BLD AUTO: 3.9 % — LOW (ref 13–44)
MAGNESIUM SERPL-MCNC: 2.1 MG/DL — SIGNIFICANT CHANGE UP (ref 1.6–2.6)
MCHC RBC-ENTMCNC: 30.2 PG — SIGNIFICANT CHANGE UP (ref 27–34)
MCHC RBC-ENTMCNC: 33.2 GM/DL — SIGNIFICANT CHANGE UP (ref 32–36)
MCV RBC AUTO: 90.8 FL — SIGNIFICANT CHANGE UP (ref 80–100)
MONOCYTES # BLD AUTO: 0.23 K/UL — SIGNIFICANT CHANGE UP (ref 0–0.9)
MONOCYTES NFR BLD AUTO: 1.9 % — LOW (ref 2–14)
NEUTROPHILS # BLD AUTO: 11.15 K/UL — HIGH (ref 1.8–7.4)
NEUTROPHILS NFR BLD AUTO: 93.3 % — HIGH (ref 43–77)
NRBC # BLD: 0 /100 WBCS — SIGNIFICANT CHANGE UP (ref 0–0)
PHOSPHATE SERPL-MCNC: 3.6 MG/DL — SIGNIFICANT CHANGE UP (ref 2.5–4.5)
PLATELET # BLD AUTO: 118 K/UL — LOW (ref 150–400)
POTASSIUM SERPL-MCNC: 4.3 MMOL/L — SIGNIFICANT CHANGE UP (ref 3.5–5.3)
POTASSIUM SERPL-SCNC: 4.3 MMOL/L — SIGNIFICANT CHANGE UP (ref 3.5–5.3)
PROT SERPL-MCNC: 4.9 G/DL — LOW (ref 6–8.3)
PROTHROM AB SERPL-ACNC: 14.6 SEC — HIGH (ref 10.6–13.6)
RAPIDTEG MAXIMUM AMPLITUDE: <40 MM (ref 52–70)
RBC # BLD: 3.81 M/UL — LOW (ref 4.2–5.8)
RBC # FLD: 13.9 % — SIGNIFICANT CHANGE UP (ref 10.3–14.5)
SODIUM SERPL-SCNC: 139 MMOL/L — SIGNIFICANT CHANGE UP (ref 135–145)
TEG FUNCTIONAL FIBRINOGEN: 8.7 MM (ref 15–32)
TEG MAXIMUM AMPLITUDE: 43 MM (ref 52–69)
TEG REACTION TIME: 4.7 MIN — SIGNIFICANT CHANGE UP (ref 4.6–9.1)
WBC # BLD: 11.95 K/UL — HIGH (ref 3.8–10.5)
WBC # FLD AUTO: 11.95 K/UL — HIGH (ref 3.8–10.5)

## 2021-12-01 PROCEDURE — 71045 X-RAY EXAM CHEST 1 VIEW: CPT | Mod: 26

## 2021-12-01 RX ORDER — ASPIRIN/CALCIUM CARB/MAGNESIUM 324 MG
1 TABLET ORAL
Qty: 0 | Refills: 0 | DISCHARGE

## 2021-12-01 RX ORDER — FENTANYL CITRATE 50 UG/ML
50 INJECTION INTRAVENOUS
Refills: 0 | Status: DISCONTINUED | OUTPATIENT
Start: 2021-12-01 | End: 2021-12-02

## 2021-12-01 RX ORDER — SODIUM CHLORIDE 9 MG/ML
1000 INJECTION, SOLUTION INTRAVENOUS
Refills: 0 | Status: DISCONTINUED | OUTPATIENT
Start: 2021-12-01 | End: 2021-12-03

## 2021-12-01 RX ORDER — METOPROLOL TARTRATE 50 MG
1 TABLET ORAL
Qty: 0 | Refills: 0 | DISCHARGE

## 2021-12-01 RX ORDER — SODIUM CHLORIDE 9 MG/ML
500 INJECTION, SOLUTION INTRAVENOUS ONCE
Refills: 0 | Status: COMPLETED | OUTPATIENT
Start: 2021-12-01 | End: 2021-12-01

## 2021-12-01 RX ORDER — PROPOFOL 10 MG/ML
50 INJECTION, EMULSION INTRAVENOUS
Qty: 1000 | Refills: 0 | Status: DISCONTINUED | OUTPATIENT
Start: 2021-12-01 | End: 2021-12-02

## 2021-12-01 RX ORDER — CHLORHEXIDINE GLUCONATE 213 G/1000ML
15 SOLUTION TOPICAL EVERY 12 HOURS
Refills: 0 | Status: DISCONTINUED | OUTPATIENT
Start: 2021-12-01 | End: 2021-12-02

## 2021-12-01 RX ORDER — ACETAMINOPHEN 500 MG
1000 TABLET ORAL ONCE
Refills: 0 | Status: DISCONTINUED | OUTPATIENT
Start: 2021-12-01 | End: 2021-12-01

## 2021-12-01 RX ORDER — CHLORHEXIDINE GLUCONATE 213 G/1000ML
1 SOLUTION TOPICAL
Refills: 0 | Status: DISCONTINUED | OUTPATIENT
Start: 2021-12-01 | End: 2021-12-02

## 2021-12-01 RX ORDER — PHENYLEPHRINE HYDROCHLORIDE 10 MG/ML
0.6 INJECTION INTRAVENOUS
Qty: 40 | Refills: 0 | Status: DISCONTINUED | OUTPATIENT
Start: 2021-12-01 | End: 2021-12-02

## 2021-12-01 RX ORDER — SODIUM CHLORIDE 9 MG/ML
10 INJECTION INTRAMUSCULAR; INTRAVENOUS; SUBCUTANEOUS
Refills: 0 | Status: DISCONTINUED | OUTPATIENT
Start: 2021-12-01 | End: 2021-12-02

## 2021-12-01 RX ORDER — PANTOPRAZOLE SODIUM 20 MG/1
40 TABLET, DELAYED RELEASE ORAL DAILY
Refills: 0 | Status: DISCONTINUED | OUTPATIENT
Start: 2021-12-01 | End: 2021-12-02

## 2021-12-01 RX ORDER — ATORVASTATIN CALCIUM 80 MG/1
1 TABLET, FILM COATED ORAL
Qty: 0 | Refills: 0 | DISCHARGE

## 2021-12-01 RX ORDER — CEFAZOLIN SODIUM 1 G
2000 VIAL (EA) INJECTION EVERY 8 HOURS
Refills: 0 | Status: COMPLETED | OUTPATIENT
Start: 2021-12-01 | End: 2021-12-02

## 2021-12-01 RX ADMIN — CHLORHEXIDINE GLUCONATE 1 APPLICATION(S): 213 SOLUTION TOPICAL at 22:29

## 2021-12-01 RX ADMIN — Medication 100 MILLIGRAM(S): at 22:29

## 2021-12-01 RX ADMIN — CHLORHEXIDINE GLUCONATE 15 MILLILITER(S): 213 SOLUTION TOPICAL at 22:36

## 2021-12-01 RX ADMIN — PROPOFOL 28.2 MICROGRAM(S)/KG/MIN: 10 INJECTION, EMULSION INTRAVENOUS at 22:26

## 2021-12-01 RX ADMIN — SODIUM CHLORIDE 1000 MILLILITER(S): 9 INJECTION, SOLUTION INTRAVENOUS at 22:28

## 2021-12-01 RX ADMIN — SODIUM CHLORIDE 125 MILLILITER(S): 9 INJECTION, SOLUTION INTRAVENOUS at 22:28

## 2021-12-01 RX ADMIN — PANTOPRAZOLE SODIUM 40 MILLIGRAM(S): 20 TABLET, DELAYED RELEASE ORAL at 22:36

## 2021-12-01 RX ADMIN — PHENYLEPHRINE HYDROCHLORIDE 21.1 MICROGRAM(S)/KG/MIN: 10 INJECTION INTRAVENOUS at 22:26

## 2021-12-01 NOTE — PRE-ANESTHESIA EVALUATION ADULT - NSANTHPMHFT_GEN_ALL_CORE
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

## 2021-12-01 NOTE — PATIENT PROFILE ADULT - FALL HARM RISK - RISK INTERVENTIONS
Assistance OOB with selected safe patient handling equipment/Assistance with ambulation/Communicate Fall Risk and Risk Factors to all staff, patient, and family/Monitor gait and stability/Reinforce activity limits and safety measures with patient and family/Sit up slowly, dangle for a short time, stand at bedside before walking/Use of alarms - bed, chair and/or voice tab/Visual Cue: Yellow wristband/Bed in lowest position, wheels locked, appropriate side rails in place/Call bell, personal items and telephone in reach/Instruct patient to call for assistance before getting out of bed or chair/Non-slip footwear when patient is out of bed/[place] to call system/Physically safe environment - no spills, clutter or unnecessary equipment/Purposeful Proactive Rounding/Room/bathroom lighting operational, light cord in reach

## 2021-12-01 NOTE — CONSULT NOTE ADULT - SUBJECTIVE AND OBJECTIVE BOX
HISTORY OF PRESENT ILLNESS:  ELLIOTT CANCINO is a 76y Male PMH AAA, S/P repair with stent X 2, reports seeing a pulmonologist after surgery due to chronic cough, 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 Taken to OR with vascular for abdominal aortic aneurysm endoleak repair c/b perclose failure at end of case requiring right femoral cutdown and repair of right femoral artery and vein repair. EBL was 1L, patient resuscitated with 6 u pRBCs, 1u FFP, 1u PLTs, started on phenylephrine gtt intraoperatively and transferred to SICU for postop ventilator management and HD support/monitoring.    PAST MEDICAL HISTORY: HTN (hypertension)    Right knee pain    Hypertension    Dyslipidemia    AAA (abdominal aortic aneurysm)    Iliac artery aneurysm, left    Varicose veins    Nephrolithiasis    Tracheal Stenosis    History of BPH    PAST SURGICAL HISTORY: H/O bilateral hip replacements    AAA (abdominal aortic aneurysm)    S/P arthroscopy of knee      FAMILY HISTORY: No pertinent family history in first degree relatives    Family history of stomach cancer (Father)    SOCIAL HISTORY:  Former smoker, occasional EtOH use,  and lives with spouse.    CODE STATUS:   Full Code    HOME MEDICATIONS:  atorvastatin 40 mg oral tablet: Last Dose Taken:  , 1 tab(s) orally once a day  valsartan-hydrochlorothiazide 160 mg-12.5 mg oral tablet: Last Dose Taken:  , 1 tab(s) orally once a day  metoprolol succinate 50 mg oral tablet, extended release: Last Dose Taken:  , 1 tab(s) orally once a day  aspirin 81 mg oral tablet: Last Dose Taken:  , 1 tab(s) orally once a day  ALLERGIES: morphine (Vomiting; Pruritus)    VITAL SIGNS:  ICU Vital Signs Last 24 Hrs  T(C): 36.4 (01 Dec 2021 12:21), Max: 36.4 (01 Dec 2021 12:21)  T(F): 97.5 (01 Dec 2021 12:21), Max: 97.5 (01 Dec 2021 12:21)  HR: 61 (01 Dec 2021 19:20) (61 - 68)  BP: 130/83 (01 Dec 2021 19:20) (130/83 - 141/83)  BP(mean): 103 (01 Dec 2021 19:20) (103 - 103)  ABP: --  ABP(mean): --  RR: 18 (01 Dec 2021 19:20) (18 - 18)  SpO2: 100% (01 Dec 2021 19:20) (95% - 100%)      NEURO  Exam:  fentaNYL    Injectable 50 MICROGram(s) IV Push every 3 hours PRN mod/severe pain and agiation  propofol Infusion 50 MICROgram(s)/kG/Min IV Continuous <Continuous>      RESPIRATORY  Mechanical Ventilation:   ABG - ( 01 Dec 2021 17:35 )  pH: 7.30  /  pCO2: 38    /  pO2: 318   / HCO3: 19    / Base Excess: -7.1  /  SaO2: 99.7    Lactate: x      Exam:      CARDIOVASCULAR    Exam:  Cardiac Rhythm:  phenylephrine    Infusion 0.6 MICROgram(s)/kG/Min IV Continuous <Continuous>      GI/NUTRITION  Exam:  Diet:  pantoprazole  Injectable 40 milliGRAM(s) IV Push daily      GENITOURINARY/RENAL  lactated ringers. 1000 milliLiter(s) IV Continuous <Continuous>  sodium chloride 0.9% lock flush 10 milliLiter(s) IV Push every 1 hour PRN Pre/post blood products, medications, blood draw, and to maintain line patency      Weight (kg): 93.9 (12-01 @ 12:21)      [ ] Mojica catheter, indication: urine output monitoring in critically ill patient    HEMATOLOGIC  [ ] VTE Prophylaxis:    Transfusion: [ ] PRBC	[ ] Platelets	[ ] FFP	[ ] Cryoprecipitate      INFECTIOUS DISEASES  ceFAZolin   IVPB 2000 milliGRAM(s) IV Intermittent once  ceFAZolin   IVPB 2000 milliGRAM(s) IV Intermittent every 8 hours    RECENT CULTURES:      ENDOCRINE    CAPILLARY BLOOD GLUCOSE      PATIENT CARE ACCESS DEVICES:  [ ] Peripheral IV  [ ] Central Venous Line	[ ] R	[ ] L	[ ] IJ	[ ] Fem	[ ] SC	Placed:   [ ] Arterial Line		[ ] R	[ ] L	[ ] Fem	[ ] Rad	[ ] Ax	Placed:   [ ] PICC:					[ ] Mediport  [ ] Urinary Catheter, Date Placed:   [x] Necessity of urinary, arterial, and venous catheters discussed    OTHER MEDICATIONS: chlorhexidine 0.12% Liquid 15 milliLiter(s) Oral Mucosa every 12 hours  chlorhexidine 4% Liquid 1 Application(s) Topical <User Schedule>      IMAGING STUDIES: HISTORY OF PRESENT ILLNESS:  ELLIOTT CANCINO is a 76y Male PMH AAA, S/P repair with stent X 2, reports seeing a pulmonologist after surgery due to chronic cough, 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 Taken to OR with vascular for abdominal aortic aneurysm endoleak repair c/b perclose failure at end of case requiring right femoral cutdown and repair of right femoral artery and vein repair. EBL was 1L, patient resuscitated with 6 u pRBCs, 1u FFP, 1u PLTs, started on phenylephrine gtt intraoperatively and transferred to SICU for postop ventilator management and HD support/monitoring.    PAST MEDICAL HISTORY: HTN (hypertension)    Right knee pain    Hypertension    Dyslipidemia    AAA (abdominal aortic aneurysm)    Iliac artery aneurysm, left    Varicose veins    Nephrolithiasis    Tracheal Stenosis    History of BPH    PAST SURGICAL HISTORY: H/O bilateral hip replacements    AAA (abdominal aortic aneurysm)    S/P arthroscopy of knee      FAMILY HISTORY: No pertinent family history in first degree relatives    Family history of stomach cancer (Father)    SOCIAL HISTORY:  Former smoker, occasional EtOH use,  and lives with spouse.    CODE STATUS:   Full Code    HOME MEDICATIONS:  atorvastatin 40 mg oral tablet: Last Dose Taken:  , 1 tab(s) orally once a day  valsartan-hydrochlorothiazide 160 mg-12.5 mg oral tablet: Last Dose Taken:  , 1 tab(s) orally once a day  metoprolol succinate 50 mg oral tablet, extended release: Last Dose Taken:  , 1 tab(s) orally once a day  aspirin 81 mg oral tablet: Last Dose Taken:  , 1 tab(s) orally once a day  ALLERGIES: morphine (Vomiting; Pruritus)    VITAL SIGNS:  ICU Vital Signs Last 24 Hrs  T(C): 36.4 (01 Dec 2021 12:21), Max: 36.4 (01 Dec 2021 12:21)  T(F): 97.5 (01 Dec 2021 12:21), Max: 97.5 (01 Dec 2021 12:21)  HR: 61 (01 Dec 2021 19:20) (61 - 68)  BP: 130/83 (01 Dec 2021 19:20) (130/83 - 141/83)  BP(mean): 103 (01 Dec 2021 19:20) (103 - 103)  ABP: --  ABP(mean): --  RR: 18 (01 Dec 2021 19:20) (18 - 18)  SpO2: 100% (01 Dec 2021 19:20) (95% - 100%)      NEURO  Exam: sedated s/p OR, unresponsive to panful stimuli  fentaNYL    Injectable 50 MICROGram(s) IV Push every 3 hours PRN mod/severe pain and agiation  propofol Infusion 50 MICROgram(s)/kG/Min IV Continuous <Continuous>      RESPIRATORY  Mechanical Ventilation: 400/14/5/50  ABG - ( 01 Dec 2021 17:35 )  pH: 7.30  /  pCO2: 38    /  pO2: 318   / HCO3: 19    / Base Excess: -7.1  /  SaO2: 99.7    Lactate: x      Exam: CTAB      CARDIOVASCULAR  Exam: RRR, no murmurs rubs gallops, POCUS with limited windows 2/2 habitus but grossly normal LV function, A-line predominant b/l, IVC indeterminate.  Cardiac Rhythm: NSR  phenylephrine    Infusion 0.6 MICROgram(s)/kG/Min IV Continuous <Continuous>      GI/NUTRITION  Exam: soft, nontender, nondistended  Diet: NPO  pantoprazole  Injectable 40 milliGRAM(s) IV Push daily      GENITOURINARY/RENAL  lactated ringers. 1000 milliLiter(s) IV Continuous <Continuous>  sodium chloride 0.9% lock flush 10 milliLiter(s) IV Push every 1 hour PRN Pre/post blood products, medications, blood draw, and to maintain line patency  Weight (kg): 93.9 (12-01 @ 12:21)    [x] Mojica catheter, indication: urine output monitoring in critically ill patient    HEMATOLOGIC  [x] VTE Prophylaxis: Mechanical with SCDs    Transfusion: [x] PRBC	[ ] Platelets	[ ] FFP	[ ] Cryoprecipitate      INFECTIOUS DISEASES  ceFAZolin   IVPB 2000 milliGRAM(s) IV Intermittent once  ceFAZolin   IVPB 2000 milliGRAM(s) IV Intermittent every 8 hours    ENDOCRINE    CAPILLARY BLOOD GLUCOSE      PATIENT CARE ACCESS DEVICES:  [x] Peripheral IV  [x] Central Venous Line	[x] R	[ ] L	[x] IJ	[ ] Fem	[ ] SC	Placed:   [x] Arterial Line		[ ] R	[ ] L	[ ] Fem	[x] Rad	[ ] Ax	Placed:   [ ] PICC:					[ ] Mediport  [ ] Urinary Catheter, Date Placed:   [x] Necessity of urinary, arterial, and venous catheters discussed    OTHER MEDICATIONS: chlorhexidine 0.12% Liquid 15 milliLiter(s) Oral Mucosa every 12 hours  chlorhexidine 4% Liquid 1 Application(s) Topical <User Schedule>      IMAGING STUDIES: HISTORY OF PRESENT ILLNESS:  LELIOTT CANCINO is a 76y Male PMH, HTN, HLD, AAA, S/P repair with stent X 2, reports seeing a pulmonologist after surgery due to chronic cough, 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 Taken to OR with vascular for abdominal aortic aneurysm endoleak repair c/b perclose failure at end of case requiring right femoral cutdown and repair of right femoral artery and vein repair. EBL was 1L, patient resuscitated with 6 u pRBCs, 1u FFP, 1u PLTs, started on phenylephrine gtt intraoperatively and transferred to SICU for postop ventilator management and HD support/monitoring.    PAST MEDICAL HISTORY: HTN (hypertension)    Right knee pain    Hypertension    Dyslipidemia    AAA (abdominal aortic aneurysm)    Iliac artery aneurysm, left    Varicose veins    Nephrolithiasis    Tracheal Stenosis    History of BPH    PAST SURGICAL HISTORY: H/O bilateral hip replacements    AAA (abdominal aortic aneurysm)    S/P arthroscopy of knee      FAMILY HISTORY: No pertinent family history in first degree relatives    Family history of stomach cancer (Father)    SOCIAL HISTORY:  Former smoker, occasional EtOH use,  and lives with spouse.    CODE STATUS:   Full Code    HOME MEDICATIONS:  atorvastatin 40 mg oral tablet: Last Dose Taken:  , 1 tab(s) orally once a day  valsartan-hydrochlorothiazide 160 mg-12.5 mg oral tablet: Last Dose Taken:  , 1 tab(s) orally once a day  metoprolol succinate 50 mg oral tablet, extended release: Last Dose Taken:  , 1 tab(s) orally once a day  aspirin 81 mg oral tablet: Last Dose Taken:  , 1 tab(s) orally once a day  ALLERGIES: morphine (Vomiting; Pruritus)    VITAL SIGNS:  ICU Vital Signs Last 24 Hrs  T(C): 36.4 (01 Dec 2021 12:21), Max: 36.4 (01 Dec 2021 12:21)  T(F): 97.5 (01 Dec 2021 12:21), Max: 97.5 (01 Dec 2021 12:21)  HR: 61 (01 Dec 2021 19:20) (61 - 68)  BP: 130/83 (01 Dec 2021 19:20) (130/83 - 141/83)  BP(mean): 103 (01 Dec 2021 19:20) (103 - 103)  ABP: --  ABP(mean): --  RR: 18 (01 Dec 2021 19:20) (18 - 18)  SpO2: 100% (01 Dec 2021 19:20) (95% - 100%)      NEURO  Exam: sedated s/p OR, unresponsive to panful stimuli  fentaNYL    Injectable 50 MICROGram(s) IV Push every 3 hours PRN mod/severe pain and agiation  propofol Infusion 50 MICROgram(s)/kG/Min IV Continuous <Continuous>      RESPIRATORY  Mechanical Ventilation: 400/14/5/50  ABG - ( 01 Dec 2021 17:35 )  pH: 7.30  /  pCO2: 38    /  pO2: 318   / HCO3: 19    / Base Excess: -7.1  /  SaO2: 99.7    Lactate: x      Exam: CTAB      CARDIOVASCULAR  Exam: RRR, no murmurs rubs gallops, POCUS with limited windows 2/2 habitus but grossly normal LV function, A-line predominant b/l, IVC indeterminate.  Cardiac Rhythm: NSR  phenylephrine    Infusion 0.6 MICROgram(s)/kG/Min IV Continuous <Continuous>      GI/NUTRITION  Exam: soft, nontender, nondistended  Diet: NPO  pantoprazole  Injectable 40 milliGRAM(s) IV Push daily      GENITOURINARY/RENAL  lactated ringers. 1000 milliLiter(s) IV Continuous <Continuous>  sodium chloride 0.9% lock flush 10 milliLiter(s) IV Push every 1 hour PRN Pre/post blood products, medications, blood draw, and to maintain line patency  Weight (kg): 93.9 (12-01 @ 12:21)    [x] Mojica catheter, indication: urine output monitoring in critically ill patient    HEMATOLOGIC  [x] VTE Prophylaxis: Mechanical with SCDs    Transfusion: [x] PRBC	[ ] Platelets	[ ] FFP	[ ] Cryoprecipitate      INFECTIOUS DISEASES  ceFAZolin   IVPB 2000 milliGRAM(s) IV Intermittent once  ceFAZolin   IVPB 2000 milliGRAM(s) IV Intermittent every 8 hours    ENDOCRINE    CAPILLARY BLOOD GLUCOSE      PATIENT CARE ACCESS DEVICES:  [x] Peripheral IV  [x] Central Venous Line	[x] R	[ ] L	[x] IJ	[ ] Fem	[ ] SC	Placed:   [x] Arterial Line		[ ] R	[ ] L	[ ] Fem	[x] Rad	[ ] Ax	Placed:   [ ] PICC:					[ ] Mediport  [ ] Urinary Catheter, Date Placed:   [x] Necessity of urinary, arterial, and venous catheters discussed    OTHER MEDICATIONS: chlorhexidine 0.12% Liquid 15 milliLiter(s) Oral Mucosa every 12 hours  chlorhexidine 4% Liquid 1 Application(s) Topical <User Schedule>      IMAGING STUDIES:

## 2021-12-01 NOTE — PATIENT PROFILE ADULT - FALL HARM RISK - UNIVERSAL INTERVENTIONS
Bed in lowest position, wheels locked, appropriate side rails in place/Call bell, personal items and telephone in reach/Instruct patient to call for assistance before getting out of bed or chair/Non-slip footwear when patient is out of bed/[place] to call system/Physically safe environment - no spills, clutter or unnecessary equipment/Purposeful Proactive Rounding/Room/bathroom lighting operational, light cord in reach

## 2021-12-01 NOTE — PRE-ANESTHESIA EVALUATION ADULT - NSRADCARDRESULTSFT_GEN_ALL_CORE
CT chest 2018   Evaluation of the lung parenchyma demonstrate a 2 mm right lower lobe   peripheral pulmonary nodule unchanged. There is a 7 mm right lower lobe   pulmonary nodule also unchanged. There is a 2 mm calcified granuloma   within the superior segment of the right lower lobe unchanged. There is   no pneumonia. There are no new lung nodules. There are no central   endobronchial lesions. There is no evidence of tracheomalacia. There is   focal mild stenosis of the mid to upper trachea the diameter of which   measures about 1 cm in transverse dimension as measured on the coronal  images predominantly unchanged since December 22, 2016 which may be   sequela of prior intubation. The trachea immediately distal to this area   of focal narrowing measures about 1.7 cm unchanged.    Evaluation of the bones demonstrate no significant abnormality.    IMPRESSION: No evidence of tracheomalacia.    Focal mild narrowing of the mid to upper trachea is unchanged since   December 22, 2016.    Small right lung pulmonary nodules are unchanged.

## 2021-12-01 NOTE — CONSULT NOTE ADULT - ASSESSMENT
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 Taken to OR with vascular for abdominal aortic aneurysm endoleak repair c/b perclose failure at end of case requiring right femoral cutdown and repair of right femoral artery and vein repair. EBL was 1L, patient resuscitated with 6 u pRBCs, 1u FFP, 1u PLTs, started on phenylephrine gtt intraoperatively and transferred to SICU for postop ventilator management and HD support/monitoring.    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 Taken to OR with vascular for abdominal aortic aneurysm endoleak repair c/b perclose failure at end of case requiring right femoral cutdown and repair of right femoral artery and vein repair. EBL was 1L, patient resuscitated with 6 u pRBCs, 1u FFP, 1u PLTs, started on phenylephrine gtt intraoperatively and transferred to SICU for postop ventilator management and HD support/monitoring.    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 Taken to OR with vascular for abdominal aortic aneurysm endoleak repair c/b perclose failure at end of case requiring right femoral cutdown and repair of right femoral artery and vein repair. EBL was 1L, patient resuscitated with 6 u pRBCs, 1u FFP, 1u PLTs, started on phenylephrine gtt intraoperatively and transferred to SICU for postop ventilator management and HD support/monitoring.    PLAN:  NEURO:  - Sedated with propofol at 100 cc/hr on arrival from OR, weaning down as tolerated, consider transition to precedex for less vasoplegia  - pain control with IV tylenol, fent PRN  - wean as tolerated for SBT in AM    RESPIRATORY: H/o tracheal stenosis, intubated with 6.0 ETT in OR  Mechanically ventilated on settings 400/14/5/50  - CXR in AM  -wean vent settings as tolerated  - SBT in AM    CARDIOVASCULAR: Hemorrhagic shock, resolving s/p resuscitation in OR  - s/p AAA endoleak repair c/b intraop RIGHT fem art/vein injury with fem cutdown and repair  - resuscitated with 6 u pRBC, 1u FFP, 1 u PLT    GI/NUTRITION:    GENITOURINARY/RENAL:    HEMATOLOGIC:    INFECTIOUS DISEASE:    ENDOCRINE:    DISPO:     ASSESSMENT:  ELLIOTT CANCINO is a 76y Male PMH  HTN, HLD, AAA, S/P repair with stent X 2, reports seeing a pulmonologist after surgery due to chronic cough, 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 Taken to OR with vascular for abdominal aortic aneurysm endoleak repair c/b perclose failure at end of case requiring right femoral cutdown and repair of right femoral artery and vein repair. EBL was 1L, patient resuscitated with 6 u pRBCs, 1u FFP, 1u PLTs, started on phenylephrine gtt intraoperatively and transferred to SICU for postop ventilator management and HD support/monitoring.    PLAN:  NEURO:  - Sedated with propofol at 100 cc/hr on arrival from OR, weaning down as tolerated, consider transition to precedex for less vasoplegia  - pain control with IV tylenol, fent PRN  - wean as tolerated for SBT in AM    RESPIRATORY: H/o tracheal stenosis, intubated with 6.0 ETT in OR  Mechanically ventilated on settings 400/14/5/50  - CXR in AM  -wean vent settings as tolerated  - SBT in AM    CARDIOVASCULAR: Hemorrhagic shock, resolving s/p resuscitation in OR  - s/p AAA endoleak repair c/b intraop RIGHT fem art/vein injury with fem cutdown and repair  - resuscitated with 6 u pRBC, 1u FFP, 1 u PLT  - edy gtt weaned to 0.6 mcg/kg/hr, continue to wean  - POCUS on arrival with indeterminate IVC, grossly normal LV function but limited cardiac windows, will give additional 500 cc LR bolus     GI/NUTRITION:  NPO pending bowel function  NO active issues    GENITOURINARY/RENAL: SHANA on CKD, likely hemodynamically mediated  SCr downtrending  Mojica in place  mIVF qwith LR at 125 cc/hr  ctt UOP and SCr  electrolytes wnl    HEMATOLOGIC:  Holding chemoppx, SCDs in place  s/p multiple transfusions in OR  trend cbc q4h    INFECTIOUS DISEASE:  Periop ancef x24 hours    ENDOCRINE:  No active issues    DISPO:  SICU  full code

## 2021-12-02 LAB
ALBUMIN SERPL ELPH-MCNC: 2.9 G/DL — LOW (ref 3.3–5)
ALBUMIN SERPL ELPH-MCNC: 3.1 G/DL — LOW (ref 3.3–5)
ALP SERPL-CCNC: 38 U/L — LOW (ref 40–120)
ALP SERPL-CCNC: 41 U/L — SIGNIFICANT CHANGE UP (ref 40–120)
ALT FLD-CCNC: 10 U/L — SIGNIFICANT CHANGE UP (ref 10–45)
ALT FLD-CCNC: 7 U/L — LOW (ref 10–45)
ANION GAP SERPL CALC-SCNC: 12 MMOL/L — SIGNIFICANT CHANGE UP (ref 5–17)
ANION GAP SERPL CALC-SCNC: 13 MMOL/L — SIGNIFICANT CHANGE UP (ref 5–17)
AST SERPL-CCNC: 10 U/L — SIGNIFICANT CHANGE UP (ref 10–40)
AST SERPL-CCNC: 13 U/L — SIGNIFICANT CHANGE UP (ref 10–40)
BASOPHILS # BLD AUTO: 0.01 K/UL — SIGNIFICANT CHANGE UP (ref 0–0.2)
BASOPHILS # BLD AUTO: 0.01 K/UL — SIGNIFICANT CHANGE UP (ref 0–0.2)
BASOPHILS NFR BLD AUTO: 0.1 % — SIGNIFICANT CHANGE UP (ref 0–2)
BASOPHILS NFR BLD AUTO: 0.1 % — SIGNIFICANT CHANGE UP (ref 0–2)
BILIRUB SERPL-MCNC: 0.5 MG/DL — SIGNIFICANT CHANGE UP (ref 0.2–1.2)
BILIRUB SERPL-MCNC: 0.9 MG/DL — SIGNIFICANT CHANGE UP (ref 0.2–1.2)
BUN SERPL-MCNC: 22 MG/DL — SIGNIFICANT CHANGE UP (ref 7–23)
BUN SERPL-MCNC: 27 MG/DL — HIGH (ref 7–23)
CALCIUM SERPL-MCNC: 7.7 MG/DL — LOW (ref 8.4–10.5)
CALCIUM SERPL-MCNC: 8.3 MG/DL — LOW (ref 8.4–10.5)
CHLORIDE SERPL-SCNC: 106 MMOL/L — SIGNIFICANT CHANGE UP (ref 96–108)
CHLORIDE SERPL-SCNC: 106 MMOL/L — SIGNIFICANT CHANGE UP (ref 96–108)
CO2 SERPL-SCNC: 20 MMOL/L — LOW (ref 22–31)
CO2 SERPL-SCNC: 20 MMOL/L — LOW (ref 22–31)
CREAT SERPL-MCNC: 1.37 MG/DL — HIGH (ref 0.5–1.3)
CREAT SERPL-MCNC: 1.63 MG/DL — HIGH (ref 0.5–1.3)
EOSINOPHIL # BLD AUTO: 0 K/UL — SIGNIFICANT CHANGE UP (ref 0–0.5)
EOSINOPHIL # BLD AUTO: 0.16 K/UL — SIGNIFICANT CHANGE UP (ref 0–0.5)
EOSINOPHIL NFR BLD AUTO: 0 % — SIGNIFICANT CHANGE UP (ref 0–6)
EOSINOPHIL NFR BLD AUTO: 1.5 % — SIGNIFICANT CHANGE UP (ref 0–6)
GAS PNL BLDA: SIGNIFICANT CHANGE UP
GLUCOSE BLDC GLUCOMTR-MCNC: 124 MG/DL — HIGH (ref 70–99)
GLUCOSE BLDC GLUCOMTR-MCNC: 138 MG/DL — HIGH (ref 70–99)
GLUCOSE SERPL-MCNC: 129 MG/DL — HIGH (ref 70–99)
GLUCOSE SERPL-MCNC: 170 MG/DL — HIGH (ref 70–99)
HCT VFR BLD CALC: 28.5 % — LOW (ref 39–50)
HCT VFR BLD CALC: 29.3 % — LOW (ref 39–50)
HCT VFR BLD CALC: 31.3 % — LOW (ref 39–50)
HCT VFR BLD CALC: 32.7 % — LOW (ref 39–50)
HGB BLD-MCNC: 10.8 G/DL — LOW (ref 13–17)
HGB BLD-MCNC: 11.1 G/DL — LOW (ref 13–17)
HGB BLD-MCNC: 9.5 G/DL — LOW (ref 13–17)
HGB BLD-MCNC: 9.8 G/DL — LOW (ref 13–17)
IMM GRANULOCYTES NFR BLD AUTO: 0.4 % — SIGNIFICANT CHANGE UP (ref 0–1.5)
IMM GRANULOCYTES NFR BLD AUTO: 0.5 % — SIGNIFICANT CHANGE UP (ref 0–1.5)
LYMPHOCYTES # BLD AUTO: 0.34 K/UL — LOW (ref 1–3.3)
LYMPHOCYTES # BLD AUTO: 0.51 K/UL — LOW (ref 1–3.3)
LYMPHOCYTES # BLD AUTO: 3.1 % — LOW (ref 13–44)
LYMPHOCYTES # BLD AUTO: 4.8 % — LOW (ref 13–44)
MAGNESIUM SERPL-MCNC: 2 MG/DL — SIGNIFICANT CHANGE UP (ref 1.6–2.6)
MAGNESIUM SERPL-MCNC: 2 MG/DL — SIGNIFICANT CHANGE UP (ref 1.6–2.6)
MCHC RBC-ENTMCNC: 30.3 PG — SIGNIFICANT CHANGE UP (ref 27–34)
MCHC RBC-ENTMCNC: 30.4 PG — SIGNIFICANT CHANGE UP (ref 27–34)
MCHC RBC-ENTMCNC: 30.6 PG — SIGNIFICANT CHANGE UP (ref 27–34)
MCHC RBC-ENTMCNC: 30.9 PG — SIGNIFICANT CHANGE UP (ref 27–34)
MCHC RBC-ENTMCNC: 33.3 GM/DL — SIGNIFICANT CHANGE UP (ref 32–36)
MCHC RBC-ENTMCNC: 33.4 GM/DL — SIGNIFICANT CHANGE UP (ref 32–36)
MCHC RBC-ENTMCNC: 33.9 GM/DL — SIGNIFICANT CHANGE UP (ref 32–36)
MCHC RBC-ENTMCNC: 34.5 GM/DL — SIGNIFICANT CHANGE UP (ref 32–36)
MCV RBC AUTO: 89.4 FL — SIGNIFICANT CHANGE UP (ref 80–100)
MCV RBC AUTO: 90.1 FL — SIGNIFICANT CHANGE UP (ref 80–100)
MCV RBC AUTO: 90.7 FL — SIGNIFICANT CHANGE UP (ref 80–100)
MCV RBC AUTO: 91.3 FL — SIGNIFICANT CHANGE UP (ref 80–100)
MONOCYTES # BLD AUTO: 0.54 K/UL — SIGNIFICANT CHANGE UP (ref 0–0.9)
MONOCYTES # BLD AUTO: 0.79 K/UL — SIGNIFICANT CHANGE UP (ref 0–0.9)
MONOCYTES NFR BLD AUTO: 5 % — SIGNIFICANT CHANGE UP (ref 2–14)
MONOCYTES NFR BLD AUTO: 7.5 % — SIGNIFICANT CHANGE UP (ref 2–14)
NEUTROPHILS # BLD AUTO: 9.16 K/UL — HIGH (ref 1.8–7.4)
NEUTROPHILS # BLD AUTO: 9.72 K/UL — HIGH (ref 1.8–7.4)
NEUTROPHILS NFR BLD AUTO: 87.1 % — HIGH (ref 43–77)
NEUTROPHILS NFR BLD AUTO: 89.9 % — HIGH (ref 43–77)
NRBC # BLD: 0 /100 WBCS — SIGNIFICANT CHANGE UP (ref 0–0)
PHOSPHATE SERPL-MCNC: 4.3 MG/DL — SIGNIFICANT CHANGE UP (ref 2.5–4.5)
PHOSPHATE SERPL-MCNC: 4.4 MG/DL — SIGNIFICANT CHANGE UP (ref 2.5–4.5)
PLATELET # BLD AUTO: 102 K/UL — LOW (ref 150–400)
PLATELET # BLD AUTO: 108 K/UL — LOW (ref 150–400)
PLATELET # BLD AUTO: 119 K/UL — LOW (ref 150–400)
PLATELET # BLD AUTO: 90 K/UL — LOW (ref 150–400)
POTASSIUM SERPL-MCNC: 4.3 MMOL/L — SIGNIFICANT CHANGE UP (ref 3.5–5.3)
POTASSIUM SERPL-MCNC: 4.4 MMOL/L — SIGNIFICANT CHANGE UP (ref 3.5–5.3)
POTASSIUM SERPL-SCNC: 4.3 MMOL/L — SIGNIFICANT CHANGE UP (ref 3.5–5.3)
POTASSIUM SERPL-SCNC: 4.4 MMOL/L — SIGNIFICANT CHANGE UP (ref 3.5–5.3)
PROT SERPL-MCNC: 4.9 G/DL — LOW (ref 6–8.3)
PROT SERPL-MCNC: 5 G/DL — LOW (ref 6–8.3)
RBC # BLD: 3.12 M/UL — LOW (ref 4.2–5.8)
RBC # BLD: 3.23 M/UL — LOW (ref 4.2–5.8)
RBC # BLD: 3.5 M/UL — LOW (ref 4.2–5.8)
RBC # BLD: 3.63 M/UL — LOW (ref 4.2–5.8)
RBC # FLD: 14 % — SIGNIFICANT CHANGE UP (ref 10.3–14.5)
RBC # FLD: 14.6 % — HIGH (ref 10.3–14.5)
RBC # FLD: 14.7 % — HIGH (ref 10.3–14.5)
RBC # FLD: 14.7 % — HIGH (ref 10.3–14.5)
SODIUM SERPL-SCNC: 138 MMOL/L — SIGNIFICANT CHANGE UP (ref 135–145)
SODIUM SERPL-SCNC: 139 MMOL/L — SIGNIFICANT CHANGE UP (ref 135–145)
WBC # BLD: 10.52 K/UL — HIGH (ref 3.8–10.5)
WBC # BLD: 10.81 K/UL — HIGH (ref 3.8–10.5)
WBC # BLD: 11.11 K/UL — HIGH (ref 3.8–10.5)
WBC # BLD: 9.96 K/UL — SIGNIFICANT CHANGE UP (ref 3.8–10.5)
WBC # FLD AUTO: 10.52 K/UL — HIGH (ref 3.8–10.5)
WBC # FLD AUTO: 10.81 K/UL — HIGH (ref 3.8–10.5)
WBC # FLD AUTO: 11.11 K/UL — HIGH (ref 3.8–10.5)
WBC # FLD AUTO: 9.96 K/UL — SIGNIFICANT CHANGE UP (ref 3.8–10.5)

## 2021-12-02 PROCEDURE — 71045 X-RAY EXAM CHEST 1 VIEW: CPT | Mod: 26

## 2021-12-02 PROCEDURE — 99223 1ST HOSP IP/OBS HIGH 75: CPT

## 2021-12-02 RX ORDER — HYDROMORPHONE HYDROCHLORIDE 2 MG/ML
0.5 INJECTION INTRAMUSCULAR; INTRAVENOUS; SUBCUTANEOUS ONCE
Refills: 0 | Status: DISCONTINUED | OUTPATIENT
Start: 2021-12-02 | End: 2021-12-02

## 2021-12-02 RX ORDER — DEXMEDETOMIDINE HYDROCHLORIDE IN 0.9% SODIUM CHLORIDE 4 UG/ML
1 INJECTION INTRAVENOUS
Qty: 200 | Refills: 0 | Status: DISCONTINUED | OUTPATIENT
Start: 2021-12-02 | End: 2021-12-02

## 2021-12-02 RX ORDER — ONDANSETRON 8 MG/1
4 TABLET, FILM COATED ORAL ONCE
Refills: 0 | Status: COMPLETED | OUTPATIENT
Start: 2021-12-02 | End: 2021-12-02

## 2021-12-02 RX ORDER — INSULIN LISPRO 100/ML
VIAL (ML) SUBCUTANEOUS EVERY 6 HOURS
Refills: 0 | Status: DISCONTINUED | OUTPATIENT
Start: 2021-12-02 | End: 2021-12-04

## 2021-12-02 RX ORDER — SODIUM CHLORIDE 9 MG/ML
1000 INJECTION, SOLUTION INTRAVENOUS ONCE
Refills: 0 | Status: COMPLETED | OUTPATIENT
Start: 2021-12-02 | End: 2021-12-02

## 2021-12-02 RX ORDER — HYDROMORPHONE HYDROCHLORIDE 2 MG/ML
0.5 INJECTION INTRAMUSCULAR; INTRAVENOUS; SUBCUTANEOUS
Refills: 0 | Status: DISCONTINUED | OUTPATIENT
Start: 2021-12-02 | End: 2021-12-02

## 2021-12-02 RX ORDER — TRAMADOL HYDROCHLORIDE 50 MG/1
25 TABLET ORAL EVERY 6 HOURS
Refills: 0 | Status: DISCONTINUED | OUTPATIENT
Start: 2021-12-02 | End: 2021-12-08

## 2021-12-02 RX ORDER — ACETAMINOPHEN 500 MG
500 TABLET ORAL EVERY 6 HOURS
Refills: 0 | Status: DISCONTINUED | OUTPATIENT
Start: 2021-12-02 | End: 2021-12-03

## 2021-12-02 RX ORDER — CHLORHEXIDINE GLUCONATE 213 G/1000ML
1 SOLUTION TOPICAL
Refills: 0 | Status: DISCONTINUED | OUTPATIENT
Start: 2021-12-03 | End: 2021-12-06

## 2021-12-02 RX ORDER — ATORVASTATIN CALCIUM 80 MG/1
40 TABLET, FILM COATED ORAL AT BEDTIME
Refills: 0 | Status: DISCONTINUED | OUTPATIENT
Start: 2021-12-02 | End: 2021-12-08

## 2021-12-02 RX ORDER — IPRATROPIUM/ALBUTEROL SULFATE 18-103MCG
3 AEROSOL WITH ADAPTER (GRAM) INHALATION EVERY 6 HOURS
Refills: 0 | Status: DISCONTINUED | OUTPATIENT
Start: 2021-12-02 | End: 2021-12-08

## 2021-12-02 RX ORDER — DEXMEDETOMIDINE HYDROCHLORIDE IN 0.9% SODIUM CHLORIDE 4 UG/ML
0.5 INJECTION INTRAVENOUS
Qty: 200 | Refills: 0 | Status: DISCONTINUED | OUTPATIENT
Start: 2021-12-02 | End: 2021-12-02

## 2021-12-02 RX ADMIN — ONDANSETRON 4 MILLIGRAM(S): 8 TABLET, FILM COATED ORAL at 17:52

## 2021-12-02 RX ADMIN — Medication 3 MILLILITER(S): at 17:36

## 2021-12-02 RX ADMIN — Medication 3 MILLILITER(S): at 12:32

## 2021-12-02 RX ADMIN — HYDROMORPHONE HYDROCHLORIDE 0.5 MILLIGRAM(S): 2 INJECTION INTRAMUSCULAR; INTRAVENOUS; SUBCUTANEOUS at 14:19

## 2021-12-02 RX ADMIN — CHLORHEXIDINE GLUCONATE 1 APPLICATION(S): 213 SOLUTION TOPICAL at 05:57

## 2021-12-02 RX ADMIN — HYDROMORPHONE HYDROCHLORIDE 0.5 MILLIGRAM(S): 2 INJECTION INTRAMUSCULAR; INTRAVENOUS; SUBCUTANEOUS at 23:55

## 2021-12-02 RX ADMIN — HYDROMORPHONE HYDROCHLORIDE 0.5 MILLIGRAM(S): 2 INJECTION INTRAMUSCULAR; INTRAVENOUS; SUBCUTANEOUS at 10:20

## 2021-12-02 RX ADMIN — PANTOPRAZOLE SODIUM 40 MILLIGRAM(S): 20 TABLET, DELAYED RELEASE ORAL at 11:28

## 2021-12-02 RX ADMIN — HYDROMORPHONE HYDROCHLORIDE 0.5 MILLIGRAM(S): 2 INJECTION INTRAMUSCULAR; INTRAVENOUS; SUBCUTANEOUS at 10:06

## 2021-12-02 RX ADMIN — CHLORHEXIDINE GLUCONATE 15 MILLILITER(S): 213 SOLUTION TOPICAL at 05:57

## 2021-12-02 RX ADMIN — PROPOFOL 28.2 MICROGRAM(S)/KG/MIN: 10 INJECTION, EMULSION INTRAVENOUS at 09:08

## 2021-12-02 RX ADMIN — ONDANSETRON 4 MILLIGRAM(S): 8 TABLET, FILM COATED ORAL at 05:56

## 2021-12-02 RX ADMIN — Medication 100 MILLIGRAM(S): at 05:56

## 2021-12-02 RX ADMIN — Medication 100 MILLIGRAM(S): at 14:07

## 2021-12-02 RX ADMIN — Medication 3 MILLILITER(S): at 23:37

## 2021-12-02 RX ADMIN — HYDROMORPHONE HYDROCHLORIDE 0.5 MILLIGRAM(S): 2 INJECTION INTRAMUSCULAR; INTRAVENOUS; SUBCUTANEOUS at 14:35

## 2021-12-02 RX ADMIN — ATORVASTATIN CALCIUM 40 MILLIGRAM(S): 80 TABLET, FILM COATED ORAL at 23:00

## 2021-12-02 RX ADMIN — SODIUM CHLORIDE 4000 MILLILITER(S): 9 INJECTION, SOLUTION INTRAVENOUS at 07:22

## 2021-12-02 NOTE — PROGRESS NOTE ADULT - ASSESSMENT
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 Taken to OR with vascular for abdominal aortic aneurysm endoleak repair c/b perclose failure at end of case requiring right femoral cutdown and repair of right femoral artery and vein repair. EBL was 1L, patient resuscitated with 6 u pRBCs, 1u FFP, 1u PLTs, started on phenylephrine gtt intraoperatively and transferred to SICU for postop ventilator management and hemodynamic support/monitoring.    PLAN:  NEURO:  - Sedated with propofol gtt weaning down as tolerated, consider transition to precedex for less vasoplegia  - pain control with IV tylenol, fent PRN  - wean as tolerated for SBT in AM    RESPIRATORY: H/o tracheal stenosis, intubated with 6.0 ETT in OR  Mechanically ventilated on settings 400/14/5/40  - CXR in AM  -wean vent settings as tolerated  - SBT in AM    CARDIOVASCULAR: Hemorrhagic shock, resolving s/p resuscitation in OR  - s/p AAA endoleak repair c/b intraop RIGHT fem art/vein injury with fem cutdown and repair  - resuscitated with 6 u pRBC, 1u FFP, 1 u PLT  - edy gtt weaned to 0.6 mcg/kg/hr, continue to wean  - POCUS on arrival with indeterminate IVC, grossly normal LV function but limited cardiac windows, will give additional 500 cc LR bolus     GI/NUTRITION:  NPO pending bowel function  NO active issues    GENITOURINARY/RENAL: SHANA on CKD, likely hemodynamically mediated  SCr downtrending  Mojica in place  mIVF qwith LR at 125 cc/hr  ctt UOP and SCr  electrolytes wnl    HEMATOLOGIC:  Holding chemoppx, SCDs in place  s/p multiple transfusions in OR  trend cbc q4h    INFECTIOUS DISEASE:  Periop ancef x24 hours    ENDOCRINE:  No active issues    DISPO:  SICU  full code     ASSESSMENT:  ELLIOTT CANCINO is a 76y Male PMH  HTN, HLD, AAA, S/P repair with stent X 2, reports seeing a pulmonologist after surgery due to chronic cough, 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 Taken to OR with vascular for abdominal aortic aneurysm endoleak repair c/b perclose failure at end of case requiring right femoral cutdown and repair of right femoral artery and vein repair. EBL was 1L, patient resuscitated with 6 u pRBCs, 1u FFP, 1u PLTs, started on phenylephrine gtt intraoperatively and transferred to SICU for postop ventilator management and hemodynamic support/monitoring.    PLAN:  NEURO:  - Sedated with propofol gtt weaning down as tolerated, consider transition to precedex for less vasoplegia  - pain control with IV tylenol, fent PRN  - wean as tolerated for SBT in AM    RESPIRATORY: H/o tracheal stenosis, intubated with 6.0 ETT in OR  Mechanically ventilated on settings 400/14/5/40  - CXR in AM  -wean vent settings as tolerated  - SBT in AM    CARDIOVASCULAR: Hemorrhagic shock, resolving s/p resuscitation in OR  - s/p AAA endoleak repair c/b intraop RIGHT fem art/vein injury with fem cutdown and repair  - resuscitated with 6 u pRBC, 1u FFP, 1 u PLT  - edy gtt weaned to 0.6 mcg/kg/hr, continue to wean  - POCUS on arrival with indeterminate IVC, grossly normal LV function but limited cardiac windows, will give additional 500 cc LR bolus     GI/NUTRITION:  NPO pending bowel function  OGT to suction  protonix qD    GENITOURINARY/RENAL: SHANA on CKD, likely hemodynamically mediated  SCr downtrending  Mojica in place  mIVF qwith LR at 125 cc/hr  ctt UOP and SCr  electrolytes wnl    HEMATOLOGIC:  Holding chemoppx, SCDs in place  s/p multiple transfusions in OR  trend cbc q4h    INFECTIOUS DISEASE:  Periop ancef x24 hours    ENDOCRINE:  No active issues    DISPO:  SICU  full code

## 2021-12-02 NOTE — AIRWAY REMOVAL NOTE  ADULT & PEDS - ARTIFICAL AIRWAY REMOVAL COMMENTS
written order for extubation verified. The patient was identified by full name and birth date compared to the identification band.  Present during the procedure was RADHA Hopkins

## 2021-12-02 NOTE — PROGRESS NOTE ADULT - ATTENDING COMMENTS
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 Taken to OR with vascular for abdominal aortic aneurysm endoleak repair c/b perclose failure at end of case requiring right femoral cutdown and repair of right femoral artery and vein repair. EBL was 1L, patient resuscitated with 6 u pRBCs, 1u FFP, 1u PLTs, started on phenylephrine gtt intraoperatively.    Sedated with high dose Propofol. Became very agitated on lowering down Propofol. Started on Precedex drip gtt. Change Fentanyl drip to prn Dilaudid.  Acute resp insufficiency post op. H/o tracheal stenosis. SBT, check cuff leak. CXR mild pul vascular congestion.  Hypotensive SIRS, vasoparetic partially from high dose sedation  NPO, PPI  HCT have been stable  ISS  Vascular checks  Mechanical DVT ppx  Spoke to wife son  Discussed with vascular surgery

## 2021-12-02 NOTE — DIETITIAN INITIAL EVALUATION ADULT. - ADD RECOMMEND
1. Obtain further subjective wt/diet history from pt/family PRN. 2. Recommend multivitamin (if no medication contraindications) to aid in prevention of micronutrient deficiencies. 3. Monitor wt trends/labs/skin/hydration status/bowel regularity.

## 2021-12-02 NOTE — DIETITIAN INITIAL EVALUATION ADULT. - PERTINENT LABORATORY DATA
(12/2): Hgb 9.8, Hct 29.3, BUN 27, Cr 1.63, Glu 129, Ca 7.7, Total Protein 4.9, Alb 2.9, Alk Phos 38, ALT 7, GFR 41, POCT Blood Glucose 124; Blood Gas: Glu 128, Na 135, Hgb 10.0

## 2021-12-02 NOTE — DIETITIAN INITIAL EVALUATION ADULT. - ORAL INTAKE PTA/DIET HISTORY
Pt intubated/sedated; unable to participate in nutrition evaluation. Baseline tolerance to chewing/swallowing and baseline provision of energy/protein intake unclear. No documented food allergies. No indication of prior vitamin/supplement use PTA.

## 2021-12-02 NOTE — DIETITIAN INITIAL EVALUATION ADULT. - ENTERAL
If EN feeds warranted, consider trickle feeds of Vital 1.5. at 10ml/hr. Increase as tolerated to goal rate 50ml/hr x 24 hrs + No Carb Prosource 1x daily (+40kcal, +11g protein/serving). To provide (based on dosing wt 93.9kg): 1200ml, 1840kcal (19.6kcal/kg) and (based on IBW 64.5kg) provides 92g protein (1.4g protein/kg).

## 2021-12-02 NOTE — DIETITIAN INITIAL EVALUATION ADULT. - PERTINENT MEDS FT
Ancef, Peridex, Hibiclens, Lactated Ringers Bolus/Continuous, Insulin Lispro, Precedex, Dilaudid, Protonix, Phenylephrine

## 2021-12-02 NOTE — PROGRESS NOTE ADULT - SUBJECTIVE AND OBJECTIVE BOX
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 Taken to OR with vascular for abdominal aortic aneurysm endoleak repair c/b perclose failure at end of case requiring right femoral cutdown and repair of right femoral artery and vein repair. EBL was 1L, patient resuscitated with 6 u pRBCs, 1u FFP, 1u PLTs, started on phenylephrine gtt intraoperatively and transferred to SICU for postop ventilator management and HD support/monitoring.      SUBJECTIVE/ROS:   Patient seen and examined at bedside.  Patient intubated, sedated  Received 500cc bolus in SICU, on edy gtt at 0.6        OBJECTIVE:    Vital Signs Last 24 Hrs  T(C): 36.7 (01 Dec 2021 23:00), Max: 42.9 (01 Dec 2021 19:20)  T(F): 100.4 (01 Dec 2021 23:00), Max: 109.2 (01 Dec 2021 19:20)  HR: 81 (02 Dec 2021 02:00) (61 - 82)  BP: 93/61 (02 Dec 2021 02:00) (88/59 - 141/83)  BP(mean): 72 (02 Dec 2021 02:00) (67 - 103)  RR: 22 (02 Dec 2021 02:00) (15 - 24)  SpO2: 98% (02 Dec 2021 02:00) (95% - 100%)    PHYSICAL EXAM:  Constitutional:  sedated, unresponsive to voice  Respiratory: intubated  Extremities:  Right/Left + DP/PT signals      I&Os:  I&O's Detail    01 Dec 2021 07:01  -  02 Dec 2021 02:41  --------------------------------------------------------  IN:    IV PiggyBack: 50 mL    Lactated Ringers: 875 mL    Lactated Ringers Bolus: 500 mL    Phenylephrine: 227.1 mL    Propofol: 171.9 mL  Total IN: 1824 mL    OUT:    Indwelling Catheter - Urethral (mL): 400 mL  Total OUT: 400 mL    Total NET: 1424 mL          LABS:                        11.1   11.11 )-----------( 119      ( 02 Dec 2021 00:17 )             32.7     12-02    138  |  106  |  22  ----------------------------<  170<H>  4.3   |  20<L>  |  1.37<H>    Ca    8.3<L>      02 Dec 2021 00:17  Phos  4.3     12-02  Mg     2.0     12-02    TPro  5.0<L>  /  Alb  3.1<L>  /  TBili  0.9  /  DBili  x   /  AST  13  /  ALT  10  /  AlkPhos  41  12-02    PT/INR - ( 01 Dec 2021 20:26 )   PT: 14.6 sec;   INR: 1.23 ratio         PTT - ( 01 Dec 2021 20:26 )  PTT:26.4 sec        ASSESSMENT   Patient is a 76y y/o Male POD1 s/p abdominal aortic aneurysm endoleak repair c/b perclose failure at end of case requiring right femoral cutdown and repair of right femoral artery and vein repair. Patient intubated, sedated in SICU.    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 Taken to OR with vascular for abdominal aortic aneurysm endoleak repair c/b perclose failure at end of case requiring right femoral cutdown and repair of right femoral artery and vein repair. EBL was 1L, patient resuscitated with 6 u pRBCs, 1u FFP, 1u PLTs, started on phenylephrine gtt intraoperatively and transferred to SICU for postop ventilator management and HD support/monitoring.      SUBJECTIVE/ROS:   Patient seen and examined at bedside. Patient intubated, sedated.     OBJECTIVE:  Vital Signs Last 24 Hrs  T(C): 36.8 (02 Dec 2021 07:00), Max: 42.9 (01 Dec 2021 19:20)  T(F): 98.2 (02 Dec 2021 07:00), Max: 109.2 (01 Dec 2021 19:20)  HR: 80 (02 Dec 2021 08:00) (61 - 85)  BP: 94/61 (02 Dec 2021 04:00) (88/59 - 141/83)  BP(mean): 73 (02 Dec 2021 04:00) (67 - 103)  RR: 21 (02 Dec 2021 08:00) (15 - 30)  SpO2: 93% (02 Dec 2021 08:00) (92% - 100%)    PHYSICAL EXAM:  Constitutional:  sedated, unresponsive to voice  Respiratory: intubated  Extremities:  Right/Left + DP/PT signals      I&O's Detail    01 Dec 2021 07:01  -  02 Dec 2021 07:00  --------------------------------------------------------  IN:    Enteral Tube Flush: 50 mL    IV PiggyBack: 100 mL    Lactated Ringers: 1500 mL    Lactated Ringers Bolus: 1500 mL    Phenylephrine: 343.3 mL    Propofol: 270.4 mL  Total IN: 3763.7 mL    OUT:    Emesis (mL): 1 mL    Indwelling Catheter - Urethral (mL): 650 mL    Nasogastric/Oral tube (mL): 250 mL  Total OUT: 901 mL    Total NET: 2862.7 mL      LABS:                        11.1   11.11 )-----------( 119      ( 02 Dec 2021 00:17 )             32.7     12-02    138  |  106  |  22  ----------------------------<  170<H>  4.3   |  20<L>  |  1.37<H>    Ca    8.3<L>      02 Dec 2021 00:17  Phos  4.3     12-02  Mg     2.0     12-02    TPro  5.0<L>  /  Alb  3.1<L>  /  TBili  0.9  /  DBili  x   /  AST  13  /  ALT  10  /  AlkPhos  41  12-02    PT/INR - ( 01 Dec 2021 20:26 )   PT: 14.6 sec;   INR: 1.23 ratio         PTT - ( 01 Dec 2021 20:26 )  PTT:26.4 sec

## 2021-12-02 NOTE — PROGRESS NOTE ADULT - SUBJECTIVE AND OBJECTIVE BOX
Patient POD 1 S/P endoleak AAA repair complicated by hypotension and blood loss.  Remains intubated on 0.6 mcg/kg/min phenylephrine and propofol.  Vital signs stable on current regimen.  Hgb 10.8 after 6 units PRBCs intraop.  lactate peaked at 3.0 now 2.2. ICU team attempted weaning from ventilator this am however according to patient's wife he became agitated and was resedated.    Will continue to follow as needed.

## 2021-12-02 NOTE — DIETITIAN INITIAL EVALUATION ADULT. - CHIEF COMPLAINT
Pt is a 77 yo M with PMH: HTN, HLD, AAA, S/P repair with stent x 2, chronic cough, CKD stage 3, s/p cardiac catheterization 2 weeks ago for abnormal stress test. Taken to OR with vascular for abdominal aortic aneurysm endoleak repair c/b Perclose failure at end of case requiring right femoral cutdown and repair of right femoral artery and vein repair. Transferred to SICU for ventilator management and hemodynamic support/monitoring.

## 2021-12-02 NOTE — PROGRESS NOTE ADULT - SUBJECTIVE AND OBJECTIVE BOX
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 Taken to OR with vascular for abdominal aortic aneurysm endoleak repair c/b perclose failure at end of case requiring right femoral cutdown and repair of right femoral artery and vein repair. EBL was 1L, patient resuscitated with 6 u pRBCs, 1u FFP, 1u PLTs, started on phenylephrine gtt intraoperatively and transferred to SICU for postop ventilator management and HD support/monitoring.  24 HOUR EVENTS:    SUBJECTIVE/ROS:  [ ] A ten-point review of systems was otherwise negative except as noted.  [x] Due to altered mental status/intubation, subjective information were not able to be obtained from the patient. History was obtained, to the extent possible, from review of the chart and collateral sources of information.    NEURO  Exam: sedated, arouseable, responding to verbal/painful stimuli  Meds: fentaNYL    Injectable 50 MICROGram(s) IV Push every 3 hours PRN mod/severe pain and agiation  propofol Infusion 50 MICROgram(s)/kG/Min IV Continuous <Continuous>    [x] Adequacy of sedation and pain control has been assessed and adjusted    RESPIRATORY  RR: 20 (12-02-21 @ 01:30) (15 - 24)  SpO2: 98% (12-02-21 @ 01:30) (95% - 100%)  Wt(kg): --  Exam: CTAB, mechanical breath sounds bilaterally  Mechanical Ventilation: Mode: AC/ CMV (Assist Control/ Continuous Mandatory Ventilation), RR (machine): 14, RR (patient): 20, TV (machine): 400, FiO2: 50, PEEP: 5, ITime: 0.9, MAP: 8, PIP: 19  ABG - ( 02 Dec 2021 00:15 )  pH: 7.35  /  pCO2: 38    /  pO2: 118   / HCO3: 21    / Base Excess: -4.2  /  SaO2: 98.8    Lactate: x        [N/A] Extubation Readiness Assessed  Meds:     CARDIOVASCULAR  HR: 81 (12-02-21 @ 01:30) (61 - 82)  BP: 98/62 (12-02-21 @ 01:00) (88/59 - 141/83)  BP(mean): 75 (12-02-21 @ 01:00) (67 - 103)  ABP: 99/62 (12-02-21 @ 01:30) (64/46 - 105/64)  ABP(mean): 74 (12-02-21 @ 01:30) (52 - 78)  Wt(kg): --  CVP(cm H2O): --  Exam: regular rate and rhythm  Cardiac Rhythm: sinus  Perfusion     [x]Adequate   [ ]Inadequate  Mentation   [x]Normal       [ ]Reduced  Extremities  [x]Warm         [ ]Cool  Volume Status [ ]Hypervolemic [x]Euvolemic [ ]Hypovolemic  Meds: phenylephrine    Infusion 0.6 MICROgram(s)/kG/Min IV Continuous <Continuous>    GI/NUTRITION  Exam: soft, nontender, nondistended, incision C/D/I  Diet: NPO  Meds: pantoprazole  Injectable 40 milliGRAM(s) IV Push daily      GENITOURINARY  I&O's Detail    12-01 @ 07:01  -  12-02 @ 02:03  --------------------------------------------------------  IN:    IV PiggyBack: 50 mL    Lactated Ringers: 750 mL    Lactated Ringers Bolus: 500 mL    Phenylephrine: 204.2 mL    Propofol: 152.2 mL  Total IN: 1656.4 mL    OUT:    Indwelling Catheter - Urethral (mL): 400 mL  Total OUT: 400 mL    Total NET: 1256.4 mL        Weight (kg): 93.9 (12-01 @ 12:21)  12-02    138  |  106  |  22  ----------------------------<  170<H>  4.3   |  20<L>  |  1.37<H>    Ca    8.3<L>      02 Dec 2021 00:17  Phos  4.3     12-02  Mg     2.0     12-02    TPro  5.0<L>  /  Alb  3.1<L>  /  TBili  0.9  /  DBili  x   /  AST  13  /  ALT  10  /  AlkPhos  41  12-02    [x] Mojica catheter, indication: N/A  Meds: lactated ringers. 1000 milliLiter(s) IV Continuous <Continuous>  sodium chloride 0.9% lock flush 10 milliLiter(s) IV Push every 1 hour PRN Pre/post blood products, medications, blood draw, and to maintain line patency        HEMATOLOGIC  Meds:   [x] VTE Prophylaxis                        11.1   11.11 )-----------( 119      ( 02 Dec 2021 00:17 )             32.7     PT/INR - ( 01 Dec 2021 20:26 )   PT: 14.6 sec;   INR: 1.23 ratio         PTT - ( 01 Dec 2021 20:26 )  PTT:26.4 sec  Transfusion     [ ] PRBC   [ ] Platelets   [ ] FFP   [ ] Cryoprecipitate      INFECTIOUS DISEASES  WBC Count: 11.11 K/uL (12-02 @ 00:17)  WBC Count: 11.95 K/uL (12-01 @ 20:26)    RECENT CULTURES:  Meds: ceFAZolin   IVPB 2000 milliGRAM(s) IV Intermittent every 8 hours    ENDOCRINE  CAPILLARY BLOOD GLUCOSE    Meds:   ACCESS DEVICES:  [x] Peripheral IV  [x] Central Venous Line	[x] R	[ ] L	[x] IJ	[ ] Fem	[ ] SC	Placed: 12/1/21, per Anesthesia, should be removed within 24 hours.   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 Taken to OR with vascular for abdominal aortic aneurysm endoleak repair c/b perclose failure at end of case requiring right femoral cutdown and repair of right femoral artery and vein repair. EBL was 1L, patient resuscitated with 6 u pRBCs, 1u FFP, 1u PLTs, started on phenylephrine gtt intraoperatively and transferred to SICU for postop ventilator management and HD support/monitoring.    24 HOUR EVENTS:  -admitted to SICU  - weaning edy  - OGT with bilious output    SUBJECTIVE/ROS:  [ ] A ten-point review of systems was otherwise negative except as noted.  [x] Due to altered mental status/intubation, subjective information were not able to be obtained from the patient. History was obtained, to the extent possible, from review of the chart and collateral sources of information.    NEURO  Exam: sedated, arouseable, responding to verbal/painful stimuli  Meds: fentaNYL    Injectable 50 MICROGram(s) IV Push every 3 hours PRN mod/severe pain and agiation  propofol Infusion 50 MICROgram(s)/kG/Min IV Continuous <Continuous>    [x] Adequacy of sedation and pain control has been assessed and adjusted    RESPIRATORY  RR: 20 (12-02-21 @ 01:30) (15 - 24)  SpO2: 98% (12-02-21 @ 01:30) (95% - 100%)  Wt(kg): --  Exam: CTAB, mechanical breath sounds bilaterally  Mechanical Ventilation: Mode: AC/ CMV (Assist Control/ Continuous Mandatory Ventilation), RR (machine): 14, RR (patient): 20, TV (machine): 400, FiO2: 50, PEEP: 5, ITime: 0.9, MAP: 8, PIP: 19  ABG - ( 02 Dec 2021 00:15 )  pH: 7.35  /  pCO2: 38    /  pO2: 118   / HCO3: 21    / Base Excess: -4.2  /  SaO2: 98.8    Lactate: x        [N/A] Extubation Readiness Assessed  Meds:     CARDIOVASCULAR  HR: 81 (12-02-21 @ 01:30) (61 - 82)  BP: 98/62 (12-02-21 @ 01:00) (88/59 - 141/83)  BP(mean): 75 (12-02-21 @ 01:00) (67 - 103)  ABP: 99/62 (12-02-21 @ 01:30) (64/46 - 105/64)  ABP(mean): 74 (12-02-21 @ 01:30) (52 - 78)  Wt(kg): --  CVP(cm H2O): --  Exam: regular rate and rhythm  Cardiac Rhythm: sinus  Perfusion     [x]Adequate   [ ]Inadequate  Mentation   [x]Normal       [ ]Reduced  Extremities  [x]Warm         [ ]Cool  Volume Status [ ]Hypervolemic [x]Euvolemic [ ]Hypovolemic  Meds: phenylephrine    Infusion 0.6 MICROgram(s)/kG/Min IV Continuous <Continuous>    GI/NUTRITION  Exam: soft, nontender, nondistended, incision C/D/I  Diet: NPO  Meds: pantoprazole  Injectable 40 milliGRAM(s) IV Push daily      GENITOURINARY  I&O's Detail    12-01 @ 07:01  -  12-02 @ 02:03  --------------------------------------------------------  IN:    IV PiggyBack: 50 mL    Lactated Ringers: 750 mL    Lactated Ringers Bolus: 500 mL    Phenylephrine: 204.2 mL    Propofol: 152.2 mL  Total IN: 1656.4 mL    OUT:    Indwelling Catheter - Urethral (mL): 400 mL  Total OUT: 400 mL    Total NET: 1256.4 mL        Weight (kg): 93.9 (12-01 @ 12:21)  12-02    138  |  106  |  22  ----------------------------<  170<H>  4.3   |  20<L>  |  1.37<H>    Ca    8.3<L>      02 Dec 2021 00:17  Phos  4.3     12-02  Mg     2.0     12-02    TPro  5.0<L>  /  Alb  3.1<L>  /  TBili  0.9  /  DBili  x   /  AST  13  /  ALT  10  /  AlkPhos  41  12-02    [x] Mojica catheter, indication: N/A  Meds: lactated ringers. 1000 milliLiter(s) IV Continuous <Continuous>  sodium chloride 0.9% lock flush 10 milliLiter(s) IV Push every 1 hour PRN Pre/post blood products, medications, blood draw, and to maintain line patency        HEMATOLOGIC  Meds:   [x] VTE Prophylaxis                        11.1   11.11 )-----------( 119      ( 02 Dec 2021 00:17 )             32.7     PT/INR - ( 01 Dec 2021 20:26 )   PT: 14.6 sec;   INR: 1.23 ratio         PTT - ( 01 Dec 2021 20:26 )  PTT:26.4 sec  Transfusion     [ ] PRBC   [ ] Platelets   [ ] FFP   [ ] Cryoprecipitate      INFECTIOUS DISEASES  WBC Count: 11.11 K/uL (12-02 @ 00:17)  WBC Count: 11.95 K/uL (12-01 @ 20:26)    RECENT CULTURES:  Meds: ceFAZolin   IVPB 2000 milliGRAM(s) IV Intermittent every 8 hours    ENDOCRINE  CAPILLARY BLOOD GLUCOSE    Meds:   ACCESS DEVICES:  [x] Peripheral IV  [x] Central Venous Line	[x] R	[ ] L	[x] IJ	[ ] Fem	[ ] SC	Placed: 12/1/21, per Anesthesia, should be removed within 24 hours.   [ ] Arterial Line		[ ] R	[ ] L	[ ] Fem	[ ] Rad	[ ] Ax	Placed:   [ ] PICC:					[ ] Mediport  [ ] Urinary Catheter, Date Placed:   [x] Necessity of urinary, arterial, and venous catheters discussed    OTHER MEDICATIONS:  chlorhexidine 0.12% Liquid 15 milliLiter(s) Oral Mucosa every 12 hours  chlorhexidine 4% Liquid 1 Application(s) Topical <User Schedule>      CODE STATUS:  Full Code

## 2021-12-02 NOTE — DIETITIAN INITIAL EVALUATION ADULT. - OTHER INFO
Dosing wt Dosing wt: (12/2): 207 lbs  Wt trend (per [facility]): (11/23): 208 lbs; (11/5): 208 lbs; (10/27): 204 lbs; (7/2/20): 203 lbs; (1/6/20): 201 lbs. Wt generally stable between 200-208 lbs. Will continue to monitor.     Pt NPO since admission 2/2 complex clinical course. Remains intubated. See below for EN feeds recommendations if warranted. Pt previously received propofol, however now discontinued. Remains on Precedex for sedation.    Skin: RLQ transverse 12/1  Edema: 1+ generalized; right arm; left arm; left leg; right leg  GI: no documented BM's recorded thus far. NGT in place for suctioning. Noted with output of 250ml thus far today.

## 2021-12-02 NOTE — DIETITIAN INITIAL EVALUATION ADULT. - REASON
pt unable to provide consent for Nutrition Focused Physical Assessment; no visual signs of depletion based on observation

## 2021-12-02 NOTE — PROGRESS NOTE ADULT - ASSESSMENT
ASSESSMENT   Patient is a 76y y/o Male POD1 s/p abdominal aortic aneurysm endoleak repair c/b perclose failure at end of case requiring right femoral cutdown and repair of right femoral artery and vein repair. Patient intubated, sedated in SICU.    PLAN    - Pain control  - Wean vent settings and SBT as tolerated  - Wean phenylephrine as tolerated  - Monitor Right DP/PT signals  - F/U AM labs  - OGT  - Monitor vital signs  - Appreciate outstanding SICU care  - F/U AM CXR      Vascular Surgery  [id number]

## 2021-12-03 ENCOUNTER — TRANSCRIPTION ENCOUNTER (OUTPATIENT)
Age: 76
End: 2021-12-03

## 2021-12-03 LAB
A1C WITH ESTIMATED AVERAGE GLUCOSE RESULT: 5.6 % — SIGNIFICANT CHANGE UP (ref 4–5.6)
ANION GAP SERPL CALC-SCNC: 12 MMOL/L — SIGNIFICANT CHANGE UP (ref 5–17)
BUN SERPL-MCNC: 33 MG/DL — HIGH (ref 7–23)
CALCIUM SERPL-MCNC: 7.9 MG/DL — LOW (ref 8.4–10.5)
CHLORIDE SERPL-SCNC: 105 MMOL/L — SIGNIFICANT CHANGE UP (ref 96–108)
CO2 SERPL-SCNC: 22 MMOL/L — SIGNIFICANT CHANGE UP (ref 22–31)
CREAT SERPL-MCNC: 1.63 MG/DL — HIGH (ref 0.5–1.3)
ESTIMATED AVERAGE GLUCOSE: 114 MG/DL — SIGNIFICANT CHANGE UP (ref 68–114)
GLUCOSE BLDC GLUCOMTR-MCNC: 101 MG/DL — HIGH (ref 70–99)
GLUCOSE BLDC GLUCOMTR-MCNC: 116 MG/DL — HIGH (ref 70–99)
GLUCOSE BLDC GLUCOMTR-MCNC: 121 MG/DL — HIGH (ref 70–99)
GLUCOSE BLDC GLUCOMTR-MCNC: 97 MG/DL — SIGNIFICANT CHANGE UP (ref 70–99)
GLUCOSE SERPL-MCNC: 121 MG/DL — HIGH (ref 70–99)
HCT VFR BLD CALC: 27.1 % — LOW (ref 39–50)
HCT VFR BLD CALC: 28.1 % — LOW (ref 39–50)
HEPARIN-PF4 AB RESULT: <0.6 U/ML — SIGNIFICANT CHANGE UP (ref 0–0.9)
HGB BLD-MCNC: 9.1 G/DL — LOW (ref 13–17)
HGB BLD-MCNC: 9.4 G/DL — LOW (ref 13–17)
MAGNESIUM SERPL-MCNC: 2.1 MG/DL — SIGNIFICANT CHANGE UP (ref 1.6–2.6)
MCHC RBC-ENTMCNC: 30.3 PG — SIGNIFICANT CHANGE UP (ref 27–34)
MCHC RBC-ENTMCNC: 31.1 PG — SIGNIFICANT CHANGE UP (ref 27–34)
MCHC RBC-ENTMCNC: 33.5 GM/DL — SIGNIFICANT CHANGE UP (ref 32–36)
MCHC RBC-ENTMCNC: 33.6 GM/DL — SIGNIFICANT CHANGE UP (ref 32–36)
MCV RBC AUTO: 90.6 FL — SIGNIFICANT CHANGE UP (ref 80–100)
MCV RBC AUTO: 92.5 FL — SIGNIFICANT CHANGE UP (ref 80–100)
NRBC # BLD: 0 /100 WBCS — SIGNIFICANT CHANGE UP (ref 0–0)
NRBC # BLD: 0 /100 WBCS — SIGNIFICANT CHANGE UP (ref 0–0)
PF4 HEPARIN CMPLX AB SER-ACNC: NEGATIVE — SIGNIFICANT CHANGE UP
PHOSPHATE SERPL-MCNC: 3.5 MG/DL — SIGNIFICANT CHANGE UP (ref 2.5–4.5)
PLATELET # BLD AUTO: 77 K/UL — LOW (ref 150–400)
PLATELET # BLD AUTO: 88 K/UL — LOW (ref 150–400)
POTASSIUM SERPL-MCNC: 4.2 MMOL/L — SIGNIFICANT CHANGE UP (ref 3.5–5.3)
POTASSIUM SERPL-SCNC: 4.2 MMOL/L — SIGNIFICANT CHANGE UP (ref 3.5–5.3)
RBC # BLD: 2.93 M/UL — LOW (ref 4.2–5.8)
RBC # BLD: 3.1 M/UL — LOW (ref 4.2–5.8)
RBC # FLD: 14.5 % — SIGNIFICANT CHANGE UP (ref 10.3–14.5)
RBC # FLD: 14.6 % — HIGH (ref 10.3–14.5)
SODIUM SERPL-SCNC: 139 MMOL/L — SIGNIFICANT CHANGE UP (ref 135–145)
WBC # BLD: 10.14 K/UL — SIGNIFICANT CHANGE UP (ref 3.8–10.5)
WBC # BLD: 9.52 K/UL — SIGNIFICANT CHANGE UP (ref 3.8–10.5)
WBC # FLD AUTO: 10.14 K/UL — SIGNIFICANT CHANGE UP (ref 3.8–10.5)
WBC # FLD AUTO: 9.52 K/UL — SIGNIFICANT CHANGE UP (ref 3.8–10.5)

## 2021-12-03 PROCEDURE — 74018 RADEX ABDOMEN 1 VIEW: CPT | Mod: 26

## 2021-12-03 PROCEDURE — 71045 X-RAY EXAM CHEST 1 VIEW: CPT | Mod: 26

## 2021-12-03 PROCEDURE — 99233 SBSQ HOSP IP/OBS HIGH 50: CPT

## 2021-12-03 RX ORDER — FUROSEMIDE 40 MG
20 TABLET ORAL ONCE
Refills: 0 | Status: COMPLETED | OUTPATIENT
Start: 2021-12-03 | End: 2021-12-03

## 2021-12-03 RX ORDER — METOPROLOL TARTRATE 50 MG
5 TABLET ORAL EVERY 6 HOURS
Refills: 0 | Status: DISCONTINUED | OUTPATIENT
Start: 2021-12-03 | End: 2021-12-04

## 2021-12-03 RX ORDER — ACETAMINOPHEN 500 MG
1000 TABLET ORAL EVERY 6 HOURS
Refills: 0 | Status: COMPLETED | OUTPATIENT
Start: 2021-12-03 | End: 2021-12-04

## 2021-12-03 RX ORDER — ASPIRIN/CALCIUM CARB/MAGNESIUM 324 MG
300 TABLET ORAL DAILY
Refills: 0 | Status: DISCONTINUED | OUTPATIENT
Start: 2021-12-03 | End: 2021-12-04

## 2021-12-03 RX ORDER — ENOXAPARIN SODIUM 100 MG/ML
40 INJECTION SUBCUTANEOUS DAILY
Refills: 0 | Status: DISCONTINUED | OUTPATIENT
Start: 2021-12-03 | End: 2021-12-07

## 2021-12-03 RX ORDER — SODIUM CHLORIDE 9 MG/ML
1000 INJECTION, SOLUTION INTRAVENOUS
Refills: 0 | Status: DISCONTINUED | OUTPATIENT
Start: 2021-12-03 | End: 2021-12-04

## 2021-12-03 RX ORDER — ONDANSETRON 8 MG/1
4 TABLET, FILM COATED ORAL EVERY 6 HOURS
Refills: 0 | Status: DISCONTINUED | OUTPATIENT
Start: 2021-12-03 | End: 2021-12-08

## 2021-12-03 RX ADMIN — Medication 1000 MILLIGRAM(S): at 17:18

## 2021-12-03 RX ADMIN — Medication 5 MILLIGRAM(S): at 23:03

## 2021-12-03 RX ADMIN — Medication 1000 MILLIGRAM(S): at 12:29

## 2021-12-03 RX ADMIN — Medication 300 MILLIGRAM(S): at 17:03

## 2021-12-03 RX ADMIN — Medication 3 MILLILITER(S): at 06:49

## 2021-12-03 RX ADMIN — ATORVASTATIN CALCIUM 40 MILLIGRAM(S): 80 TABLET, FILM COATED ORAL at 22:59

## 2021-12-03 RX ADMIN — Medication 1000 MILLIGRAM(S): at 23:19

## 2021-12-03 RX ADMIN — ENOXAPARIN SODIUM 40 MILLIGRAM(S): 100 INJECTION SUBCUTANEOUS at 22:59

## 2021-12-03 RX ADMIN — Medication 400 MILLIGRAM(S): at 12:14

## 2021-12-03 RX ADMIN — Medication 5 MILLIGRAM(S): at 13:15

## 2021-12-03 RX ADMIN — TRAMADOL HYDROCHLORIDE 25 MILLIGRAM(S): 50 TABLET ORAL at 23:34

## 2021-12-03 RX ADMIN — HYDROMORPHONE HYDROCHLORIDE 0.5 MILLIGRAM(S): 2 INJECTION INTRAMUSCULAR; INTRAVENOUS; SUBCUTANEOUS at 00:25

## 2021-12-03 RX ADMIN — CHLORHEXIDINE GLUCONATE 1 APPLICATION(S): 213 SOLUTION TOPICAL at 06:52

## 2021-12-03 RX ADMIN — Medication 400 MILLIGRAM(S): at 23:04

## 2021-12-03 RX ADMIN — Medication 400 MILLIGRAM(S): at 17:03

## 2021-12-03 RX ADMIN — Medication 20 MILLIGRAM(S): at 10:40

## 2021-12-03 RX ADMIN — SODIUM CHLORIDE 30 MILLILITER(S): 9 INJECTION, SOLUTION INTRAVENOUS at 10:41

## 2021-12-03 RX ADMIN — Medication 20 MILLIGRAM(S): at 17:02

## 2021-12-03 RX ADMIN — TRAMADOL HYDROCHLORIDE 25 MILLIGRAM(S): 50 TABLET ORAL at 23:03

## 2021-12-03 RX ADMIN — SODIUM CHLORIDE 30 MILLILITER(S): 9 INJECTION, SOLUTION INTRAVENOUS at 13:15

## 2021-12-03 RX ADMIN — Medication 5 MILLIGRAM(S): at 17:02

## 2021-12-03 RX ADMIN — Medication 3 MILLILITER(S): at 17:30

## 2021-12-03 RX ADMIN — Medication 3 MILLILITER(S): at 11:45

## 2021-12-03 NOTE — PROGRESS NOTE ADULT - ASSESSMENT
77 y/o male w/ a PMHx of HTN, HLD, BPH, nephrolithiasis, varicose veins, s/p bilateral ANGELICA, left iliac artery aneurysm, AAA s/p EVAR presenting s/p repair of type 2B endoleak of the right limb of EVAR stent c/b Perclosure failure requiring a right femoral cutdown w/ primary repair of the right femoral artery & vein, hemorrhagic shock requiring vasopressor support, & acute post-op respiratory insufficiency.     - Restart ASA today  - Follow resp status: Diuresis today  - F/U AM labs- Thrombocytopenia workup (plt 77)  - Pain control  - Monitor Right DP/PT signals  - Monitor vital signs  - Appreciate outstanding SICU care    Vascular Surgery  [id number] 77 y/o male w/ a PMHx of HTN, HLD, BPH, nephrolithiasis, varicose veins, s/p bilateral ANGELICA, left iliac artery aneurysm, AAA s/p EVAR presenting s/p repair of type 2B endoleak of the right limb of EVAR stent c/b Perclosure failure requiring a right femoral cutdown w/ primary repair of the right femoral artery & vein, hemorrhagic shock requiring vasopressor support, & acute post-op respiratory insufficiency.     - Restart ASA today  - Follow resp status: Diuresis today  - F/U AM labs- Thrombocytopenia workup (plt 77)  - Pain control  - Monitor Right DP/PT signals  - Monitor vital signs  - Appreciate outstanding SICU care  Vascular Surgery  [id number] 75 y/o male w/ a PMHx of HTN, HLD, BPH, nephrolithiasis, varicose veins, s/p bilateral ANGELICA, left iliac artery aneurysm, AAA s/p EVAR presenting s/p repair of type 2B endoleak of the right limb of EVAR stent c/b Perclosure failure requiring a right femoral cutdown w/ primary repair of the right femoral artery & vein, hemorrhagic shock requiring vasopressor support, & acute post-op respiratory insufficiency.     - Restart ASA today  - Follow resp status: Diuresis today  - F/U AM labs- Thrombocytopenia workup (plt 77)  - Pain control  - Monitor Right DP/PT signals  - Monitor vital signs  - PT/OT eval today  - Appreciate outstanding SICU care    Vascular Surgery  [id number]

## 2021-12-03 NOTE — OCCUPATIONAL THERAPY INITIAL EVALUATION ADULT - ADL RETRAINING, OT EVAL
Patient will dress lower body (I), AE as needed in 4 weeks. Patient will dress upper body (I) in 4 weeks. Pt will self feed (I), including set-up, AE as needed, within 4 weeks.

## 2021-12-03 NOTE — OCCUPATIONAL THERAPY INITIAL EVALUATION ADULT - PERTINENT HX OF CURRENT PROBLEM, REHAB EVAL
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 CKD stage 3, BPH, S/P cardiac catheterization 2 weeks ago for abnormal stress test, no PCI, only medical management.  Presents today for abdominal aortic aneurysm endoleak repair with right iliac limb percutaneous femoral exposure.

## 2021-12-03 NOTE — DISCHARGE NOTE PROVIDER - NSDCACTIVITY_GEN_ALL_CORE
No heavy lifting/straining/Follow Instructions Provided by your Surgical Team Do not drive or operate machinery/Showering allowed/Do not make important decisions/Stairs allowed/Walking - Indoors allowed/No heavy lifting/straining/Walking - Outdoors allowed/Follow Instructions Provided by your Surgical Team

## 2021-12-03 NOTE — PROGRESS NOTE ADULT - SUBJECTIVE AND OBJECTIVE BOX
VASCULAR SURGERY DAILY PROGRESS NOTE:     24 HOUR EVENTS:  - Successfully extubated but became hypoxemic afterwards requiring HFNC 50 LPM/50% FiO2  - Episode of vomiting overnight so kept NPO w/ sips & ice chips  - Started on standing Duoneb q6hrs  - Lactate elevated to 3 so given 1 L of crystalloid and lactate cleared to 1.3  - HCT slowly downtrended from 32.7 -> 31.3 -> 29.3 -> 28.5 -> 27.1  - Added ISS q6hrs  - Right IJ introducer d/nelly    SUBJECTIVE/ROS: Patient feels well. He reports appropriate incisional pain.   Denies nausea, vomiting, chest pain. He specifically does not endorse sob.      MEDICATIONS  (STANDING):  albuterol/ipratropium for Nebulization 3 milliLiter(s) Nebulizer every 6 hours  atorvastatin 40 milliGRAM(s) Oral at bedtime  chlorhexidine 2% Cloths 1 Application(s) Topical <User Schedule>  insulin lispro (ADMELOG) corrective regimen sliding scale   SubCutaneous every 6 hours  lactated ringers. 1000 milliLiter(s) (125 mL/Hr) IV Continuous <Continuous>    MEDICATIONS  (PRN):  acetaminophen     Tablet .. 500 milliGRAM(s) Oral every 6 hours PRN Mild Pain (1 - 3)  traMADol 25 milliGRAM(s) Oral every 6 hours PRN Moderate Pain (4 - 6)    OBJECTIVE:  Vital Signs Last 24 Hrs  T(C): 37.2 (03 Dec 2021 03:00), Max: 37.7 (02 Dec 2021 15:00)  T(F): 99 (03 Dec 2021 03:00), Max: 99.9 (02 Dec 2021 15:00)  HR: 85 (03 Dec 2021 07:00) (70 - 94)  BP: --  BP(mean): --  RR: 16 (03 Dec 2021 07:00) (12 - 37)  SpO2: 92% (03 Dec 2021 07:00) (89% - 100%)    I&O's Detail  02 Dec 2021 07:01  -  03 Dec 2021 07:00  --------------------------------------------------------  IN:    Dexmedetomidine: 79.6 mL    Lactated Ringers: 3000 mL    Phenylephrine: 155.1 mL    Propofol: 39.4 mL  Total IN: 3274.1 mL    OUT:    Indwelling Catheter - Urethral (mL): 1110 mL    Nasogastric/Oral tube (mL): 200 mL  Total OUT: 1310 mL  Total NET: 1964.1 mL    LABS:                        9.1    9.52  )-----------( 77       ( 03 Dec 2021 06:58 )             27.1     12-03    139  |  105  |  33<H>  ----------------------------<  121<H>  4.2   |  22  |  1.63<H>    Ca    7.9<L>      03 Dec 2021 06:58  Phos  3.5     12-03  Mg     2.1     12-03    TPro  4.9<L>  /  Alb  2.9<L>  /  TBili  0.5  /  DBili  x   /  AST  10  /  ALT  7<L>  /  AlkPhos  38<L>  12-02  PT/INR - ( 01 Dec 2021 20:26 )   PT: 14.6 sec;   INR: 1.23 ratio    PTT - ( 01 Dec 2021 20:26 )  PTT:26.4 sec      PHYSICAL EXAM:  Constitutional: awake, alert, oriented x4, no acute distress, responding appropriately  Respiratory: on high flow NC  Abd: soft, nontender, nondistended, right groin dressing w/ minimal strikethrough  Extremities:  Right/Left + DP/PT signals

## 2021-12-03 NOTE — OCCUPATIONAL THERAPY INITIAL EVALUATION ADULT - TRANSFER TRAINING, PT EVAL
Pt will perform sit <-> stand transfers (I) within 4 weeks. Patient will transfer to toilet with DME as needed (I) in 4 weeks

## 2021-12-03 NOTE — PHYSICAL THERAPY INITIAL EVALUATION ADULT - PLANNED THERAPY INTERVENTIONS, PT EVAL
GOALS: Pt will negotiate 12 steps with handrail  & step to pattern with independence in 4wks/bed mobility training/gait training/transfer training

## 2021-12-03 NOTE — OCCUPATIONAL THERAPY INITIAL EVALUATION ADULT - ASR WT BEARING STATUS EVAL
extubated but became hypoxemic afterwards requiring HFNC 50 LPM/50% FiO2/no weight-bearing restrictions

## 2021-12-03 NOTE — PHYSICAL THERAPY INITIAL EVALUATION ADULT - ACTIVE RANGE OF MOTION EXAMINATION, REHAB EVAL
BLE hands swollen/bilateral upper extremity Active ROM was WFL (within functional limits)/bilateral  lower extremity Active ROM was WFL (within functional limits)

## 2021-12-03 NOTE — DISCHARGE NOTE PROVIDER - NSDCMRMEDTOKEN_GEN_ALL_CORE_FT
aspirin 81 mg oral tablet: 1 tab(s) orally once a day  atorvastatin 40 mg oral tablet: 1 tab(s) orally once a day  metoprolol succinate 50 mg oral tablet, extended release: 1 tab(s) orally once a day  valsartan-hydrochlorothiazide 160 mg-12.5 mg oral tablet: 1 tab(s) orally once a day   aspirin 81 mg oral tablet: 1 tab(s) orally once a day  atorvastatin 40 mg oral tablet: 1 tab(s) orally once a day  metoprolol succinate 50 mg oral tablet, extended release: 1 tab(s) orally once a day  rolling walker: dx: I74.1  valsartan-hydrochlorothiazide 160 mg-12.5 mg oral tablet: 1 tab(s) orally once a day

## 2021-12-03 NOTE — PROGRESS NOTE ADULT - ASSESSMENT
75 y/o male w/ a PMHx of HTN, HLD, BPH, nephrolithiasis, varicose veins, s/p bilateral ANGELICA, left iliac artery aneurysm, AAA s/p EVAR presenting s/p repair of type 2B endoleak of the right limb of EVAR stent c/b Perclosure failure requiring a right femoral cutdown w/ primary repair of the right femoral artery & vein, hemorrhagic shock requiring vasopressor support, & acute post-op respiratory insufficiency    PLAN:    Neuro: acute post-op pain  - Multimodal pain control w/ acetaminophen and tramadol as needed    Resp: acute post-op respiratory insufficiency (resolved)  - Out of bed to chair and incentive spirometry to prevent atelectasis    CV: hemorrhagic shock (resolved), history of HTN  - Holding home metoprolol, valsatran, and HCTZ given recent hemorrhagic shock    GI: no acute issues  - NPO w/ sips but will advance diet as tolerated  - Will start bowel regimen with senna & Miralax    Renal: no acute issues  - LR at 125 mL/hr while NPO    Heme: acute blood loss anemia  - Monitor CBC and coags  - Venodynes for mechanical VTE prophylaxis; holding chemical VTE prophylaxis given recent bleed    ID: no acute issues  - Monitor for clinical evidence of active infection    Endo: HLD, hyperglycemia  - Monitor glucose w/ ISS q6hrs for glycemic control; will f/u HgbA1C  - Home atorvastatin for HLD    Vasc: type 2B endoleak of the right limb of EVAR stent s/p repair c/b Perclosure failure s/p right femoral cutdown w/ primary repair of the right femoral artery & vein  - Neurovascular checks q1hrs of RLE  - Holding home ASA given recent bleed    Code Status: Full code    Disposition: Will remain in [facility]    Maria Luz Rivera PA-C     [id number]

## 2021-12-03 NOTE — OCCUPATIONAL THERAPY INITIAL EVALUATION ADULT - PRECAUTIONS/LIMITATIONS, REHAB EVAL
Case was c/b Perclosure failure at the end of the case requiring a right femoral cutdown w/ primary repair of the right femoral artery & vein. He was in hemorrhagic shock requiring vasopressor support so he was left intubated at the end of the case. Patient was successfully extubated and weaned off vasopressor support since 12/2./fall precautions

## 2021-12-03 NOTE — PHYSICAL THERAPY INITIAL EVALUATION ADULT - PERTINENT HX OF CURRENT PROBLEM, REHAB EVAL
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  Presents today for abdominal aortic aneurysm endoleak repair with right iliac limb percutaneous femoral exposure.

## 2021-12-03 NOTE — DISCHARGE NOTE PROVIDER - HOSPITAL COURSE
77 y/o male w/ a PMHx of HTN, HLD, BPH, nephrolithiasis, varicose veins, s/p bilateral ANGELICA, left iliac artery aneurysm, AAA s/p EVAR who presented on 12/1 for a scheduled repair of a type 2B endoleak of the right limb of his EVAR. Case was c/b Perclosure failure at the end of the case requiring a right femoral cutdown w/ primary repair of the right femoral artery & vein. He was in hemorrhagic shock requiring vasopressor support so he was left intubated at the end of the case and transferred to SICU for postop vent management.    Patient was successfully extubated and weaned off vasopressor support  on 12/2, he remained on high flow nasal cannula on 12/3. Patient was closely monitored in the SICU. A HIIT panel was sent for thrombocytopenia.   Physical therapy evaluated the patient and determined he may go home with home PT and assistance.   On day of discharge, the patient was tolerating diet, ambulating well and pain controlled. All questions were answered and patient safely discharged home. 77 y/o male w/ a PMHx of HTN, HLD, BPH, nephrolithiasis, varicose veins, s/p bilateral ANGELICA, left iliac artery aneurysm, AAA s/p EVAR who presented on 12/1 for a scheduled repair of a type 2B endoleak of the right limb of his EVAR. Case was c/b Perclosure failure at the end of the case requiring a right femoral cutdown w/ primary repair of the right femoral artery & vein. He was in hemorrhagic shock requiring vasopressor support so he was left intubated at the end of the case and transferred to SICU for postop vent management.    Patient was successfully extubated and weaned off vasopressor support  on 12/2, he remained on high flow nasal cannula on 12/3. Patient was closely monitored in the SICU. A HIIT panel was sent for thrombocytopenia.  Diuresis and Oxygen via NC was weaned off.   Physical therapy evaluated the patient and determined he may go home with home PT and assistance.   On day of discharge, the patient was tolerating diet, ambulating well and pain controlled. All questions were answered and patient safely discharged home. 77 y/o male w/ a PMHx of HTN, HLD, BPH, nephrolithiasis, varicose veins, s/p bilateral ANGELICA, left iliac artery aneurysm, AAA s/p EVAR who presented on 12/1 for a scheduled repair of a type 2B endoleak of the right limb of his EVAR. Case was c/b Perclosure failure at the end of the case requiring a right femoral cutdown w/ primary repair of the right femoral artery & vein. He was in hemorrhagic shock requiring vasopressor support so he was left intubated at the end of the case and transferred to SICU for postop vent management.    Patient was successfully extubated and weaned off vasopressor support  on 12/2, he remained on high flow nasal cannula on 12/3. Patient was closely monitored in the SICU. A HIIT panel was sent for thrombocytopenia which was negative.  Diuresis and Oxygen via NC was weaned off.   CXR on 12/04 revealed small bilateral pleural effusions, left greater than right and Lasix 20x2 was given. Patient transferred to floor on 12/5 and remained stable on floor. On 12/7 CTA performed and decision made to RTOR on 12/8.   Physical therapy evaluated the patient and determined he may go home with home PT and assistance.  On day of discharge, the patient was tolerating diet, ambulating well and pain controlled. All questions were answered and patient safely discharged home. 75 y/o male w/ a PMHx of HTN, HLD, BPH, nephrolithiasis, varicose veins, s/p bilateral ANGELICA, left iliac artery aneurysm, AAA s/p EVAR who presented on 12/1 for a scheduled repair of a type 2B endoleak of the right limb of his EVAR. Case was c/b Perclosure failure at the end of the case requiring a right femoral cutdown w/ primary repair of the right femoral artery & vein. He was in hemorrhagic shock requiring vasopressor support so he was left intubated at the end of the case and transferred to SICU for postop vent management.    Patient was successfully extubated and weaned off vasopressor support  on 12/2, he remained on high flow nasal cannula on 12/3. Patient was closely monitored in the SICU. A HIIT panel was sent for thrombocytopenia which was negative.  Diuresis and Oxygen via NC was weaned off.   CXR on 12/04 revealed small bilateral pleural effusions, left greater than right and Lasix 20x2 was given. Patient transferred to floor on 12/5 and remained stable on floor. On 12/7 CTA performed and decision made to RTOR on 12/8.   On 12/8 patient underwent R iliac thromboembolectomy with Alexander catheters as well as placement of 2 Viabahn iliac stents. The patient tolerated the procedure well and did not have issues with extubation.   Physical therapy evaluated the patient and determined he may go home with rolling walker, as well as home PT and assistance.  On day of discharge, the patient was tolerating diet, ambulating well and pain controlled. All questions were answered and patient safely discharged home.

## 2021-12-03 NOTE — OCCUPATIONAL THERAPY INITIAL EVALUATION ADULT - LIVES WITH, PROFILE
Pvt home, 6 steps to enter, 1 flight to bed and bath. +Tub 2nd floor, stall 1st floor. (I) ADLs and functional transfers. Owns straight cane/spouse

## 2021-12-03 NOTE — DISCHARGE NOTE PROVIDER - CARE PROVIDER_API CALL
Chadwick Marquez)  Surgery; Vascular Surgery  [address], [address]  [place] [address]  Phone: [phone]  Fax: [phone]  Follow Up Time: 1 week

## 2021-12-03 NOTE — PROGRESS NOTE ADULT - SUBJECTIVE AND OBJECTIVE BOX
HISTORY:  77 y/o male w/ a PMHx of HTN HISTORY:  77 y/o male w/ a PMHx of HTN, HLD, BPH, nephrolithiasis, varicose veins, s/p bilateral ANGELICA, left iliac artery aneurysm, AAA s/p EVAR who presented on 12/1 for a scheduled repair of a type 2B endoleak of the right limb of his EVAR. Case was c/b Perclosure failure at the end of the case requiring a right femoral cutdown w/ primary repair of the right femoral artery & vein. He was in hemorrhagic shock requiring vasopressor support so he was left intubated at the end of the case. Patient was successfully extubated and weaned off vasopressor support since 12/2. Patient reports right groin incisional pain but otherwise denies headache, weakness, dizziness, fevers, chills, shortness of breath, chest pain, abdominal pain, or nausea/vomiting.    24 HOUR EVENTS:  - Successfully extubated but became hypoxemic afterwards requiring HFNC 50 LPM/50% FiO2  - Episode of vomiting overnight so kept NPO w/ sips & ice chips  - Started on standing Duoneb q6hrs  - Lactate elevated to 3 so given 1 L of crystalloid and lactate cleared to 1.3  - HCT slowly downtrended from 32.7 -> 31.3 -> 29.3 -> 28.5 ->   - Added ISS q6hrs    NEURO  Exam: awake, alert, oriented x4, no acute distress, no acute focal deficits  Meds:  - acetaminophen     Tablet .. 500 milliGRAM(s) Oral every 6 hours PRN Mild Pain (1 - 3)  - traMADol 25 milliGRAM(s) Oral every 6 hours PRN Moderate Pain (4 - 6)    RESPIRATORY  RR: 12 (12-03-21 @ 03:07) (12 - 37)  SpO2: 95% (12-03-21 @ 03:07) (89% - 100%)  Exam: clear to auscultation bilaterally  Meds: albuterol/ipratropium for Nebulization 3 milliLiter(s) Nebulizer every 6 hours    CARDIOVASCULAR  HR: 72 (12-03-21 @ 03:07) (70 - 94)  ABP: 131/66 (12-03-21 @ 03:00) (87/51 - 148/77)  ABP(mean): 87 (12-03-21 @ 03:00) (62 - 100)  Exam: regular rate and rhythm, S1S2  Cardiac Rhythm: sinus  Perfusion    [x]Adequate    [ ]Inadequate  Mentation   [x]Normal       [ ]Reduced  Extremities  [x]Warm         [ ]Cool  Volume Status [ ]Hypervolemic [x]Euvolemic [ ]Hypovolemic    GI/NUTRITION  Exam: soft, nontender, nondistended, right groin dressing w/ minimal strikethrough  Diet: NPO except medications, sips, and ice chips    GENITOURINARY  I&O's Detail  12-02 @ 07:01  -  12-03 @ 06:52  --------------------------------------------------------  IN:    Dexmedetomidine: 79.6 mL    Lactated Ringers: 2500 mL    Phenylephrine: 155.1 mL    Propofol: 39.4 mL  Total IN: 2774.1 mL    OUT:    Indwelling Catheter - Urethral (mL): 900 mL    Nasogastric/Oral tube (mL): 200 mL  Total OUT: 1100 mL    Total NET: 1674.1 mL    139  |  106  |  27<H>  ----------------------------<  129<H>  4.4   |  20<L>  |  1.63<H>    Ca    7.7<L>      02 Dec 2021 12:55  Phos  4.4  Mg     2.0  TPro  4.9<L>  /  Alb  2.9<L>  /  TBili  0.5  /  DBili  x   /  AST  10  /  ALT  7<L>  /  AlkPhos  38<L>    Meds: lactated ringers infuse at 125 mL/hr    HEMATOLOGIC                       9.5    9.96  )-----------( 90       ( 02 Dec 2021 21:05 )             28.5     INFECTIOUS DISEASES  T(C): 37.2 (12-03-21 @ 03:00), Max: 37.7 (12-02-21 @ 15:00)  WBC Count:  - 9.96 K/uL (12-02 @ 21:05)  - 10.52 K/uL (12-02 @ 12:55)    ENDOCRINE  Capillary Blood Glucose:  - 138 mg/dL (02 Dec 2021 17:39)  - 124 mg/dL (02 Dec 2021 11:24)  Meds:  - atorvastatin 40 milliGRAM(s) Oral at bedtime  - insulin lispro (ADMELOG) corrective regimen sliding scale   SubCutaneous every 6 hours    ACCESS DEVICES:  [x] Peripheral IV  [ ] Central Venous Line		[ ] R	[ ] L	[ ] IJ	[ ] Fem	[ ] SC	Placed:   [x] Arterial Line			[x] R	[ ] L	[ ] Fem	[x] Rad	[ ] Ax	Placed: 12/1  [ ] PICC:					[ ] Mediport  [x] Urinary Catheter, Date Placed: 12/1  [x] Necessity of urinary, arterial, and venous catheters discussed    OTHER MEDICATIONS: chlorhexidine 2% Cloths 1 Application(s) Topical <User Schedule>    IMAGING: HISTORY:  75 y/o male w/ a PMHx of HTN, HLD, BPH, nephrolithiasis, varicose veins, s/p bilateral ANGELICA, left iliac artery aneurysm, AAA s/p EVAR who presented on 12/1 for a scheduled repair of a type 2B endoleak of the right limb of his EVAR. Case was c/b Perclosure failure at the end of the case requiring a right femoral cutdown w/ primary repair of the right femoral artery & vein. He was in hemorrhagic shock requiring vasopressor support so he was left intubated at the end of the case. Patient was successfully extubated and weaned off vasopressor support since 12/2. Patient reports right groin incisional pain but otherwise denies headache, weakness, dizziness, fevers, chills, shortness of breath, chest pain, abdominal pain, or nausea/vomiting.    24 HOUR EVENTS:  - Successfully extubated but became hypoxemic afterwards requiring HFNC 50 LPM/50% FiO2  - Episode of vomiting overnight so kept NPO w/ sips & ice chips  - Started on standing Duoneb q6hrs  - Lactate elevated to 3 so given 1 L of crystalloid and lactate cleared to 1.3  - HCT slowly downtrended from 32.7 -> 31.3 -> 29.3 -> 28.5 ->   - Added ISS q6hrs  - Right IJ introducer d/nelly    NEURO  Exam: awake, alert, oriented x4, no acute distress, no acute focal deficits  Meds:  - acetaminophen     Tablet .. 500 milliGRAM(s) Oral every 6 hours PRN Mild Pain (1 - 3)  - traMADol 25 milliGRAM(s) Oral every 6 hours PRN Moderate Pain (4 - 6)    RESPIRATORY  RR: 12 (12-03-21 @ 03:07) (12 - 37)  SpO2: 95% (12-03-21 @ 03:07) (89% - 100%)  Exam: clear to auscultation bilaterally  Meds: albuterol/ipratropium for Nebulization 3 milliLiter(s) Nebulizer every 6 hours    CARDIOVASCULAR  HR: 72 (12-03-21 @ 03:07) (70 - 94)  ABP: 131/66 (12-03-21 @ 03:00) (87/51 - 148/77)  ABP(mean): 87 (12-03-21 @ 03:00) (62 - 100)  Exam: regular rate and rhythm, S1S2  Cardiac Rhythm: sinus  Perfusion    [x]Adequate    [ ]Inadequate  Mentation   [x]Normal       [ ]Reduced  Extremities  [x]Warm         [ ]Cool  Volume Status [ ]Hypervolemic [x]Euvolemic [ ]Hypovolemic    GI/NUTRITION  Exam: soft, nontender, nondistended, right groin dressing w/ minimal strikethrough  Diet: NPO except medications, sips, and ice chips    GENITOURINARY  I&O's Detail  12-02 @ 07:01  -  12-03 @ 06:52  --------------------------------------------------------  IN:    Dexmedetomidine: 79.6 mL    Lactated Ringers: 2500 mL    Phenylephrine: 155.1 mL    Propofol: 39.4 mL  Total IN: 2774.1 mL    OUT:    Indwelling Catheter - Urethral (mL): 900 mL    Nasogastric/Oral tube (mL): 200 mL  Total OUT: 1100 mL    Total NET: 1674.1 mL    139  |  106  |  27<H>  ----------------------------<  129<H>  4.4   |  20<L>  |  1.63<H>    Ca    7.7<L>      02 Dec 2021 12:55  Phos  4.4  Mg     2.0  TPro  4.9<L>  /  Alb  2.9<L>  /  TBili  0.5  /  DBili  x   /  AST  10  /  ALT  7<L>  /  AlkPhos  38<L>    Meds: lactated ringers infuse at 125 mL/hr    HEMATOLOGIC                       9.5    9.96  )-----------( 90       ( 02 Dec 2021 21:05 )             28.5     INFECTIOUS DISEASES  T(C): 37.2 (12-03-21 @ 03:00), Max: 37.7 (12-02-21 @ 15:00)  WBC Count:  - 9.96 K/uL (12-02 @ 21:05)  - 10.52 K/uL (12-02 @ 12:55)    ENDOCRINE  Capillary Blood Glucose:  - 138 mg/dL (02 Dec 2021 17:39)  - 124 mg/dL (02 Dec 2021 11:24)  Meds:  - atorvastatin 40 milliGRAM(s) Oral at bedtime  - insulin lispro (ADMELOG) corrective regimen sliding scale   SubCutaneous every 6 hours    ACCESS DEVICES:  [x] Peripheral IV  [ ] Central Venous Line		[ ] R	[ ] L	[ ] IJ	[ ] Fem	[ ] SC	Placed:   [x] Arterial Line			[x] R	[ ] L	[ ] Fem	[x] Rad	[ ] Ax	Placed: 12/1  [ ] PICC:					[ ] Mediport  [x] Urinary Catheter, Date Placed: 12/1  [x] Necessity of urinary, arterial, and venous catheters discussed    OTHER MEDICATIONS: chlorhexidine 2% Cloths 1 Application(s) Topical <User Schedule>    IMAGING: HISTORY:  77 y/o male w/ a PMHx of HTN, HLD, BPH, nephrolithiasis, varicose veins, s/p bilateral ANGELICA, left iliac artery aneurysm, AAA s/p EVAR who presented on 12/1 for a scheduled repair of a type 2B endoleak of the right limb of his EVAR. Case was c/b Perclosure failure at the end of the case requiring a right femoral cutdown w/ primary repair of the right femoral artery & vein. He was in hemorrhagic shock requiring vasopressor support so he was left intubated at the end of the case. Patient was successfully extubated and weaned off vasopressor support since 12/2. Patient reports right groin incisional pain but otherwise denies headache, weakness, dizziness, fevers, chills, shortness of breath, chest pain, abdominal pain, or nausea/vomiting.    24 HOUR EVENTS:  - Successfully extubated but became hypoxemic afterwards requiring HFNC 50 LPM/50% FiO2  - Episode of vomiting overnight so kept NPO w/ sips & ice chips  - Started on standing Duoneb q6hrs  - Lactate elevated to 3 so given 1 L of crystalloid and lactate cleared to 1.3  - HCT slowly downtrended from 32.7 -> 31.3 -> 29.3 -> 28.5 -> 27.1  - Added ISS q6hrs  - Right IJ introducer d/nelly    NEURO  Exam: awake, alert, oriented x4, no acute distress, no acute focal deficits  Meds:  - acetaminophen     Tablet .. 500 milliGRAM(s) Oral every 6 hours PRN Mild Pain (1 - 3)  - traMADol 25 milliGRAM(s) Oral every 6 hours PRN Moderate Pain (4 - 6)    RESPIRATORY  RR: 12 (12-03-21 @ 03:07) (12 - 37)  SpO2: 95% (12-03-21 @ 03:07) (89% - 100%)  Exam: clear to auscultation bilaterally  Meds: albuterol/ipratropium for Nebulization 3 milliLiter(s) Nebulizer every 6 hours    CARDIOVASCULAR  HR: 72 (12-03-21 @ 03:07) (70 - 94)  ABP: 131/66 (12-03-21 @ 03:00) (87/51 - 148/77)  ABP(mean): 87 (12-03-21 @ 03:00) (62 - 100)  Exam: regular rate and rhythm, S1S2  Cardiac Rhythm: sinus  Perfusion    [x]Adequate    [ ]Inadequate  Mentation   [x]Normal       [ ]Reduced  Extremities  [x]Warm         [ ]Cool  Volume Status [ ]Hypervolemic [x]Euvolemic [ ]Hypovolemic    GI/NUTRITION  Exam: soft, nontender, nondistended, right groin dressing w/ minimal strikethrough  Diet: NPO except medications, sips, and ice chips    GENITOURINARY  I&O's Detail  12-02 @ 07:01  -  12-03 @ 06:52  --------------------------------------------------------  IN:    Dexmedetomidine: 79.6 mL    Lactated Ringers: 2500 mL    Phenylephrine: 155.1 mL    Propofol: 39.4 mL  Total IN: 2774.1 mL    OUT:    Indwelling Catheter - Urethral (mL): 900 mL    Nasogastric/Oral tube (mL): 200 mL  Total OUT: 1100 mL    Total NET: 1674.1 mL    12-03    139  |  105  |  33<H>  ----------------------------<  121<H>  4.2   |  22  |  1.63<H>    Ca    7.9<L>      03 Dec 2021 06:58  Phos  3.5  Mg     2.1    Meds: lactated ringers infuse at 125 mL/hr    HEMATOLOGIC                                9.1    9.52  )-----------( 77       ( 03 Dec 2021 06:58 )             27.1     INFECTIOUS DISEASES  T(C): 37.2 (12-03-21 @ 03:00), Max: 37.7 (12-02-21 @ 15:00)  WBC Count:  - 9.52 K/uL (12-03 @ 06:58)  - 9.96 K/uL (12-02 @ 21:05)  - 10.52 K/uL (12-02 @ 12:55)    ENDOCRINE  Capillary Blood Glucose:  - 138 mg/dL (02 Dec 2021 17:39)  - 124 mg/dL (02 Dec 2021 11:24)  Meds:  - atorvastatin 40 milliGRAM(s) Oral at bedtime  - insulin lispro (ADMELOG) corrective regimen sliding scale   SubCutaneous every 6 hours    ACCESS DEVICES:  [x] Peripheral IV  [ ] Central Venous Line		[ ] R	[ ] L	[ ] IJ	[ ] Fem	[ ] SC	Placed:   [x] Arterial Line			[x] R	[ ] L	[ ] Fem	[x] Rad	[ ] Ax	Placed: 12/1  [ ] PICC:					[ ] Mediport  [x] Urinary Catheter, Date Placed: 12/1  [x] Necessity of urinary, arterial, and venous catheters discussed    OTHER MEDICATIONS: chlorhexidine 2% Cloths 1 Application(s) Topical <User Schedule>    IMAGING:

## 2021-12-03 NOTE — PHYSICAL THERAPY INITIAL EVALUATION ADULT - ADDITIONAL COMMENTS
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

## 2021-12-03 NOTE — DISCHARGE NOTE PROVIDER - NSDCCPCAREPLAN_GEN_ALL_CORE_FT
PRINCIPAL DISCHARGE DIAGNOSIS  Diagnosis: AAA (abdominal aortic aneurysm)  Assessment and Plan of Treatment: WOUND CARE: Staples will be removed in the office.  BATHING: You may shower and/or sponge bathe.  ACTIVITY: No heavy lifting anything more than 10-15lbs or straining. Otherwise, you may return to your usual level of physical activity.  NOTIFY YOUR SURGEON IF: You have any bleeding that does not stop, any fever (over 100.4 F) or chills, any chest pain, shortness of breath, leg numbness/weakness, back pain, or if your pain is not controlled on your discharge pain medications.  FOLLOW-UP:  Please follow up with  in one week regarding your hospitalization.      SECONDARY DISCHARGE DIAGNOSES  Diagnosis: Tracheal stenosis  Assessment and Plan of Treatment:      PRINCIPAL DISCHARGE DIAGNOSIS  Diagnosis: AAA (abdominal aortic aneurysm)  Assessment and Plan of Treatment: WOUND CARE: Staples will be removed in the office.  BATHING: You may shower and/or sponge bathe.  ACTIVITY: No heavy lifting anything more than 10-15lbs or straining. Otherwise, you may return to your usual level of physical activity.  NOTIFY YOUR SURGEON IF: You have any bleeding that does not stop, any fever (over 100.4 F) or chills, any chest pain, shortness of breath, leg numbness/weakness, back pain, or if your pain is not controlled on your discharge pain medications.  FOLLOW-UP:  Please follow up with  in one week upon discharge for your follow up appointment.  Please follow up with your PMD in one week regarding your hospitalization.      SECONDARY DISCHARGE DIAGNOSES  Diagnosis: Tracheal stenosis  Assessment and Plan of Treatment:

## 2021-12-03 NOTE — PHYSICAL THERAPY INITIAL EVALUATION ADULT - GENERAL OBSERVATIONS, REHAB EVAL
received semisupine in bed, A&OX4, following commands, pleasant & eager to participate, s/p repair of endoleak, AAA(12/1), +[place], +freire, +HFNC, +ICU monitoring.

## 2021-12-03 NOTE — PROGRESS NOTE ADULT - ATTENDING COMMENTS
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 Taken to OR with vascular for abdominal aortic aneurysm endoleak repair c/b perclose failure at end of case requiring right femoral cutdown and repair of right femoral artery and vein repair. EBL was 1L, patient resuscitated with 6 u pRBCs, 1u FFP, 1u PLTs, started on phenylephrine gtt intraoperatively.    S/p extubated, saturating well on HFNC, CXR has b/b atelectasis mild pul vascular congestion. H/o tracheal stenosis.   Hypotensive SIRS, vasoparetic partially from high dose sedation  NPO, PPI  HCT have been stable  ISS  Vascular checks  Mechanical DVT ppx  Spoke to wife son  Discussed with vascular surgery 76y Male PMH  HTN, HLD, AAA, S/P repair with stent X 2, found to have tracheal stenosis, CKD stage 3, BPH, S/P cardiac catheterization 2 weeks ago for abnormal stress test, no PCI on medical management. Taken to OR with vascular for abdominal aortic aneurysm endoleak repair c/b perclose failure at end of case requiring right femoral cutdown and repair of right femoral artery and vein repair. EBL was 1L, patient resuscitated with 6 u pRBCs, 1u FFP, 1u PLTs, started on phenylephrine gtt intraoperatively.    S/p extubated, saturating well on HFNC, CXR has b/b atelectasis mild pul vascular congestion. H/o tracheal stenosis.   Hypotension resolved, now hypertensive with tachycardia. Start BB   Decrease IVF, start diuretic  C/O nausea vomiting, keep NPO for now, PPI, check with abd XRAY  HCT have been stable. Monitor thrombocytopenia. Check HIT, pt has been exposed to Heparin in past and in OR  ISS  Vascular checks  Mechanical DVT ppx  Spoke to wife   Discussed with vascular surgery

## 2021-12-03 NOTE — PHYSICAL THERAPY INITIAL EVALUATION ADULT - GAIT DEVIATIONS NOTED, PT EVAL
decreased veronique/increased time in double stance/decreased velocity of limb motion/decreased step length/decreased stride length/decreased weight-shifting ability

## 2021-12-04 LAB
ANION GAP SERPL CALC-SCNC: 10 MMOL/L — SIGNIFICANT CHANGE UP (ref 5–17)
BUN SERPL-MCNC: 31 MG/DL — HIGH (ref 7–23)
CALCIUM SERPL-MCNC: 8.1 MG/DL — LOW (ref 8.4–10.5)
CHLORIDE SERPL-SCNC: 107 MMOL/L — SIGNIFICANT CHANGE UP (ref 96–108)
CO2 SERPL-SCNC: 25 MMOL/L — SIGNIFICANT CHANGE UP (ref 22–31)
CREAT SERPL-MCNC: 1.56 MG/DL — HIGH (ref 0.5–1.3)
GLUCOSE BLDC GLUCOMTR-MCNC: 105 MG/DL — HIGH (ref 70–99)
GLUCOSE SERPL-MCNC: 106 MG/DL — HIGH (ref 70–99)
HCT VFR BLD CALC: 26.4 % — LOW (ref 39–50)
HGB BLD-MCNC: 8.7 G/DL — LOW (ref 13–17)
MAGNESIUM SERPL-MCNC: 2.1 MG/DL — SIGNIFICANT CHANGE UP (ref 1.6–2.6)
MCHC RBC-ENTMCNC: 30.7 PG — SIGNIFICANT CHANGE UP (ref 27–34)
MCHC RBC-ENTMCNC: 33 GM/DL — SIGNIFICANT CHANGE UP (ref 32–36)
MCV RBC AUTO: 93.3 FL — SIGNIFICANT CHANGE UP (ref 80–100)
NRBC # BLD: 0 /100 WBCS — SIGNIFICANT CHANGE UP (ref 0–0)
PHOSPHATE SERPL-MCNC: 2.5 MG/DL — SIGNIFICANT CHANGE UP (ref 2.5–4.5)
PLATELET # BLD AUTO: 79 K/UL — LOW (ref 150–400)
POTASSIUM SERPL-MCNC: 3.9 MMOL/L — SIGNIFICANT CHANGE UP (ref 3.5–5.3)
POTASSIUM SERPL-SCNC: 3.9 MMOL/L — SIGNIFICANT CHANGE UP (ref 3.5–5.3)
RBC # BLD: 2.83 M/UL — LOW (ref 4.2–5.8)
RBC # FLD: 14.2 % — SIGNIFICANT CHANGE UP (ref 10.3–14.5)
SODIUM SERPL-SCNC: 142 MMOL/L — SIGNIFICANT CHANGE UP (ref 135–145)
WBC # BLD: 8.12 K/UL — SIGNIFICANT CHANGE UP (ref 3.8–10.5)
WBC # FLD AUTO: 8.12 K/UL — SIGNIFICANT CHANGE UP (ref 3.8–10.5)

## 2021-12-04 PROCEDURE — 99232 SBSQ HOSP IP/OBS MODERATE 35: CPT

## 2021-12-04 PROCEDURE — 71045 X-RAY EXAM CHEST 1 VIEW: CPT | Mod: 26

## 2021-12-04 RX ORDER — GUAIFENESIN/DEXTROMETHORPHAN 600MG-30MG
10 TABLET, EXTENDED RELEASE 12 HR ORAL THREE TIMES A DAY
Refills: 0 | Status: DISCONTINUED | OUTPATIENT
Start: 2021-12-04 | End: 2021-12-08

## 2021-12-04 RX ORDER — POTASSIUM PHOSPHATE, MONOBASIC POTASSIUM PHOSPHATE, DIBASIC 236; 224 MG/ML; MG/ML
15 INJECTION, SOLUTION INTRAVENOUS ONCE
Refills: 0 | Status: COMPLETED | OUTPATIENT
Start: 2021-12-04 | End: 2021-12-04

## 2021-12-04 RX ORDER — METOPROLOL TARTRATE 50 MG
50 TABLET ORAL
Refills: 0 | Status: DISCONTINUED | OUTPATIENT
Start: 2021-12-04 | End: 2021-12-08

## 2021-12-04 RX ORDER — FUROSEMIDE 40 MG
40 TABLET ORAL ONCE
Refills: 0 | Status: COMPLETED | OUTPATIENT
Start: 2021-12-04 | End: 2021-12-04

## 2021-12-04 RX ORDER — ASPIRIN/CALCIUM CARB/MAGNESIUM 324 MG
81 TABLET ORAL DAILY
Refills: 0 | Status: DISCONTINUED | OUTPATIENT
Start: 2021-12-04 | End: 2021-12-08

## 2021-12-04 RX ADMIN — Medication 3 MILLILITER(S): at 11:34

## 2021-12-04 RX ADMIN — Medication 3 MILLILITER(S): at 17:57

## 2021-12-04 RX ADMIN — ATORVASTATIN CALCIUM 40 MILLIGRAM(S): 80 TABLET, FILM COATED ORAL at 22:32

## 2021-12-04 RX ADMIN — Medication 3 MILLILITER(S): at 00:31

## 2021-12-04 RX ADMIN — TRAMADOL HYDROCHLORIDE 25 MILLIGRAM(S): 50 TABLET ORAL at 21:03

## 2021-12-04 RX ADMIN — Medication 10 MILLILITER(S): at 21:00

## 2021-12-04 RX ADMIN — Medication 81 MILLIGRAM(S): at 17:13

## 2021-12-04 RX ADMIN — Medication 5 MILLIGRAM(S): at 06:25

## 2021-12-04 RX ADMIN — TRAMADOL HYDROCHLORIDE 25 MILLIGRAM(S): 50 TABLET ORAL at 21:45

## 2021-12-04 RX ADMIN — ENOXAPARIN SODIUM 40 MILLIGRAM(S): 100 INJECTION SUBCUTANEOUS at 11:53

## 2021-12-04 RX ADMIN — Medication 3 MILLILITER(S): at 23:15

## 2021-12-04 RX ADMIN — Medication 40 MILLIGRAM(S): at 11:53

## 2021-12-04 RX ADMIN — CHLORHEXIDINE GLUCONATE 1 APPLICATION(S): 213 SOLUTION TOPICAL at 06:25

## 2021-12-04 RX ADMIN — Medication 3 MILLILITER(S): at 06:02

## 2021-12-04 RX ADMIN — Medication 1000 MILLIGRAM(S): at 06:40

## 2021-12-04 RX ADMIN — POTASSIUM PHOSPHATE, MONOBASIC POTASSIUM PHOSPHATE, DIBASIC 62.5 MILLIMOLE(S): 236; 224 INJECTION, SOLUTION INTRAVENOUS at 01:58

## 2021-12-04 RX ADMIN — Medication 50 MILLIGRAM(S): at 17:12

## 2021-12-04 RX ADMIN — SODIUM CHLORIDE 30 MILLILITER(S): 9 INJECTION, SOLUTION INTRAVENOUS at 06:25

## 2021-12-04 RX ADMIN — Medication 400 MILLIGRAM(S): at 06:25

## 2021-12-04 NOTE — PROGRESS NOTE ADULT - ASSESSMENT
75 y/o male w/ a PMHx of HTN, HLD, BPH, nephrolithiasis, varicose veins, s/p bilateral ANGELICA, left iliac artery aneurysm, AAA s/p EVAR presenting s/p repair of type 2B endoleak of the right limb of EVAR stent c/b Perclosure failure requiring a right femoral cutdown w/ primary repair of the right femoral artery & vein, hemorrhagic shock requiring vasopressor support, & acute post-op respiratory insufficiency.     - Continue ASA  - Monitor resp status: Diuresis today  - AM CXR appreciated  - Pain control  - Monitor Right DP/PT signals  - Monitor vital signs  - F/U AM labs  - Monitor CBC re H/H and PLT downtrending  - Appreciate outstanding SICU care    Vascular Surgery  [id number]

## 2021-12-04 NOTE — PROGRESS NOTE ADULT - ASSESSMENT
75 y/o male w/ a PMHx of HTN, HLD, BPH, nephrolithiasis, varicose veins, s/p bilateral ANGELICA, left iliac artery aneurysm, AAA s/p EVAR presenting s/p repair of type 2B endoleak of the right limb of EVAR stent c/b Perclosure failure requiring a right femoral cutdown w/ primary repair of the right femoral artery & vein, hemorrhagic shock requiring vasopressor support, & acute post-op respiratory insufficiency      NEURO:  - IV tylenol, tramadol, dilaudid PRN      RESPIRATORY: H/o tracheal stenosis, intubated with 6.0 ETT in OR  - extubated 12/2 to HFNC 50/50  - CXR in AM  - duonebs Q6    CARDIOVASCULAR: Hemorrhagic shock, resolving s/p resuscitation in OR  - s/p AAA endoleak repair c/b intraop RIGHT fem art/vein injury with fem cutdown and repair  - resuscitated with 6 u pRBC, 1u FFP, 1 u PLT  - off pressors     GI/NUTRITION:  NPO pending bowel function  OGT to suction  protonix qD    GENITOURINARY/RENAL: SHANA on CKD, likely hemodynamically mediated  SCr downtrending  Mojica in place  mIVF qwith LD5/045NS @ 30ml/hr cc/hr  ctt UOP and SCr  electrolytes wnl    HEMATOLOGIC:  Holding chemoppx, SCDs in place  s/p multiple transfusions in OR  trend cbc q8h    INFECTIOUS DISEASE:  s/p Periop ancef x24 hours    ENDOCRINE:  ISS Q6    DISPO:  SICU  full code

## 2021-12-04 NOTE — PROGRESS NOTE ADULT - SUBJECTIVE AND OBJECTIVE BOX
VASCULAR SURGERY PROGRESS NOTE:     SUBJECTIVE/ROS: Patient feels well. He reports appropriate incisional pain.   Denies nausea, vomiting, chest pain.     MEDICATIONS  (STANDING):  albuterol/ipratropium for Nebulization 3 milliLiter(s) Nebulizer every 6 hours  atorvastatin 40 milliGRAM(s) Oral at bedtime  chlorhexidine 2% Cloths 1 Application(s) Topical <User Schedule>  insulin lispro (ADMELOG) corrective regimen sliding scale   SubCutaneous every 6 hours  lactated ringers. 1000 milliLiter(s) (125 mL/Hr) IV Continuous <Continuous>    MEDICATIONS  (PRN):  acetaminophen     Tablet .. 500 milliGRAM(s) Oral every 6 hours PRN Mild Pain (1 - 3)  traMADol 25 milliGRAM(s) Oral every 6 hours PRN Moderate Pain (4 - 6)    OBJECTIVE:  Vital Signs Last 24 Hrs  T(C): 36.9 (04 Dec 2021 07:00), Max: 37.5 (03 Dec 2021 11:00)  T(F): 98.4 (04 Dec 2021 07:00), Max: 99.5 (03 Dec 2021 11:00)  HR: 66 (04 Dec 2021 07:00) (66 - 99)  BP: 121/69 (04 Dec 2021 07:00) (117/66 - 159/83)  BP(mean): 87 (04 Dec 2021 07:00) (86 - 114)  RR: 12 (04 Dec 2021 07:00) (10 - 118)  SpO2: 97% (04 Dec 2021 07:00) (91% - 99%)    PHYSICAL EXAM:  Constitutional: awake, alert, oriented x4, no acute distress, responding appropriately  Respiratory: on high flow NC  Abd: soft, nontender, nondistended, right groin dressing w/ minimal strikethrough  Extremities:  Right/Left + DP/PT signals          LABS:                          8.7    8.12  )-----------( 79       ( 03 Dec 2021 23:57 )             26.4     12-03    142  |  107  |  31<H>  ----------------------------<  106<H>  3.9   |  25  |  1.56<H>    Ca    8.1<L>      03 Dec 2021 23:57  Phos  2.5     12-03  Mg     2.1     12-03    TPro  4.9<L>  /  Alb  2.9<L>  /  TBili  0.5  /  DBili  x   /  AST  10  /  ALT  7<L>  /  AlkPhos  38<L>  12-02      I&O's Detail    03 Dec 2021 07:01  -  04 Dec 2021 07:00  --------------------------------------------------------  IN:    dextrose 5% + sodium chloride 0.45%: 630 mL    IV PiggyBack: 650 mL    Lactated Ringers: 375 mL  Total IN: 1655 mL    OUT:    Indwelling Catheter - Urethral (mL): 2355 mL  Total OUT: 2355 mL    Total NET: -700 mL

## 2021-12-04 NOTE — PROGRESS NOTE ADULT - SUBJECTIVE AND OBJECTIVE BOX
HISTORY  76y Male w/ a PMHx of HTN, HLD, BPH, nephrolithiasis, varicose veins, s/p bilateral ANGELICA, left iliac artery aneurysm, AAA s/p EVAR who presented on 12/1 for a scheduled repair of a type 2B endoleak of the right limb of his EVAR. Case was c/b Perclosure failure at the end of the case requiring a right femoral cutdown w/ primary repair of the right femoral artery & vein. He was in hemorrhagic shock requiring vasopressor support so he was left intubated at the end of the case. Patient was successfully extubated and weaned off vasopressor support since 12/2. Patient reports right groin incisional pain but otherwise denies headache, weakness, dizziness, fevers, chills, shortness of breath, chest pain, abdominal pain, or nausea/vomiting.    24 HOUR EVENTS:  - NPO w zofran  - 20 lasix x2   - thrombocytopenic --> HIT panel sent - negative  - IV metoprolol 5 Q6  - D5 1/2 NS @ 30      SUBJECTIVE/ROS:  [ ] A ten-point review of systems was otherwise negative except as noted.  [ ] Due to altered mental status/intubation, subjective information were not able to be obtained from the patient. History was obtained, to the extent possible, from review of the chart and collateral sources of information.      NEURO  Exam: awake, alert, oriented  Meds: acetaminophen   IVPB .. 1000 milliGRAM(s) IV Intermittent every 6 hours  aspirin Suppository 300 milliGRAM(s) Rectal daily  ondansetron Injectable 4 milliGRAM(s) IV Push every 6 hours PRN Nausea and/or Vomiting  traMADol 25 milliGRAM(s) Oral every 6 hours PRN Moderate Pain (4 - 6)    [x] Adequacy of sedation and pain control has been assessed and adjusted      RESPIRATORY  RR: 15 (12-04-21 @ 01:00) (10 - 118)  SpO2: 95% (12-04-21 @ 01:00) (91% - 99%)  Exam: unlabored, clear to auscultation bilaterally  Mechanical Ventilation: none  ABG - ( 02 Dec 2021 15:35 )  pH: 7.35  /  pCO2: 41    /  pO2: 82    / HCO3: 23    / Base Excess: -2.8  /  SaO2: 97.8    Lactate: x      [N/A] Extubation Readiness Assessed  Meds: albuterol/ipratropium for Nebulization 3 milliLiter(s) Nebulizer every 6 hours        CARDIOVASCULAR  HR: 70 (12-04-21 @ 01:00) (70 - 99)  BP: 135/66 (12-04-21 @ 01:00) (117/66 - 159/83)  BP(mean): 95 (12-04-21 @ 01:00) (86 - 114)  ABP: 170/81 (12-03-21 @ 13:00) (115/60 - 173/81)  ABP(mean): 108 (12-03-21 @ 13:00) (79 - 112)  Exam: regular rate and rhythm  Cardiac Rhythm: sinus  Perfusion     [x]Adequate   [ ]Inadequate  Mentation   [x]Normal       [ ]Reduced  Extremities  [x]Warm         [ ]Cool  Volume Status [ ]Hypervolemic [x]Euvolemic [ ]Hypovolemic  Meds: metoprolol tartrate Injectable 5 milliGRAM(s) IV Push every 6 hours        GI/NUTRITION  Exam: soft, nontender, nondistended  Diet: NPO   Meds: none     GENITOURINARY  I&O's Detail    12-02 @ 07:01  -  12-03 @ 07:00  --------------------------------------------------------  IN:    Dexmedetomidine: 79.6 mL    Lactated Ringers: 3000 mL    Phenylephrine: 155.1 mL    Propofol: 39.4 mL  Total IN: 3274.1 mL    OUT:    Indwelling Catheter - Urethral (mL): 1110 mL    Nasogastric/Oral tube (mL): 200 mL  Total OUT: 1310 mL    Total NET: 1964.1 mL      12-03 @ 07:01  -  12-04 @ 01:59  --------------------------------------------------------  IN:    dextrose 5% + sodium chloride 0.45%: 480 mL    IV PiggyBack: 300 mL    Lactated Ringers: 375 mL  Total IN: 1155 mL    OUT:    Indwelling Catheter - Urethral (mL): 2155 mL  Total OUT: 2155 mL    Total NET: -1000 mL          12-03    142  |  107  |  31<H>  ----------------------------<  106<H>  3.9   |  25  |  1.56<H>    Ca    8.1<L>      03 Dec 2021 23:57  Phos  2.5     12-03  Mg     2.1     12-03    TPro  4.9<L>  /  Alb  2.9<L>  /  TBili  0.5  /  DBili  x   /  AST  10  /  ALT  7<L>  /  AlkPhos  38<L>  12-02    [ ] Mojica catheter, indication: N/A  Meds: dextrose 5% + sodium chloride 0.45%. 1000 milliLiter(s) IV Continuous <Continuous>        HEMATOLOGIC  Meds: enoxaparin Injectable 40 milliGRAM(s) SubCutaneous daily    [x] VTE Prophylaxis                        8.7    8.12  )-----------( 79       ( 03 Dec 2021 23:57 )             26.4       Transfusion     [ ] PRBC   [ ] Platelets   [ ] FFP   [ ] Cryoprecipitate      INFECTIOUS DISEASES  WBC Count: 8.12 K/uL (12-03 @ 23:57)  WBC Count: 10.14 K/uL (12-03 @ 14:13)  WBC Count: 9.52 K/uL (12-03 @ 06:58)    RECENT CULTURES: none    Meds: none      ENDOCRINE  CAPILLARY BLOOD GLUCOSE      POCT Blood Glucose.: 97 mg/dL (03 Dec 2021 22:59)  POCT Blood Glucose.: 101 mg/dL (03 Dec 2021 18:40)  POCT Blood Glucose.: 116 mg/dL (03 Dec 2021 13:14)  POCT Blood Glucose.: 121 mg/dL (03 Dec 2021 07:45)    Meds: atorvastatin 40 milliGRAM(s) Oral at bedtime  insulin lispro (ADMELOG) corrective regimen sliding scale   SubCutaneous every 6 hours        ACCESS DEVICES:  [x] Peripheral IV  [ ] Central Venous Line	[ ] R	[ ] L	[ ] IJ	[ ] Fem	[ ] SC	Placed:   [ ] Arterial Line		[ ] R	[ ] L	[ ] Fem	[ ] Rad	[ ] Ax	Placed:   [ ] PICC:					[ ] Mediport  [ ] Urinary Catheter, Date Placed:   [x] Necessity of urinary, arterial, and venous catheters discussed    OTHER MEDICATIONS:  chlorhexidine 2% Cloths 1 Application(s) Topical <User Schedule>      CODE STATUS: full code

## 2021-12-04 NOTE — PROGRESS NOTE ADULT - ATTENDING COMMENTS
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 Taken to OR with vascular for abdominal aortic aneurysm endoleak repair c/b perclose failure at end of case requiring right femoral cutdown and repair of right femoral artery and vein repair. EBL was 1L, patient resuscitated with 6 u pRBCs, 1u FFP, 1u PLTs, started on phenylephrine gtt intraoperatively.    Awake and alert  Wean off  HFNC to NC. , CXR has b/b atelectasis mild pul vascular congestion unchanged. H/o tracheal stenosis.   Hypertension tachycardia better with BB   Gentle hydration while NPO, continue diuretic  abd XRAY consistent with mild ileus  HCT have been stable. Monitor thrombocytopenia.  HIT neg, on DVT ppx  ISS  Vascular checks  Mechanical DVT ppx  Spoke to wife

## 2021-12-05 LAB
ANION GAP SERPL CALC-SCNC: 9 MMOL/L — SIGNIFICANT CHANGE UP (ref 5–17)
BUN SERPL-MCNC: 25 MG/DL — HIGH (ref 7–23)
CALCIUM SERPL-MCNC: 8 MG/DL — LOW (ref 8.4–10.5)
CHLORIDE SERPL-SCNC: 105 MMOL/L — SIGNIFICANT CHANGE UP (ref 96–108)
CO2 SERPL-SCNC: 26 MMOL/L — SIGNIFICANT CHANGE UP (ref 22–31)
CREAT SERPL-MCNC: 1.24 MG/DL — SIGNIFICANT CHANGE UP (ref 0.5–1.3)
GLUCOSE SERPL-MCNC: 106 MG/DL — HIGH (ref 70–99)
HCT VFR BLD CALC: 25 % — LOW (ref 39–50)
HGB BLD-MCNC: 8.1 G/DL — LOW (ref 13–17)
MAGNESIUM SERPL-MCNC: 2.1 MG/DL — SIGNIFICANT CHANGE UP (ref 1.6–2.6)
MCHC RBC-ENTMCNC: 30 PG — SIGNIFICANT CHANGE UP (ref 27–34)
MCHC RBC-ENTMCNC: 32.4 GM/DL — SIGNIFICANT CHANGE UP (ref 32–36)
MCV RBC AUTO: 92.6 FL — SIGNIFICANT CHANGE UP (ref 80–100)
NRBC # BLD: 0 /100 WBCS — SIGNIFICANT CHANGE UP (ref 0–0)
PHOSPHATE SERPL-MCNC: 2.4 MG/DL — LOW (ref 2.5–4.5)
PLATELET # BLD AUTO: 92 K/UL — LOW (ref 150–400)
POTASSIUM SERPL-MCNC: 3.8 MMOL/L — SIGNIFICANT CHANGE UP (ref 3.5–5.3)
POTASSIUM SERPL-SCNC: 3.8 MMOL/L — SIGNIFICANT CHANGE UP (ref 3.5–5.3)
RBC # BLD: 2.7 M/UL — LOW (ref 4.2–5.8)
RBC # FLD: 13.6 % — SIGNIFICANT CHANGE UP (ref 10.3–14.5)
SODIUM SERPL-SCNC: 140 MMOL/L — SIGNIFICANT CHANGE UP (ref 135–145)
WBC # BLD: 6.77 K/UL — SIGNIFICANT CHANGE UP (ref 3.8–10.5)
WBC # FLD AUTO: 6.77 K/UL — SIGNIFICANT CHANGE UP (ref 3.8–10.5)

## 2021-12-05 PROCEDURE — 99232 SBSQ HOSP IP/OBS MODERATE 35: CPT

## 2021-12-05 PROCEDURE — 71045 X-RAY EXAM CHEST 1 VIEW: CPT | Mod: 26

## 2021-12-05 RX ORDER — FUROSEMIDE 40 MG
20 TABLET ORAL DAILY
Refills: 0 | Status: COMPLETED | OUTPATIENT
Start: 2021-12-05 | End: 2021-12-07

## 2021-12-05 RX ORDER — POTASSIUM CHLORIDE 20 MEQ
20 PACKET (EA) ORAL DAILY
Refills: 0 | Status: COMPLETED | OUTPATIENT
Start: 2021-12-05 | End: 2021-12-07

## 2021-12-05 RX ORDER — ACETAMINOPHEN 500 MG
650 TABLET ORAL EVERY 6 HOURS
Refills: 0 | Status: DISCONTINUED | OUTPATIENT
Start: 2021-12-05 | End: 2021-12-08

## 2021-12-05 RX ORDER — POTASSIUM CHLORIDE 20 MEQ
20 PACKET (EA) ORAL ONCE
Refills: 0 | Status: COMPLETED | OUTPATIENT
Start: 2021-12-05 | End: 2021-12-05

## 2021-12-05 RX ORDER — POTASSIUM PHOSPHATE, MONOBASIC POTASSIUM PHOSPHATE, DIBASIC 236; 224 MG/ML; MG/ML
15 INJECTION, SOLUTION INTRAVENOUS ONCE
Refills: 0 | Status: COMPLETED | OUTPATIENT
Start: 2021-12-05 | End: 2021-12-05

## 2021-12-05 RX ADMIN — TRAMADOL HYDROCHLORIDE 25 MILLIGRAM(S): 50 TABLET ORAL at 03:10

## 2021-12-05 RX ADMIN — Medication 3 MILLILITER(S): at 17:13

## 2021-12-05 RX ADMIN — Medication 100 MILLIGRAM(S): at 13:35

## 2021-12-05 RX ADMIN — POTASSIUM PHOSPHATE, MONOBASIC POTASSIUM PHOSPHATE, DIBASIC 62.5 MILLIMOLE(S): 236; 224 INJECTION, SOLUTION INTRAVENOUS at 09:58

## 2021-12-05 RX ADMIN — ATORVASTATIN CALCIUM 40 MILLIGRAM(S): 80 TABLET, FILM COATED ORAL at 21:36

## 2021-12-05 RX ADMIN — Medication 50 MILLIGRAM(S): at 05:17

## 2021-12-05 RX ADMIN — CHLORHEXIDINE GLUCONATE 1 APPLICATION(S): 213 SOLUTION TOPICAL at 05:16

## 2021-12-05 RX ADMIN — Medication 20 MILLIEQUIVALENT(S): at 11:39

## 2021-12-05 RX ADMIN — Medication 3 MILLILITER(S): at 13:36

## 2021-12-05 RX ADMIN — Medication 20 MILLIEQUIVALENT(S): at 05:16

## 2021-12-05 RX ADMIN — Medication 81 MILLIGRAM(S): at 11:39

## 2021-12-05 RX ADMIN — Medication 100 MILLIGRAM(S): at 21:35

## 2021-12-05 RX ADMIN — TRAMADOL HYDROCHLORIDE 25 MILLIGRAM(S): 50 TABLET ORAL at 03:55

## 2021-12-05 RX ADMIN — Medication 62.5 MILLIMOLE(S): at 05:59

## 2021-12-05 RX ADMIN — Medication 100 MILLIGRAM(S): at 07:58

## 2021-12-05 RX ADMIN — Medication 20 MILLIGRAM(S): at 11:39

## 2021-12-05 RX ADMIN — ENOXAPARIN SODIUM 40 MILLIGRAM(S): 100 INJECTION SUBCUTANEOUS at 11:40

## 2021-12-05 RX ADMIN — Medication 50 MILLIGRAM(S): at 17:13

## 2021-12-05 NOTE — PROGRESS NOTE ADULT - ASSESSMENT
77 y/o male w/ a PMHx of HTN, HLD, BPH, nephrolithiasis, varicose veins, s/p bilateral ANGELICA, left iliac artery aneurysm, AAA s/p EVAR presenting s/p repair of type 2B endoleak of the right limb of EVAR stent c/b Perclosure failure requiring a right femoral cutdown w/ primary repair of the right femoral artery & vein, hemorrhagic shock requiring vasopressor support, & acute post-op respiratory insufficiency.     - Continue ASA  - Advance diet  - Monitor resp status: Diuresis today  - Pain control  - Monitor Right DP/PT signals  - Monitor vital signs  - F/U AM labs  - Monitor CBC re H/H and PLT downtrending  - Appreciate outstanding SICU care    Vascular Surgery  [id number]

## 2021-12-05 NOTE — PROGRESS NOTE ADULT - SUBJECTIVE AND OBJECTIVE BOX
VASCULAR SURGERY PROGRESS NOTE:     SUBJECTIVE/ROS: Patient feels well. He reports appropriate incisional pain.   Denies nausea, vomiting, chest pain. Complains of throat pain and dry cough. Tolerating CLD.     OBJECTIVE:  Vital Signs Last 24 Hrs  T(C): 36.5 (05 Dec 2021 07:00), Max: 36.9 (04 Dec 2021 23:00)  T(F): 97.7 (05 Dec 2021 07:00), Max: 98.5 (04 Dec 2021 23:00)  HR: 77 (05 Dec 2021 10:00) (66 - 87)  BP: 137/75 (05 Dec 2021 10:00) (111/58 - 178/80)  BP(mean): 100 (05 Dec 2021 10:00) (82 - 120)  RR: 16 (05 Dec 2021 10:00) (13 - 23)  SpO2: 90% (05 Dec 2021 10:00) (90% - 100%)    PHYSICAL EXAM:  Constitutional: awake, alert, oriented x4, no acute distress, responding appropriately  Respiratory: on high flow NC  Abd: soft, nontender, nondistended, right groin dressing w/ minimal strikethrough  Extremities:  Right/Left + DP/PT signals      MEDICATIONS  (STANDING):  albuterol/ipratropium for Nebulization 3 milliLiter(s) Nebulizer every 6 hours  atorvastatin 40 milliGRAM(s) Oral at bedtime  chlorhexidine 2% Cloths 1 Application(s) Topical <User Schedule>  insulin lispro (ADMELOG) corrective regimen sliding scale   SubCutaneous every 6 hours  lactated ringers. 1000 milliLiter(s) (125 mL/Hr) IV Continuous <Continuous>    MEDICATIONS  (PRN):  acetaminophen     Tablet .. 500 milliGRAM(s) Oral every 6 hours PRN Mild Pain (1 - 3)  traMADol 25 milliGRAM(s) Oral every 6 hours PRN Moderate Pain (4 - 6)      LABS:                        8.1    6.77  )-----------( 92       ( 05 Dec 2021 03:32 )             25.0     12-05    140  |  105  |  25<H>  ----------------------------<  106<H>  3.8   |  26  |  1.24    Ca    8.0<L>      05 Dec 2021 03:32  Phos  2.4     12-05  Mg     2.1     12-05    I&O's Detail    04 Dec 2021 07:01  -  05 Dec 2021 07:00  --------------------------------------------------------  IN:    dextrose 5% + sodium chloride 0.45%: 180 mL  Total IN: 180 mL    OUT:    Indwelling Catheter - Urethral (mL): 80 mL    Voided (mL): 2450 mL  Total OUT: 2530 mL    Total NET: -2350 mL                                               VASCULAR SURGERY PROGRESS NOTE:     SUBJECTIVE/ROS: Patient feels well. He reports appropriate incisional pain.   Denies nausea, vomiting, chest pain. Complains of throat pain and dry cough. Tolerating CLD.     OBJECTIVE:  Vital Signs Last 24 Hrs  T(C): 36.5 (05 Dec 2021 07:00), Max: 36.9 (04 Dec 2021 23:00)  T(F): 97.7 (05 Dec 2021 07:00), Max: 98.5 (04 Dec 2021 23:00)  HR: 77 (05 Dec 2021 10:00) (66 - 87)  BP: 137/75 (05 Dec 2021 10:00) (111/58 - 178/80)  BP(mean): 100 (05 Dec 2021 10:00) (82 - 120)  RR: 16 (05 Dec 2021 10:00) (13 - 23)  SpO2: 90% (05 Dec 2021 10:00) (90% - 100%)    PHYSICAL EXAM:  Constitutional: awake, alert, oriented x4, no acute distress, responding appropriately  Respiratory: on high flow NC  Abd: soft, nontender, nondistended, right groin dressing w/ minimal strikethrough  Extremities:  Right/Left + DP/PT signals      MEDICATIONS  (STANDING):  albuterol/ipratropium for Nebulization 3 milliLiter(s) Nebulizer every 6 hours  atorvastatin 40 milliGRAM(s) Oral at bedtime  chlorhexidine 2% Cloths 1 Application(s) Topical <User Schedule>  insulin lispro (ADMELOG) corrective regimen sliding scale   SubCutaneous every 6 hours  lactated ringers. 1000 milliLiter(s) (125 mL/Hr) IV Continuous <Continuous>    MEDICATIONS  (PRN):  acetaminophen     Tablet .. 500 milliGRAM(s) Oral every 6 hours PRN Mild Pain (1 - 3)  traMADol 25 milliGRAM(s) Oral every 6 hours PRN Moderate Pain (4 - 6)      LABS:                        8.1    6.77  )-----------( 92       ( 05 Dec 2021 03:32 )             25.0     12-05    140  |  105  |  25<H>  ----------------------------<  106<H>  3.8   |  26  |  1.24    Ca    8.0<L>      05 Dec 2021 03:32  Phos  2.4     12-05  Mg     2.1     12-05    I&O's Detail    04 Dec 2021 07:01  -  05 Dec 2021 07:00  --------------------------------------------------------  IN:    dextrose 5% + sodium chloride 0.45%: 180 mL  Total IN: 180 mL    OUT:    Indwelling Catheter - Urethral (mL): 80 mL    Voided (mL): 2450 mL  Total OUT: 2530 mL    Total NET: -2350 mL

## 2021-12-05 NOTE — PROGRESS NOTE ADULT - ATTENDING COMMENTS
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 Taken to OR with vascular for abdominal aortic aneurysm endoleak repair c/b perclose failure at end of case requiring right femoral cutdown and repair of right femoral artery and vein repair. EBL was 1L, patient resuscitated with 6 u pRBCs, 1u FFP, 1u PLTs, started on phenylephrine gtt intraoperatively.    Awake and alert  Off HFNC, CXR resolving b/b atelectasis pul vascular congestion,  H/o tracheal stenosis.   Hypertension tachycardia improved with BB   DC IVF, continue gentle diuretic  HCT have been stable, improving thrombocytopenia.  HIT neg, on DVT ppx  ISS  Start pharm DVT ppx  Spoke to wife, kept her updated  Transfer

## 2021-12-05 NOTE — PROGRESS NOTE ADULT - ASSESSMENT
Patient is 75 y/o male w/ a PMHx of HTN, HLD, BPH, nephrolithiasis, varicose veins, s/p bilateral ANGELICA, left iliac artery aneurysm, AAA s/p EVAR presenting s/p repair of type 2B endoleak of the right limb of EVAR stent c/b Perclosure failure requiring a right femoral cutdown w/ primary repair of the right femoral artery & vein, hemorrhagic shock requiring vasopressor support, remain intubated. Patient is now extubated to NC.      Neuro: acute post-op pain  -pain control tramadol as needed    Resp: now extubated to NC, c/o cough  -weaned off HFNC to NC today after lasix  - guaifenesin prn, received 1 dose of hycodan  -duoneb q6h  - Out of bed to chair and incentive spirometry to prevent atelectasis    CV: type 2B endoleak of the right limb of EVAR stent s/p repair c/b Perclosure failure s/p right femoral cutdown w/ primary repair of the right femoral artery & vein, hemorrhagic shock (resolved), history of HTN  - Neurovascular checks q1hrs of RLE  - Holding home ASA given recent bleed   -restarted home metoprolol 50mg q12h with holding  -given lasix 20mg x2   - Holding home , valsatran, and HCTZ, restart when BP climbing up    GI:  - advanced to CLD today  - Will start bowel regimen with senna & Miralax    Renal: no acute issues  - IVL today since CLD  - lasix 20mg x2  -d/c freire today, pass TOV    Heme: acute blood loss anemia  - Monitor CBC and coags  - Venodynes for mechanical VTE prophylaxis; holding chemical VTE prophylaxis given recent bleed    ID: no abx  - Monitor for clinical evidence of active infection    Endo: HLD, hyperglycemia  - Monitor glucose w/ ISS q6hrs for glycemic control; HgbA1C 5.6%  - Home atorvastatin for HLD

## 2021-12-05 NOTE — PROGRESS NOTE ADULT - SUBJECTIVE AND OBJECTIVE BOX
24 HOUR EVENTS:  - CLD  - 20 Lasix x 2   - Thrombocytopenic --> HIT panel sent - negative  - IV metoprolol 5mg Q6H  -IVL  - ASA started  - weaned to NC        HISTORY:  Patient is a 77 y/o male w/ a PMHx of HTN, HLD, BPH, nephrolithiasis, varicose veins, s/p bilateral ANGELICA, left iliac artery aneurysm, AAA s/p EVAR who presented on 12/1 for a scheduled repair of a type 2B endoleak of the right limb of his EVAR. Case was c/b Perclosure failure at the end of the case requiring a right femoral cutdown w/ primary repair of the right femoral artery & vein. He was in hemorrhagic shock requiring vasopressor support so he was left intubated at the end of the case, then transferred to SICU for further hemodynamic monitoring. Patient was successfully extubated and weaned off vasopressors 12/2.    SUBJECTIVE/ROS:  [ x] A ten-point review of systems was otherwise negative except as noted.  [ ] Due to altered mental status/intubation, subjective information were not able to be obtained from the patient. History was obtained, to the extent possible, from review of the chart and collateral sources of information.      NEURO  Exam: awake, alert, oriented  Meds: ondansetron Injectable 4 milliGRAM(s) IV Push every 6 hours PRN Nausea and/or Vomiting  traMADol 25 milliGRAM(s) Oral every 6 hours PRN Moderate Pain (4 - 6)    [x] Adequacy of sedation and pain control has been assessed and adjusted      RESPIRATORY  RR: 17 (12-05-21 @ 02:00) (12 - 36)  SpO2: 95% (12-05-21 @ 02:00) (93% - 100%)  Exam: unlabored, clear to auscultation bilaterally  Mechanical Ventilation:     [N/A] Extubation Readiness Assessed  Meds: albuterol/ipratropium for Nebulization 3 milliLiter(s) Nebulizer every 6 hours  guaifenesin/dextromethorphan Oral Liquid 10 milliLiter(s) Oral three times a day PRN Chronic cough        CARDIOVASCULAR  HR: 76 (12-05-21 @ 02:00) (63 - 87)  BP: 122/63 (12-05-21 @ 02:00) (111/58 - 172/80)  BP(mean): 88 (12-05-21 @ 02:00) (82 - 115)    Exam: regular rate and rhythm  Cardiac Rhythm: sinus  Perfusion     [x]Adequate   [ ]Inadequate  Mentation   [x]Normal       [ ]Reduced  Extremities  [x]Warm         [ ]Cool  Volume Status [ ]Hypervolemic [x]Euvolemic [ ]Hypovolemic  Meds: metoprolol tartrate 50 milliGRAM(s) Oral two times a day        GI/NUTRITION  Exam: soft, nontender, nondistended, incision C/D/I  Diet: CLD  Meds:     GENITOURINARY  I&O's Detail    12-03 @ 07:01  -  12-04 @ 07:00  --------------------------------------------------------  IN:    dextrose 5% + sodium chloride 0.45%: 630 mL    IV PiggyBack: 650 mL    Lactated Ringers: 375 mL  Total IN: 1655 mL    OUT:    Indwelling Catheter - Urethral (mL): 2355 mL  Total OUT: 2355 mL    Total NET: -700 mL      12-04 @ 07:01  -  12-05 @ 02:27  --------------------------------------------------------  IN:    dextrose 5% + sodium chloride 0.45%: 180 mL  Total IN: 180 mL    OUT:    Indwelling Catheter - Urethral (mL): 80 mL    Voided (mL): 1850 mL  Total OUT: 1930 mL    Total NET: -1750 mL          12-03    142  |  107  |  31<H>  ----------------------------<  106<H>  3.9   |  25  |  1.56<H>    Ca    8.1<L>      03 Dec 2021 23:57  Phos  2.5     12-03  Mg     2.1     12-03      [ ] Mojica catheter, indication: N/A  Meds:       HEMATOLOGIC  Meds: aspirin  chewable 81 milliGRAM(s) Oral daily  enoxaparin Injectable 40 milliGRAM(s) SubCutaneous daily    [x] VTE Prophylaxis                        8.7    8.12  )-----------( 79       ( 03 Dec 2021 23:57 )             26.4       Transfusion     [ ] PRBC   [ ] Platelets   [ ] FFP   [ ] Cryoprecipitate      INFECTIOUS DISEASES    RECENT CULTURES:    Meds:       ENDOCRINE  CAPILLARY BLOOD GLUCOSE      POCT Blood Glucose.: 105 mg/dL (04 Dec 2021 05:53)    Meds: atorvastatin 40 milliGRAM(s) Oral at bedtime        ACCESS DEVICES:  [x ] Peripheral IV  [ ] Central Venous Line	[ ] R	[ ] L	[ ] IJ	[ ] Fem	[ ] SC	Placed:   [ ] Arterial Line		[ ] R	[ ] L	[ ] Fem	[ ] Rad	[ ] Ax	Placed:   [ ] PICC:					[ ] Mediport  [ ] Urinary Catheter, Date Placed:   [x] Necessity of urinary, arterial, and venous catheters discussed    OTHER MEDICATIONS:  chlorhexidine 2% Cloths 1 Application(s) Topical <User Schedule>      CODE STATUS:    full code

## 2021-12-05 NOTE — PROGRESS NOTE ADULT - SUBJECTIVE AND OBJECTIVE BOX
HISTORY  76y Male w/ a PMHx of HTN, HLD, BPH, nephrolithiasis, varicose veins, s/p bilateral ANGELICA, left iliac artery aneurysm, AAA s/p EVAR who presented on 12/1 for a scheduled repair of a type 2B endoleak of the right limb of his EVAR. Case was c/b Perclosure failure at the end of the case requiring a right femoral cutdown w/ primary repair of the right femoral artery & vein. He was in hemorrhagic shock requiring vasopressor support so he was left intubated at the end of the case. Patient was successfully extubated and weaned off vasopressor support since 12/2. Patient reports right groin incisional pain but otherwise denies headache, weakness, dizziness, fevers, chills, shortness of breath, chest pain, abdominal pain, or nausea/vomiting.    Patient currently denies headache, dizziness, weakness, fevers, chills, shortness of breath, chest pain, abdominal pain, or nausea/vomiting.    24 HOUR EVENTS:  - Diet advanced to clears  - Diuresis with Lasix 20mg IV x 2   - Weaned off HFNC to 4LNC    SUBJECTIVE/ROS:  A ten-point review of systems was otherwise negative except as noted.    NEURO  RASS: 0    GCS: 15    CAM ICU: Negative  Exam: awake, alert, oriented x4, no acute distress, no acute focal deficits, follows complex commands, moving all four extremities  Meds: ondansetron Injectable 4 milliGRAM(s) IV Push every 6 hours PRN Nausea and/or Vomiting  traMADol 25 milliGRAM(s) Oral every 6 hours PRN Moderate Pain (4 - 6)      RESPIRATORY  RR: 17 (12-05-21 @ 02:00) (12 - 36)  SpO2: 95% (12-05-21 @ 02:00) (93% - 100%)  Wt(kg): --  Exam: clear to auscultation bilaterally coarse rhonchi in all lung fields  Mechanical Ventilation:     Meds: albuterol/ipratropium for Nebulization 3 milliLiter(s) Nebulizer every 6 hours  guaifenesin/dextromethorphan Oral Liquid 10 milliLiter(s) Oral three times a day PRN Chronic cough      CARDIOVASCULAR  HR: 76 (12-05-21 @ 02:00) (63 - 87)  BP: 122/63 (12-05-21 @ 02:00) (111/58 - 172/80)  BP(mean): 88 (12-05-21 @ 02:00) (82 - 115)  ABP: --  ABP(mean): --  Wt(kg): --  CVP(cm H2O): --      Exam: regular rate and rhythm, regular rhythm, tachycardic, S1S2, irregularly irregular  Cardiac Rhythm: sinus sinus tachycardia atrial fibrillation  Perfusion     [ ]Adequate   [ ]Inadequate  Mentation   [ ]Normal       [ ]Reduced  Extremities  [ ]Warm         [ ]Cool  Volume Status [ ]Hypervolemic [ ]Euvolemic [ ]Hypovolemic  Meds: metoprolol tartrate 50 milliGRAM(s) Oral two times a day      GI/NUTRITION  Exam: soft, nontender, nondistended, softly distended, ugo-incisional tenderness, incision dressing C/D/I, NGT output, drain output  Diet:  Meds:     GENITOURINARY  I&O's Detail    12-03 @ 07:01  -  12-04 @ 07:00  --------------------------------------------------------  IN:    dextrose 5% + sodium chloride 0.45%: 630 mL    IV PiggyBack: 650 mL    Lactated Ringers: 375 mL  Total IN: 1655 mL    OUT:    Indwelling Catheter - Urethral (mL): 2355 mL  Total OUT: 2355 mL    Total NET: -700 mL      12-04 @ 07:01  -  12-05 @ 02:30  --------------------------------------------------------  IN:    dextrose 5% + sodium chloride 0.45%: 180 mL  Total IN: 180 mL    OUT:    Indwelling Catheter - Urethral (mL): 80 mL    Voided (mL): 1850 mL  Total OUT: 1930 mL    Total NET: -1750 mL          12-03    142  |  107  |  31<H>  ----------------------------<  106<H>  3.9   |  25  |  1.56<H>    Ca    8.1<L>      03 Dec 2021 23:57  Phos  2.5     12-03  Mg     2.1     12-03      [ ] Mojica catheter, indication:   Meds:     HEMATOLOGIC  Meds: aspirin  chewable 81 milliGRAM(s) Oral daily  enoxaparin Injectable 40 milliGRAM(s) SubCutaneous daily    [ ] VTE Prophylaxis - held due to                         8.7    8.12  )-----------( 79       ( 03 Dec 2021 23:57 )             26.4         INFECTIOUS DISEASES  T(C): 36.9 (12-04-21 @ 23:00), Max: 36.9 (12-04-21 @ 07:00)  Wt(kg): --    Recent Cultures:    Meds:     ENDOCRINE  Capillary Blood Glucose    Meds: atorvastatin 40 milliGRAM(s) Oral at bedtime      ACCESS DEVICES:  [ ] Peripheral IV  [ ] Central Venous Line	[ ] R	[ ] L	[ ] IJ	[ ] Fem	[ ] SC	Placed:   [ ] Arterial Line		[ ] R	[ ] L	[ ] Fem	[ ] Rad	[ ] Ax	Placed:   [ ] PICC:					[ ] Mediport  [ ] Urinary Catheter, Date Placed:   [ ] Necessity of urinary, arterial, and venous catheters discussed    OTHER MEDICATIONS:  chlorhexidine 2% Cloths 1 Application(s) Topical <User Schedule>      IMAGING:

## 2021-12-05 NOTE — CHART NOTE - NSCHARTNOTEFT_GEN_A_CORE
SICU Transfer Note    Transfer from: SICU  Transfer to: [address] [place] [address]  Accepting physican: Dr. Marquez    SICU COURSE:  76y Male w/ a PMHx of HTN, HLD, BPH, nephrolithiasis, varicose veins, s/p bilateral ANGELICA, left iliac artery aneurysm, AAA s/p EVAR who presented on 12/1 for a scheduled repair of a type 2B endoleak of the right limb of his EVAR. Case was c/b Perclosure failure at the end of the case requiring a right femoral cutdown w/ primary repair of the right femoral artery & vein. EBL was 1L, patient resuscitated with 6 u pRBCs, 1u FFP, 1u PLTs, started on phenylephrine gtt intraoperatively, was left intubated at the end of the case and transferred to SICU for postop ventilator management and HD support/monitoring ON 12/01. Patient was successfully extubated and weaned off vasopressor support on 12/2, but became hypoxemic afterwards requiring HFNC 50 LPM/50% FiO2. Patient had an episode of vomiting overnight so was kept NPO w/ sips & ice chips, was started on standing Duoneb q6hrs. His lactate was elevated to 3 so given 1 L of crystalloid and lactate cleared to 1.3. HCT slowly downtrended from 32.7 -> 31.3 -> 29.3 -> 28.5 -> 27.1 and PLT downtrended with HIT panel negative. CXR on 12/04 revealed small bilateral pleural effusions, left greater than right and Lasix 20x2 was given. Patient reports right groin incisional pain. throat pain , drycough, but otherwise denies headache, weakness, dizziness, fevers, chills, shortness of breath, chest pain, abdominal pain, or nausea/vomiting and is tolerating a CLD.    Vital Signs Last 24 Hrs  T(C): 36.9 (05 Dec 2021 11:00), Max: 36.9 (04 Dec 2021 23:00)  T(F): 98.4 (05 Dec 2021 11:00), Max: 98.5 (04 Dec 2021 23:00)  HR: 76 (05 Dec 2021 11:00) (66 - 87)  BP: 148/67 (05 Dec 2021 11:00) (111/58 - 178/80)  BP(mean): 97 (05 Dec 2021 11:00) (82 - 120)  RR: 19 (05 Dec 2021 11:00) (13 - 23)  SpO2: 95% (05 Dec 2021 11:00) (90% - 100%)    PHYSICAL EXAM:  Constitutional: awake, alert, oriented x4, no acute distress, responding appropriately  Respiratory: on high flow NC  Abd: soft, nontender, nondistended, right groin dressing w/ minimal strikethrough  Extremities:  Right/Left + DP/PT signals    I&O's Summary    04 Dec 2021 07:01  -  05 Dec 2021 07:00  --------------------------------------------------------  IN: 180 mL / OUT: 2530 mL / NET: -2350 mL      MEDICATIONS  (STANDING):  albuterol/ipratropium for Nebulization 3 milliLiter(s) Nebulizer every 6 hours  aspirin  chewable 81 milliGRAM(s) Oral daily  atorvastatin 40 milliGRAM(s) Oral at bedtime  benzonatate 100 milliGRAM(s) Oral every 8 hours  chlorhexidine 2% Cloths 1 Application(s) Topical <User Schedule>  enoxaparin Injectable 40 milliGRAM(s) SubCutaneous daily  furosemide    Tablet 20 milliGRAM(s) Oral daily  metoprolol tartrate 50 milliGRAM(s) Oral two times a day  potassium chloride    Tablet ER 20 milliEquivalent(s) Oral daily    MEDICATIONS  (PRN):  acetaminophen     Tablet .. 650 milliGRAM(s) Oral every 6 hours PRN Mild Pain (1 - 3)  guaifenesin/dextromethorphan Oral Liquid 10 milliLiter(s) Oral three times a day PRN Chronic cough  ondansetron Injectable 4 milliGRAM(s) IV Push every 6 hours PRN Nausea and/or Vomiting  traMADol 25 milliGRAM(s) Oral every 6 hours PRN Moderate Pain (4 - 6)      LABS                                            8.1                   Neurophils% (auto):   x      (12-05 @ 03:32):    6.77 )-----------(92           Lymphocytes% (auto):  x                                             25.0                   Eosinphils% (auto):   x        Manual%: Neutrophils x    ; Lymphocytes x    ; Eosinophils x    ; Bands%: x    ; Blasts x                                    140    |  105    |  25                  Calcium: 8.0   / iCa: x      (12-05 @ 03:32)    ----------------------------<  106       Magnesium: 2.1                              3.8     |  26     |  1.24             Phosphorous: 2.4            ASSESSMENT:  75 y/o male w/ a PMHx of HTN, HLD, BPH, nephrolithiasis, varicose veins, s/p bilateral ANGELICA, left iliac artery aneurysm, AAA s/p EVAR presenting s/p repair of type 2B endoleak of the right limb of EVAR stent c/b Perclosure failure requiring a right femoral cutdown w/ primary repair of the right femoral artery & vein, hemorrhagic shock requiring vasopressor support, & acute post-op respiratory insufficiency. Patient was extubated, weaned off pressors, and is now being transferred to the floor.      Plan:  - Continue ASA  - Advance diet  - Monitor resp status: Diuresis today  - Pain control  - Tessalon for throat pain/dry cough  - Lasix 20mg qd for 3 days  - Monitor Right DP/PT signals  - Monitor vital signs  - Monitor urine output  - F/U AM labs  - Monitor CBC re H/H and PLT downtrending  - Lovenox 40 mg 1d for DVT ppx    Vascular Surgery   [id number]

## 2021-12-06 LAB
ANION GAP SERPL CALC-SCNC: 11 MMOL/L — SIGNIFICANT CHANGE UP (ref 5–17)
BUN SERPL-MCNC: 20 MG/DL — SIGNIFICANT CHANGE UP (ref 7–23)
CALCIUM SERPL-MCNC: 8.1 MG/DL — LOW (ref 8.4–10.5)
CHLORIDE SERPL-SCNC: 102 MMOL/L — SIGNIFICANT CHANGE UP (ref 96–108)
CO2 SERPL-SCNC: 24 MMOL/L — SIGNIFICANT CHANGE UP (ref 22–31)
CREAT SERPL-MCNC: 1.11 MG/DL — SIGNIFICANT CHANGE UP (ref 0.5–1.3)
GLUCOSE SERPL-MCNC: 123 MG/DL — HIGH (ref 70–99)
HCT VFR BLD CALC: 25.3 % — LOW (ref 39–50)
HGB BLD-MCNC: 8.3 G/DL — LOW (ref 13–17)
MAGNESIUM SERPL-MCNC: 2.1 MG/DL — SIGNIFICANT CHANGE UP (ref 1.6–2.6)
MCHC RBC-ENTMCNC: 30.4 PG — SIGNIFICANT CHANGE UP (ref 27–34)
MCHC RBC-ENTMCNC: 32.8 GM/DL — SIGNIFICANT CHANGE UP (ref 32–36)
MCV RBC AUTO: 92.7 FL — SIGNIFICANT CHANGE UP (ref 80–100)
NRBC # BLD: 0 /100 WBCS — SIGNIFICANT CHANGE UP (ref 0–0)
PHOSPHATE SERPL-MCNC: 2.1 MG/DL — LOW (ref 2.5–4.5)
PLATELET # BLD AUTO: 100 K/UL — LOW (ref 150–400)
POTASSIUM SERPL-MCNC: 3.6 MMOL/L — SIGNIFICANT CHANGE UP (ref 3.5–5.3)
POTASSIUM SERPL-SCNC: 3.6 MMOL/L — SIGNIFICANT CHANGE UP (ref 3.5–5.3)
RBC # BLD: 2.73 M/UL — LOW (ref 4.2–5.8)
RBC # FLD: 13.4 % — SIGNIFICANT CHANGE UP (ref 10.3–14.5)
SODIUM SERPL-SCNC: 137 MMOL/L — SIGNIFICANT CHANGE UP (ref 135–145)
WBC # BLD: 6.88 K/UL — SIGNIFICANT CHANGE UP (ref 3.8–10.5)
WBC # FLD AUTO: 6.88 K/UL — SIGNIFICANT CHANGE UP (ref 3.8–10.5)

## 2021-12-06 PROCEDURE — 75635 CT ANGIO ABDOMINAL ARTERIES: CPT | Mod: 26

## 2021-12-06 RX ORDER — SODIUM,POTASSIUM PHOSPHATES 278-250MG
1 POWDER IN PACKET (EA) ORAL
Refills: 0 | Status: COMPLETED | OUTPATIENT
Start: 2021-12-06 | End: 2021-12-07

## 2021-12-06 RX ADMIN — Medication 3 MILLILITER(S): at 00:09

## 2021-12-06 RX ADMIN — Medication 100 MILLIGRAM(S): at 12:05

## 2021-12-06 RX ADMIN — Medication 20 MILLIEQUIVALENT(S): at 12:05

## 2021-12-06 RX ADMIN — Medication 10 MILLILITER(S): at 00:12

## 2021-12-06 RX ADMIN — Medication 100 MILLIGRAM(S): at 21:18

## 2021-12-06 RX ADMIN — Medication 20 MILLIGRAM(S): at 05:08

## 2021-12-06 RX ADMIN — Medication 100 MILLIGRAM(S): at 05:07

## 2021-12-06 RX ADMIN — Medication 3 MILLILITER(S): at 12:06

## 2021-12-06 RX ADMIN — TRAMADOL HYDROCHLORIDE 25 MILLIGRAM(S): 50 TABLET ORAL at 23:20

## 2021-12-06 RX ADMIN — ENOXAPARIN SODIUM 40 MILLIGRAM(S): 100 INJECTION SUBCUTANEOUS at 12:06

## 2021-12-06 RX ADMIN — Medication 1 PACKET(S): at 17:42

## 2021-12-06 RX ADMIN — Medication 10 MILLILITER(S): at 22:24

## 2021-12-06 RX ADMIN — Medication 50 MILLIGRAM(S): at 05:07

## 2021-12-06 RX ADMIN — Medication 10 MILLILITER(S): at 17:43

## 2021-12-06 RX ADMIN — Medication 81 MILLIGRAM(S): at 12:05

## 2021-12-06 RX ADMIN — Medication 50 MILLIGRAM(S): at 17:43

## 2021-12-06 RX ADMIN — ATORVASTATIN CALCIUM 40 MILLIGRAM(S): 80 TABLET, FILM COATED ORAL at 21:18

## 2021-12-06 RX ADMIN — TRAMADOL HYDROCHLORIDE 25 MILLIGRAM(S): 50 TABLET ORAL at 22:25

## 2021-12-06 NOTE — PROGRESS NOTE ADULT - SUBJECTIVE AND OBJECTIVE BOX
VASCULAR SURGERY DAILY PROGRESS NOTE:       SUBJECTIVE/ROS:  No acute events overnight          MEDICATIONS  (STANDING):  albuterol/ipratropium for Nebulization 3 milliLiter(s) Nebulizer every 6 hours  aspirin  chewable 81 milliGRAM(s) Oral daily  atorvastatin 40 milliGRAM(s) Oral at bedtime  benzonatate 100 milliGRAM(s) Oral every 8 hours  enoxaparin Injectable 40 milliGRAM(s) SubCutaneous daily  furosemide    Tablet 20 milliGRAM(s) Oral daily  metoprolol tartrate 50 milliGRAM(s) Oral two times a day  potassium chloride    Tablet ER 20 milliEquivalent(s) Oral daily    MEDICATIONS  (PRN):  acetaminophen     Tablet .. 650 milliGRAM(s) Oral every 6 hours PRN Mild Pain (1 - 3)  guaifenesin/dextromethorphan Oral Liquid 10 milliLiter(s) Oral three times a day PRN Chronic cough  ondansetron Injectable 4 milliGRAM(s) IV Push every 6 hours PRN Nausea and/or Vomiting  traMADol 25 milliGRAM(s) Oral every 6 hours PRN Moderate Pain (4 - 6)      OBJECTIVE:    Vital Signs Last 24 Hrs  T(C): 37.6 (05 Dec 2021 21:28), Max: 37.6 (05 Dec 2021 21:28)  T(F): 99.7 (05 Dec 2021 21:28), Max: 99.7 (05 Dec 2021 21:28)  HR: 72 (05 Dec 2021 21:28) (66 - 82)  BP: 140/76 (05 Dec 2021 21:28) (117/62 - 178/80)  BP(mean): 97 (05 Dec 2021 11:00) (84 - 120)  RR: 18 (05 Dec 2021 21:28) (15 - 19)  SpO2: 93% (05 Dec 2021 21:28) (90% - 98%)    PHYSICAL EXAM:  Constitutional: awake, alert, oriented x4, no acute distress, responding appropriately  Respiratory: on high flow NC  Abd: soft, nontender, nondistended, right groin dressing w/ minimal strikethrough  Extremities:  Right/Left + DP/PT signals    I&O's Detail    04 Dec 2021 07:01  -  05 Dec 2021 07:00  --------------------------------------------------------  IN:    dextrose 5% + sodium chloride 0.45%: 180 mL  Total IN: 180 mL    OUT:    Indwelling Catheter - Urethral (mL): 80 mL    Voided (mL): 2450 mL  Total OUT: 2530 mL    Total NET: -2350 mL      05 Dec 2021 07:01  -  06 Dec 2021 01:11  --------------------------------------------------------  IN:    Oral Fluid: 600 mL  Total IN: 600 mL    OUT:    Voided (mL): 750 mL  Total OUT: 750 mL    Total NET: -150 mL          Daily     Daily     LABS:                        8.1    6.77  )-----------( 92       ( 05 Dec 2021 03:32 )             25.0     12-05    140  |  105  |  25<H>  ----------------------------<  106<H>  3.8   |  26  |  1.24    Ca    8.0<L>      05 Dec 2021 03:32  Phos  2.4     12-05  Mg     2.1     12-05

## 2021-12-06 NOTE — PROGRESS NOTE ADULT - ASSESSMENT
a/p 77 y/o male w/ a PMHx of HTN, HLD, BPH, nephrolithiasis, varicose veins, s/p bilateral ANGELICA, left iliac artery aneurysm, AAA s/p EVAR presenting s/p repair of type 2B endoleak of the right limb of EVAR stent c/b Perclosure failure requiring a right femoral cutdown w/ primary repair of the right femoral artery & vein, hemorrhagic shock requiring vasopressor support, & acute post-op respiratory insufficiency. transferred to floor on 12/5    - Continue ASA  - Regular diet  - s/p Laxis on 12/5, on 2L NC  - Pain control  - Monitor Right DP/PT signals  - Monitor vital signs  - F/U AM labs        Vascular Surgery  [id number]     a/p 77 y/o male w/ a PMHx of HTN, HLD, BPH, nephrolithiasis, varicose veins, s/p bilateral ANGELICA, left iliac artery aneurysm, AAA s/p EVAR presenting s/p repair of type 2B endoleak of the right limb of EVAR stent c/b Perclosure failure requiring a right femoral cutdown w/ primary repair of the right femoral artery & vein, hemorrhagic shock requiring vasopressor support, & acute post-op respiratory insufficiency. transferred to floor on 12/5    - Continue ASA  - Regular diet  - s/p Laxis on 12/5, on 2L NC  - Pain control  - Monitor Right DP/PT signals  - Monitor vital signs  - F/U AM labs  - Chest PT, Home PT plan        Vascular Surgery  [id number]

## 2021-12-07 ENCOUNTER — TRANSCRIPTION ENCOUNTER (OUTPATIENT)
Age: 76
End: 2021-12-07

## 2021-12-07 LAB
ANION GAP SERPL CALC-SCNC: 11 MMOL/L — SIGNIFICANT CHANGE UP (ref 5–17)
APTT BLD: 24.7 SEC — LOW (ref 27.5–35.5)
APTT BLD: 47 SEC — HIGH (ref 27.5–35.5)
BUN SERPL-MCNC: 17 MG/DL — SIGNIFICANT CHANGE UP (ref 7–23)
CALCIUM SERPL-MCNC: 8.3 MG/DL — LOW (ref 8.4–10.5)
CHLORIDE SERPL-SCNC: 101 MMOL/L — SIGNIFICANT CHANGE UP (ref 96–108)
CO2 SERPL-SCNC: 24 MMOL/L — SIGNIFICANT CHANGE UP (ref 22–31)
CREAT SERPL-MCNC: 1.1 MG/DL — SIGNIFICANT CHANGE UP (ref 0.5–1.3)
GLUCOSE SERPL-MCNC: 94 MG/DL — SIGNIFICANT CHANGE UP (ref 70–99)
HCT VFR BLD CALC: 28.1 % — LOW (ref 39–50)
HCT VFR BLD CALC: 30.3 % — LOW (ref 39–50)
HGB BLD-MCNC: 9 G/DL — LOW (ref 13–17)
HGB BLD-MCNC: 9.9 G/DL — LOW (ref 13–17)
INR BLD: 1.11 RATIO — SIGNIFICANT CHANGE UP (ref 0.88–1.16)
MAGNESIUM SERPL-MCNC: 2 MG/DL — SIGNIFICANT CHANGE UP (ref 1.6–2.6)
MCHC RBC-ENTMCNC: 30.1 PG — SIGNIFICANT CHANGE UP (ref 27–34)
MCHC RBC-ENTMCNC: 30.4 PG — SIGNIFICANT CHANGE UP (ref 27–34)
MCHC RBC-ENTMCNC: 32 GM/DL — SIGNIFICANT CHANGE UP (ref 32–36)
MCHC RBC-ENTMCNC: 32.7 GM/DL — SIGNIFICANT CHANGE UP (ref 32–36)
MCV RBC AUTO: 92.9 FL — SIGNIFICANT CHANGE UP (ref 80–100)
MCV RBC AUTO: 94 FL — SIGNIFICANT CHANGE UP (ref 80–100)
NRBC # BLD: 0 /100 WBCS — SIGNIFICANT CHANGE UP (ref 0–0)
NRBC # BLD: 0 /100 WBCS — SIGNIFICANT CHANGE UP (ref 0–0)
PHOSPHATE SERPL-MCNC: 2.4 MG/DL — LOW (ref 2.5–4.5)
PLATELET # BLD AUTO: 128 K/UL — LOW (ref 150–400)
PLATELET # BLD AUTO: 150 K/UL — SIGNIFICANT CHANGE UP (ref 150–400)
POTASSIUM SERPL-MCNC: 3.8 MMOL/L — SIGNIFICANT CHANGE UP (ref 3.5–5.3)
POTASSIUM SERPL-SCNC: 3.8 MMOL/L — SIGNIFICANT CHANGE UP (ref 3.5–5.3)
PROTHROM AB SERPL-ACNC: 13.3 SEC — SIGNIFICANT CHANGE UP (ref 10.6–13.6)
RBC # BLD: 2.99 M/UL — LOW (ref 4.2–5.8)
RBC # BLD: 3.26 M/UL — LOW (ref 4.2–5.8)
RBC # FLD: 13.3 % — SIGNIFICANT CHANGE UP (ref 10.3–14.5)
RBC # FLD: 13.5 % — SIGNIFICANT CHANGE UP (ref 10.3–14.5)
SARS-COV-2 RNA SPEC QL NAA+PROBE: SIGNIFICANT CHANGE UP
SODIUM SERPL-SCNC: 136 MMOL/L — SIGNIFICANT CHANGE UP (ref 135–145)
WBC # BLD: 7.6 K/UL — SIGNIFICANT CHANGE UP (ref 3.8–10.5)
WBC # BLD: 9.7 K/UL — SIGNIFICANT CHANGE UP (ref 3.8–10.5)
WBC # FLD AUTO: 7.6 K/UL — SIGNIFICANT CHANGE UP (ref 3.8–10.5)
WBC # FLD AUTO: 9.7 K/UL — SIGNIFICANT CHANGE UP (ref 3.8–10.5)

## 2021-12-07 RX ORDER — HEPARIN SODIUM 5000 [USP'U]/ML
1900 INJECTION INTRAVENOUS; SUBCUTANEOUS
Qty: 25000 | Refills: 0 | Status: DISCONTINUED | OUTPATIENT
Start: 2021-12-07 | End: 2021-12-08

## 2021-12-07 RX ORDER — HEPARIN SODIUM 5000 [USP'U]/ML
1700 INJECTION INTRAVENOUS; SUBCUTANEOUS
Qty: 25000 | Refills: 0 | Status: DISCONTINUED | OUTPATIENT
Start: 2021-12-07 | End: 2021-12-07

## 2021-12-07 RX ORDER — SODIUM CHLORIDE 9 MG/ML
1000 INJECTION, SOLUTION INTRAVENOUS
Refills: 0 | Status: COMPLETED | OUTPATIENT
Start: 2021-12-08 | End: 2022-11-06

## 2021-12-07 RX ORDER — SODIUM,POTASSIUM PHOSPHATES 278-250MG
1 POWDER IN PACKET (EA) ORAL ONCE
Refills: 0 | Status: COMPLETED | OUTPATIENT
Start: 2021-12-07 | End: 2021-12-07

## 2021-12-07 RX ORDER — LANOLIN ALCOHOL/MO/W.PET/CERES
3 CREAM (GRAM) TOPICAL AT BEDTIME
Refills: 0 | Status: DISCONTINUED | OUTPATIENT
Start: 2021-12-07 | End: 2021-12-08

## 2021-12-07 RX ADMIN — Medication 1 PACKET(S): at 11:40

## 2021-12-07 RX ADMIN — ATORVASTATIN CALCIUM 40 MILLIGRAM(S): 80 TABLET, FILM COATED ORAL at 21:58

## 2021-12-07 RX ADMIN — HEPARIN SODIUM 19 UNIT(S)/HR: 5000 INJECTION INTRAVENOUS; SUBCUTANEOUS at 20:01

## 2021-12-07 RX ADMIN — Medication 3 MILLILITER(S): at 17:34

## 2021-12-07 RX ADMIN — Medication 81 MILLIGRAM(S): at 11:40

## 2021-12-07 RX ADMIN — Medication 1 PACKET(S): at 05:33

## 2021-12-07 RX ADMIN — Medication 20 MILLIEQUIVALENT(S): at 11:40

## 2021-12-07 RX ADMIN — Medication 20 MILLIGRAM(S): at 05:33

## 2021-12-07 RX ADMIN — Medication 100 MILLIGRAM(S): at 11:43

## 2021-12-07 RX ADMIN — Medication 50 MILLIGRAM(S): at 05:33

## 2021-12-07 RX ADMIN — HEPARIN SODIUM 17 UNIT(S)/HR: 5000 INJECTION INTRAVENOUS; SUBCUTANEOUS at 11:41

## 2021-12-07 RX ADMIN — ONDANSETRON 4 MILLIGRAM(S): 8 TABLET, FILM COATED ORAL at 16:49

## 2021-12-07 RX ADMIN — Medication 3 MILLILITER(S): at 06:55

## 2021-12-07 RX ADMIN — Medication 50 MILLIGRAM(S): at 17:34

## 2021-12-07 RX ADMIN — Medication 100 MILLIGRAM(S): at 05:33

## 2021-12-07 RX ADMIN — Medication 10 MILLILITER(S): at 22:41

## 2021-12-07 RX ADMIN — HEPARIN SODIUM 19 UNIT(S)/HR: 5000 INJECTION INTRAVENOUS; SUBCUTANEOUS at 23:40

## 2021-12-07 RX ADMIN — TRAMADOL HYDROCHLORIDE 25 MILLIGRAM(S): 50 TABLET ORAL at 05:33

## 2021-12-07 RX ADMIN — Medication 100 MILLIGRAM(S): at 21:58

## 2021-12-07 RX ADMIN — Medication 10 MILLILITER(S): at 05:33

## 2021-12-07 RX ADMIN — Medication 3 MILLILITER(S): at 11:42

## 2021-12-07 RX ADMIN — Medication 3 MILLIGRAM(S): at 21:58

## 2021-12-07 NOTE — PRE-ANESTHESIA EVALUATION ADULT - LAST CARDIAC ANGIOGRAM/IMAGING
had a cardiac catheterization at [place] a week ago due to dyspnea on exertion, abnormal stress test, no intervention
had a cardiac catheterization at [place] a week ago due to dyspnea on exertion, abnormal stress test, no intervention

## 2021-12-07 NOTE — PRE-ANESTHESIA EVALUATION ADULT - NSANTHOSAYNRD_GEN_A_CORE
No. ARASH screening performed.  STOP BANG Legend: 0-2 = LOW Risk; 3-4 = INTERMEDIATE Risk; 5-8 = HIGH Risk
No. ARASH screening performed.  STOP BANG Legend: 0-2 = LOW Risk; 3-4 = INTERMEDIATE Risk; 5-8 = HIGH Risk

## 2021-12-07 NOTE — PRE-ANESTHESIA EVALUATION ADULT - NSANTHPMHFT_GEN_ALL_CORE
Patient has a pmh htn, hld, BPH, nonobstructive CAD, AAA s/p evar, s/p repair of type 2 endoleak, which was c/b hemorrhagic shock s/p 6 units pRBC, sent to unit on pressors. Now recovered, currently in the floor. No oxygen or pressor requirements.   Patient denies any active chest pain or SOB.

## 2021-12-07 NOTE — PROGRESS NOTE ADULT - SUBJECTIVE AND OBJECTIVE BOX
VASCULAR SURGERY DAILY PROGRESS NOTE:     24h events: CTA performed    SUBJECTIVE/ROS: Patient feels well. Denies any complaints.   Denies nausea, vomiting, chest pain, shortness of breath     MEDICATIONS  (STANDING):  albuterol/ipratropium for Nebulization 3 milliLiter(s) Nebulizer every 6 hours  aspirin  chewable 81 milliGRAM(s) Oral daily  atorvastatin 40 milliGRAM(s) Oral at bedtime  benzonatate 100 milliGRAM(s) Oral every 8 hours  enoxaparin Injectable 40 milliGRAM(s) SubCutaneous daily  metoprolol tartrate 50 milliGRAM(s) Oral two times a day  potassium chloride    Tablet ER 20 milliEquivalent(s) Oral daily    MEDICATIONS  (PRN):  acetaminophen     Tablet .. 650 milliGRAM(s) Oral every 6 hours PRN Mild Pain (1 - 3)  guaifenesin/dextromethorphan Oral Liquid 10 milliLiter(s) Oral three times a day PRN Chronic cough  ondansetron Injectable 4 milliGRAM(s) IV Push every 6 hours PRN Nausea and/or Vomiting  traMADol 25 milliGRAM(s) Oral every 6 hours PRN Moderate Pain (4 - 6)    OBJECTIVE:  Vital Signs Last 24 Hrs  T(C): 36.9 (07 Dec 2021 05:33), Max: 37.2 (06 Dec 2021 17:58)  T(F): 98.5 (07 Dec 2021 05:33), Max: 98.9 (06 Dec 2021 17:58)  HR: 65 (07 Dec 2021 05:33) (65 - 78)  BP: 155/91 (07 Dec 2021 05:33) (143/82 - 170/90)  BP(mean): --  RR: 18 (07 Dec 2021 05:33) (18 - 18)  SpO2: 95% (07 Dec 2021 05:33) (94% - 97%)    I&O's Detail  06 Dec 2021 07:01  -  07 Dec 2021 07:00  --------------------------------------------------------  IN:    Oral Fluid: 900 mL  Total IN: 900 mL    OUT:    Voided (mL): 2725 mL  Total OUT: 2725 mL  Total NET: -1825 mL      Daily     Daily     LABS:                        9.0    7.60  )-----------( 128      ( 07 Dec 2021 06:49 )             28.1     12-06    137  |  102  |  20  ----------------------------<  123<H>  3.6   |  24  |  1.11    Ca    8.1<L>      06 Dec 2021 06:55  Phos  2.1     12-06  Mg     2.1     12-06        PHYSICAL EXAM:  Constitutional: awake, alert, oriented x4, no acute distress, responding appropriately  Respiratory: on 2L N/C  Abd: soft, nontender, nondistended, right groin dressing w/ minimal strikethrough, staples present right groin.  Extremities:  Right/Left + DP/PT signals

## 2021-12-07 NOTE — PROGRESS NOTE ADULT - ASSESSMENT
77 y/o male w/ a PMHx of HTN, HLD, BPH, nephrolithiasis, varicose veins, s/p bilateral ANGELICA, left iliac artery aneurysm, AAA s/p EVAR presenting s/p repair of type 2B endoleak of the right limb of EVAR stent c/b Perclosure failure requiring a right femoral cutdown w/ primary repair of the right femoral artery & vein, hemorrhagic shock requiring vasopressor support, & acute post-op respiratory insufficiency. transferred to floor on 12/5. Recovering well on the floor.    - Continue ASA  - Regular diet  - s/p Laxis on 12/5, on 2L NC- now being weaned off   - Pain control  - Monitor Right DP/PT signals  - Monitor vital signs  - F/U AM labs  - Chest PT, Home PT plan      Vascular Surgery  [id number]   77 y/o male w/ a PMHx of HTN, HLD, BPH, nephrolithiasis, varicose veins, s/p bilateral ANGELICA, left iliac artery aneurysm, AAA s/p EVAR presenting s/p repair of type 2B endoleak of the right limb of EVAR stent c/b Perclosure failure requiring a right femoral cutdown w/ primary repair of the right femoral artery & vein, hemorrhagic shock requiring vasopressor support, & acute post-op respiratory insufficiency. transferred to floor on 12/5. Recovering well on the floor.    - CTA reviewed: Heparin gtt started today. Plan for OR tomorrow  - Continue ASA  - Regular diet  - s/p Laxis on 12/5, on 2L NC- now being weaned off   - Pain control  - Monitor Right DP/PT signals  - Monitor vital signs  - F/U AM labs  - Chest PT, Home PT plan      Vascular Surgery  [id number]

## 2021-12-08 LAB
ANION GAP SERPL CALC-SCNC: 11 MMOL/L — SIGNIFICANT CHANGE UP (ref 5–17)
ANION GAP SERPL CALC-SCNC: 13 MMOL/L — SIGNIFICANT CHANGE UP (ref 5–17)
APTT BLD: 53.5 SEC — HIGH (ref 27.5–35.5)
APTT BLD: 57.3 SEC — HIGH (ref 27.5–35.5)
BLD GP AB SCN SERPL QL: NEGATIVE — SIGNIFICANT CHANGE UP
BUN SERPL-MCNC: 18 MG/DL — SIGNIFICANT CHANGE UP (ref 7–23)
BUN SERPL-MCNC: 20 MG/DL — SIGNIFICANT CHANGE UP (ref 7–23)
CALCIUM SERPL-MCNC: 8.3 MG/DL — LOW (ref 8.4–10.5)
CALCIUM SERPL-MCNC: 8.8 MG/DL — SIGNIFICANT CHANGE UP (ref 8.4–10.5)
CHLORIDE SERPL-SCNC: 101 MMOL/L — SIGNIFICANT CHANGE UP (ref 96–108)
CHLORIDE SERPL-SCNC: 99 MMOL/L — SIGNIFICANT CHANGE UP (ref 96–108)
CO2 SERPL-SCNC: 23 MMOL/L — SIGNIFICANT CHANGE UP (ref 22–31)
CO2 SERPL-SCNC: 24 MMOL/L — SIGNIFICANT CHANGE UP (ref 22–31)
CREAT SERPL-MCNC: 1.14 MG/DL — SIGNIFICANT CHANGE UP (ref 0.5–1.3)
CREAT SERPL-MCNC: 1.25 MG/DL — SIGNIFICANT CHANGE UP (ref 0.5–1.3)
GAS PNL BLDA: SIGNIFICANT CHANGE UP
GLUCOSE SERPL-MCNC: 112 MG/DL — HIGH (ref 70–99)
GLUCOSE SERPL-MCNC: 142 MG/DL — HIGH (ref 70–99)
HCT VFR BLD CALC: 27.4 % — LOW (ref 39–50)
HCT VFR BLD CALC: 32.9 % — LOW (ref 39–50)
HGB BLD-MCNC: 11 G/DL — LOW (ref 13–17)
HGB BLD-MCNC: 9.2 G/DL — LOW (ref 13–17)
INR BLD: 1.27 RATIO — HIGH (ref 0.88–1.16)
MAGNESIUM SERPL-MCNC: 1.9 MG/DL — SIGNIFICANT CHANGE UP (ref 1.6–2.6)
MCHC RBC-ENTMCNC: 29.6 PG — SIGNIFICANT CHANGE UP (ref 27–34)
MCHC RBC-ENTMCNC: 30.7 PG — SIGNIFICANT CHANGE UP (ref 27–34)
MCHC RBC-ENTMCNC: 33.4 GM/DL — SIGNIFICANT CHANGE UP (ref 32–36)
MCHC RBC-ENTMCNC: 33.6 GM/DL — SIGNIFICANT CHANGE UP (ref 32–36)
MCV RBC AUTO: 88.4 FL — SIGNIFICANT CHANGE UP (ref 80–100)
MCV RBC AUTO: 91.3 FL — SIGNIFICANT CHANGE UP (ref 80–100)
NRBC # BLD: 0 /100 WBCS — SIGNIFICANT CHANGE UP (ref 0–0)
NRBC # BLD: 0 /100 WBCS — SIGNIFICANT CHANGE UP (ref 0–0)
PHOSPHATE SERPL-MCNC: 3.1 MG/DL — SIGNIFICANT CHANGE UP (ref 2.5–4.5)
PLATELET # BLD AUTO: 147 K/UL — LOW (ref 150–400)
PLATELET # BLD AUTO: 148 K/UL — LOW (ref 150–400)
POTASSIUM SERPL-MCNC: 4.1 MMOL/L — SIGNIFICANT CHANGE UP (ref 3.5–5.3)
POTASSIUM SERPL-MCNC: 4.3 MMOL/L — SIGNIFICANT CHANGE UP (ref 3.5–5.3)
POTASSIUM SERPL-SCNC: 4.1 MMOL/L — SIGNIFICANT CHANGE UP (ref 3.5–5.3)
POTASSIUM SERPL-SCNC: 4.3 MMOL/L — SIGNIFICANT CHANGE UP (ref 3.5–5.3)
PROTHROM AB SERPL-ACNC: 15.1 SEC — HIGH (ref 10.6–13.6)
RBC # BLD: 3 M/UL — LOW (ref 4.2–5.8)
RBC # BLD: 3.72 M/UL — LOW (ref 4.2–5.8)
RBC # FLD: 13.5 % — SIGNIFICANT CHANGE UP (ref 10.3–14.5)
RBC # FLD: 14.6 % — HIGH (ref 10.3–14.5)
RH IG SCN BLD-IMP: POSITIVE — SIGNIFICANT CHANGE UP
SODIUM SERPL-SCNC: 134 MMOL/L — LOW (ref 135–145)
SODIUM SERPL-SCNC: 137 MMOL/L — SIGNIFICANT CHANGE UP (ref 135–145)
WBC # BLD: 10.11 K/UL — SIGNIFICANT CHANGE UP (ref 3.8–10.5)
WBC # BLD: 9.82 K/UL — SIGNIFICANT CHANGE UP (ref 3.8–10.5)
WBC # FLD AUTO: 10.11 K/UL — SIGNIFICANT CHANGE UP (ref 3.8–10.5)
WBC # FLD AUTO: 9.82 K/UL — SIGNIFICANT CHANGE UP (ref 3.8–10.5)

## 2021-12-08 RX ORDER — CEFAZOLIN SODIUM 1 G
2000 VIAL (EA) INJECTION ONCE
Refills: 0 | Status: COMPLETED | OUTPATIENT
Start: 2021-12-08 | End: 2021-12-08

## 2021-12-08 RX ORDER — IPRATROPIUM/ALBUTEROL SULFATE 18-103MCG
3 AEROSOL WITH ADAPTER (GRAM) INHALATION EVERY 6 HOURS
Refills: 0 | Status: DISCONTINUED | OUTPATIENT
Start: 2021-12-08 | End: 2021-12-11

## 2021-12-08 RX ORDER — SODIUM CHLORIDE 9 MG/ML
1000 INJECTION, SOLUTION INTRAVENOUS
Refills: 0 | Status: DISCONTINUED | OUTPATIENT
Start: 2021-12-08 | End: 2021-12-08

## 2021-12-08 RX ORDER — HEPARIN SODIUM 5000 [USP'U]/ML
5000 INJECTION INTRAVENOUS; SUBCUTANEOUS EVERY 8 HOURS
Refills: 0 | Status: DISCONTINUED | OUTPATIENT
Start: 2021-12-08 | End: 2021-12-11

## 2021-12-08 RX ORDER — HEPARIN SODIUM 5000 [USP'U]/ML
2100 INJECTION INTRAVENOUS; SUBCUTANEOUS
Qty: 25000 | Refills: 0 | Status: DISCONTINUED | OUTPATIENT
Start: 2021-12-08 | End: 2021-12-08

## 2021-12-08 RX ORDER — METOPROLOL TARTRATE 50 MG
50 TABLET ORAL
Refills: 0 | Status: DISCONTINUED | OUTPATIENT
Start: 2021-12-08 | End: 2021-12-11

## 2021-12-08 RX ORDER — LABETALOL HCL 100 MG
20 TABLET ORAL ONCE
Refills: 0 | Status: COMPLETED | OUTPATIENT
Start: 2021-12-08 | End: 2021-12-08

## 2021-12-08 RX ORDER — ONDANSETRON 8 MG/1
4 TABLET, FILM COATED ORAL EVERY 6 HOURS
Refills: 0 | Status: DISCONTINUED | OUTPATIENT
Start: 2021-12-08 | End: 2021-12-11

## 2021-12-08 RX ORDER — ACETAMINOPHEN 500 MG
1000 TABLET ORAL ONCE
Refills: 0 | Status: DISCONTINUED | OUTPATIENT
Start: 2021-12-08 | End: 2021-12-09

## 2021-12-08 RX ORDER — ASPIRIN/CALCIUM CARB/MAGNESIUM 324 MG
81 TABLET ORAL DAILY
Refills: 0 | Status: DISCONTINUED | OUTPATIENT
Start: 2021-12-08 | End: 2021-12-11

## 2021-12-08 RX ORDER — ACETAMINOPHEN 500 MG
650 TABLET ORAL EVERY 6 HOURS
Refills: 0 | Status: DISCONTINUED | OUTPATIENT
Start: 2021-12-08 | End: 2021-12-09

## 2021-12-08 RX ORDER — TOBRAMYCIN 0.3 %
1 DROPS OPHTHALMIC (EYE) EVERY 4 HOURS
Refills: 0 | Status: COMPLETED | OUTPATIENT
Start: 2021-12-08 | End: 2021-12-09

## 2021-12-08 RX ORDER — LANOLIN ALCOHOL/MO/W.PET/CERES
3 CREAM (GRAM) TOPICAL AT BEDTIME
Refills: 0 | Status: DISCONTINUED | OUTPATIENT
Start: 2021-12-08 | End: 2021-12-11

## 2021-12-08 RX ORDER — ATORVASTATIN CALCIUM 80 MG/1
40 TABLET, FILM COATED ORAL AT BEDTIME
Refills: 0 | Status: DISCONTINUED | OUTPATIENT
Start: 2021-12-08 | End: 2021-12-11

## 2021-12-08 RX ORDER — TRAMADOL HYDROCHLORIDE 50 MG/1
25 TABLET ORAL EVERY 6 HOURS
Refills: 0 | Status: DISCONTINUED | OUTPATIENT
Start: 2021-12-08 | End: 2021-12-11

## 2021-12-08 RX ORDER — FENTANYL CITRATE 50 UG/ML
25 INJECTION INTRAVENOUS
Refills: 0 | Status: DISCONTINUED | OUTPATIENT
Start: 2021-12-08 | End: 2021-12-09

## 2021-12-08 RX ORDER — CEFAZOLIN SODIUM 1 G
2000 VIAL (EA) INJECTION EVERY 8 HOURS
Refills: 0 | Status: DISCONTINUED | OUTPATIENT
Start: 2021-12-09 | End: 2021-12-11

## 2021-12-08 RX ORDER — GUAIFENESIN/DEXTROMETHORPHAN 600MG-30MG
10 TABLET, EXTENDED RELEASE 12 HR ORAL THREE TIMES A DAY
Refills: 0 | Status: DISCONTINUED | OUTPATIENT
Start: 2021-12-08 | End: 2021-12-11

## 2021-12-08 RX ORDER — ONDANSETRON 8 MG/1
4 TABLET, FILM COATED ORAL ONCE
Refills: 0 | Status: DISCONTINUED | OUTPATIENT
Start: 2021-12-08 | End: 2021-12-09

## 2021-12-08 RX ORDER — CEFAZOLIN SODIUM 1 G
VIAL (EA) INJECTION
Refills: 0 | Status: DISCONTINUED | OUTPATIENT
Start: 2021-12-08 | End: 2021-12-11

## 2021-12-08 RX ORDER — LABETALOL HCL 100 MG
10 TABLET ORAL ONCE
Refills: 0 | Status: COMPLETED | OUTPATIENT
Start: 2021-12-08 | End: 2021-12-08

## 2021-12-08 RX ORDER — FENTANYL CITRATE 50 UG/ML
50 INJECTION INTRAVENOUS ONCE
Refills: 0 | Status: DISCONTINUED | OUTPATIENT
Start: 2021-12-08 | End: 2021-12-09

## 2021-12-08 RX ORDER — HYDRALAZINE HCL 50 MG
10 TABLET ORAL ONCE
Refills: 0 | Status: COMPLETED | OUTPATIENT
Start: 2021-12-08 | End: 2021-12-08

## 2021-12-08 RX ADMIN — Medication 20 MILLIGRAM(S): at 19:22

## 2021-12-08 RX ADMIN — Medication 1 DROP(S): at 23:30

## 2021-12-08 RX ADMIN — Medication 100 MILLIGRAM(S): at 05:31

## 2021-12-08 RX ADMIN — Medication 50 MILLIGRAM(S): at 05:30

## 2021-12-08 RX ADMIN — Medication 3 MILLIGRAM(S): at 21:11

## 2021-12-08 RX ADMIN — HEPARIN SODIUM 21 UNIT(S)/HR: 5000 INJECTION INTRAVENOUS; SUBCUTANEOUS at 03:10

## 2021-12-08 RX ADMIN — Medication 1 DROP(S): at 20:07

## 2021-12-08 RX ADMIN — Medication 10 MILLIGRAM(S): at 19:05

## 2021-12-08 RX ADMIN — Medication 81 MILLIGRAM(S): at 11:22

## 2021-12-08 RX ADMIN — ATORVASTATIN CALCIUM 40 MILLIGRAM(S): 80 TABLET, FILM COATED ORAL at 21:11

## 2021-12-08 RX ADMIN — Medication 100 MILLIGRAM(S): at 21:12

## 2021-12-08 RX ADMIN — HEPARIN SODIUM 22 UNIT(S)/HR: 5000 INJECTION INTRAVENOUS; SUBCUTANEOUS at 11:21

## 2021-12-08 RX ADMIN — HEPARIN SODIUM 5000 UNIT(S): 5000 INJECTION INTRAVENOUS; SUBCUTANEOUS at 21:10

## 2021-12-08 RX ADMIN — Medication 3 MILLILITER(S): at 11:46

## 2021-12-08 RX ADMIN — Medication 100 MILLIGRAM(S): at 21:45

## 2021-12-08 RX ADMIN — Medication 50 MILLIGRAM(S): at 20:30

## 2021-12-08 RX ADMIN — Medication 1 DROP(S): at 20:01

## 2021-12-08 NOTE — BRIEF OPERATIVE NOTE - NSICDXBRIEFPOSTOP_GEN_ALL_CORE_FT
POST-OP DIAGNOSIS:  Type IIb endoleak of aortic graft 01-Dec-2021 19:46:12  Margarita Soto  
POST-OP DIAGNOSIS:  Type IIb endoleak of aortic graft 01-Dec-2021 19:46:12  Margarita Soto

## 2021-12-08 NOTE — PROGRESS NOTE ADULT - ASSESSMENT
77 y/o male w/ a PMHx of HTN, HLD, BPH, nephrolithiasis, varicose veins, s/p bilateral ANGELICA, left iliac artery aneurysm, AAA s/p EVAR presenting s/p repair of type 2B endoleak of the right limb of EVAR stent c/b Perclosure failure requiring a right femoral cutdown w/ primary repair of the right femoral artery & vein, hemorrhagic shock requiring vasopressor support, & acute post-op respiratory insufficiency. transferred to floor on 12/5. Patient now POD 1 from [facility] thrombectomy and stent placement x 2 ([facility])      - Continue ASA  - Regular diet  - s/p Lasix on 12/5, on 2L NC- now being weaned off   - Pain control  - Monitor Right DP/PT signals  - Monitor vital signs  - F/U AM labs  - Chest PT, Home PT plan      Vascular Surgery  [id number]

## 2021-12-08 NOTE — PROGRESS NOTE ADULT - SUBJECTIVE AND OBJECTIVE BOX
VASCULAR SURGERY DAILY PROGRESS NOTE:     24h events: CTA performed    SUBJECTIVE/ROS: Patient feels well. Denies any complaints.   Denies nausea, vomiting, chest pain, shortness of breath. Aware of planned operation today.     MEDICATIONS  (STANDING):  albuterol/ipratropium for Nebulization 3 milliLiter(s) Nebulizer every 6 hours  aspirin  chewable 81 milliGRAM(s) Oral daily  atorvastatin 40 milliGRAM(s) Oral at bedtime  benzonatate 100 milliGRAM(s) Oral every 8 hours  enoxaparin Injectable 40 milliGRAM(s) SubCutaneous daily  metoprolol tartrate 50 milliGRAM(s) Oral two times a day  potassium chloride    Tablet ER 20 milliEquivalent(s) Oral daily    MEDICATIONS  (PRN):  acetaminophen     Tablet .. 650 milliGRAM(s) Oral every 6 hours PRN Mild Pain (1 - 3)  guaifenesin/dextromethorphan Oral Liquid 10 milliLiter(s) Oral three times a day PRN Chronic cough  ondansetron Injectable 4 milliGRAM(s) IV Push every 6 hours PRN Nausea and/or Vomiting  traMADol 25 milliGRAM(s) Oral every 6 hours PRN Moderate Pain (4 - 6)    OBJECTIVE:  Vital Signs Last 24 Hrs  T(C): 36.7 (08 Dec 2021 05:12), Max: 37.2 (07 Dec 2021 21:58)  T(F): 98 (08 Dec 2021 05:12), Max: 98.9 (07 Dec 2021 21:58)  HR: 74 (08 Dec 2021 05:12) (69 - 76)  BP: 161/85 (08 Dec 2021 05:12) (144/88 - 161/85)  BP(mean): --  RR: 18 (08 Dec 2021 05:12) (18 - 18)  SpO2: 96% (08 Dec 2021 05:12) (92% - 97%)      LABS:                        9.2    10.11 )-----------( 148      ( 08 Dec 2021 01:58 )             27.4     12-08    134<L>  |  99  |  20  ----------------------------<  112<H>  4.1   |  24  |  1.25    Ca    8.3<L>      08 Dec 2021 01:58  Phos  3.1     12-08  Mg     1.9     12-08      PT/INR - ( 08 Dec 2021 01:58 )   PT: 15.1 sec;   INR: 1.27 ratio         PTT - ( 08 Dec 2021 01:58 )  PTT:53.5 sec      PHYSICAL EXAM:  Constitutional: awake, alert, oriented x4, no acute distress, responding appropriately  Respiratory: on 2L N/C  Abd: soft, nontender, nondistended, right groin dressing w/ minimal strikethrough, staples present right groin.  Extremities:  Right + DP/PT signals

## 2021-12-08 NOTE — BRIEF OPERATIVE NOTE - NSICDXBRIEFPREOP_GEN_ALL_CORE_FT
PRE-OP DIAGNOSIS:  Type IIb endoleak of aortic graft 01-Dec-2021 19:45:32  Margarita Soto  
PRE-OP DIAGNOSIS:  Type IIb endoleak of aortic graft 01-Dec-2021 19:45:32  Margarita Soto

## 2021-12-08 NOTE — BRIEF OPERATIVE NOTE - NSICDXBRIEFPROCEDURE_GEN_ALL_CORE_FT
PROCEDURES:  Repair, endoleak, AAA 01-Dec-2021 19:45:20  Margarita Soot  Insertion of iliac stent 08-Dec-2021 18:43:36  Margarita Soto  
PROCEDURES:  Repair, endoleak, AAA 01-Dec-2021 19:45:20  Margarita Soto

## 2021-12-08 NOTE — PROGRESS NOTE ADULT - SUBJECTIVE AND OBJECTIVE BOX
SURGERY PROGRESS NOTE      SUBJECTIVE/ROS:   No acute events overnight  Patient feels generally well  Denies abdominal pain, nausea, vomiting  Endorses flatus. Endorses bowel movement         OBJECTIVE:    Vital Signs Last 24 Hrs  T(C): 36.7 (08 Dec 2021 23:30), Max: 36.9 (08 Dec 2021 01:18)  T(F): 98.1 (08 Dec 2021 23:30), Max: 98.4 (08 Dec 2021 01:18)  HR: 69 (08 Dec 2021 23:30) (64 - 83)  BP: 119/65 (08 Dec 2021 23:30) (119/65 - 176/97)  BP(mean): 92 (08 Dec 2021 22:00) (92 - 130)  RR: 16 (08 Dec 2021 23:30) (14 - 20)  SpO2: 96% (08 Dec 2021 23:30) (90% - 99%)    PHYSICAL EXAM:  Constitutional:  NAD  Neuro: AOx3  Respiratory: Non-labored breathing  Gastrointestinal: Abdomen soft, nontender, nondistended.   Extremities:  2+ Peripheral Pulses, No edema,  FROM, warm      I&Os:  I&O's Detail    07 Dec 2021 07:01  -  08 Dec 2021 07:00  --------------------------------------------------------  IN:    IV PiggyBack: 102 mL    Oral Fluid: 240 mL  Total IN: 342 mL    OUT:    Emesis (mL): 0 mL    Voided (mL): 2075 mL  Total OUT: 2075 mL    Total NET: -1733 mL      08 Dec 2021 07:01  -  08 Dec 2021 23:54  --------------------------------------------------------  IN:    dextrose 5% + sodium chloride 0.45%: 450 mL    Heparin: 129 mL    IV PiggyBack: 50 mL  Total IN: 629 mL    OUT:    Indwelling Catheter - Urethral (mL): 1950 mL    Oral Fluid: 0 mL    Voided (mL): 500 mL  Total OUT: 2450 mL    Total NET: -1821 mL          LABS:                        11.0   9.82  )-----------( 147      ( 08 Dec 2021 18:48 )             32.9     12-08    137  |  101  |  18  ----------------------------<  142<H>  4.3   |  23  |  1.14    Ca    8.8      08 Dec 2021 18:48  Phos  3.1     12-08  Mg     1.9     12-08      PT/INR - ( 08 Dec 2021 01:58 )   PT: 15.1 sec;   INR: 1.27 ratio         PTT - ( 08 Dec 2021 09:47 )  PTT:57.3 sec     SURGERY PROGRESS NOTE      SUBJECTIVE/ROS:   Patient seen and examined in PACU after 6 hours of laying flat  Patient states everything is good.  Denies CP, SOB.  Patient previously hypertensive immediately post-op, resolved with labetalol.  No other acute events overnight.    OBJECTIVE:    Vital Signs Last 24 Hrs  T(C): 36.7 (08 Dec 2021 23:30), Max: 36.9 (08 Dec 2021 01:18)  T(F): 98.1 (08 Dec 2021 23:30), Max: 98.4 (08 Dec 2021 01:18)  HR: 69 (08 Dec 2021 23:30) (64 - 83)  BP: 119/65 (08 Dec 2021 23:30) (119/65 - 176/97)  BP(mean): 92 (08 Dec 2021 22:00) (92 - 130)  RR: 16 (08 Dec 2021 23:30) (14 - 20)  SpO2: 96% (08 Dec 2021 23:30) (90% - 99%)    PHYSICAL EXAM:  Constitutional:  NAD  Neuro: AOx3  Respiratory: Non-labored breathing  Gastrointestinal: Abdomen soft, nontender, nondistended.   : Right groin dressing c/d/i, no hematoma  Extremities:  b/l DP pulses      I&Os:  I&O's Detail    07 Dec 2021 07:01  -  08 Dec 2021 07:00  --------------------------------------------------------  IN:    IV PiggyBack: 102 mL    Oral Fluid: 240 mL  Total IN: 342 mL    OUT:    Emesis (mL): 0 mL    Voided (mL): 2075 mL  Total OUT: 2075 mL    Total NET: -1733 mL      08 Dec 2021 07:01  -  08 Dec 2021 23:54  --------------------------------------------------------  IN:    dextrose 5% + sodium chloride 0.45%: 450 mL    Heparin: 129 mL    IV PiggyBack: 50 mL  Total IN: 629 mL    OUT:    Indwelling Catheter - Urethral (mL): 1950 mL    Oral Fluid: 0 mL    Voided (mL): 500 mL  Total OUT: 2450 mL    Total NET: -1821 mL          LABS:                        11.0   9.82  )-----------( 147      ( 08 Dec 2021 18:48 )             32.9     12-08    137  |  101  |  18  ----------------------------<  142<H>  4.3   |  23  |  1.14    Ca    8.8      08 Dec 2021 18:48  Phos  3.1     12-08  Mg     1.9     12-08      PT/INR - ( 08 Dec 2021 01:58 )   PT: 15.1 sec;   INR: 1.27 ratio         PTT - ( 08 Dec 2021 09:47 )  PTT:57.3 sec

## 2021-12-08 NOTE — BRIEF OPERATIVE NOTE - OPERATION/FINDINGS
Iliac stent placement x 2 ([facility])  Iliac thromboembolectomy with 3 and 5 Alexander catheters   Palpable DP and PT pulses at the end of the case
Type 2B endoleak of R limb of previously placed stentgraft relined  Perclose failure at end of case necessiating emergent R femoral cutdown  Repair of R femoral artery and R femoral vein  DP signal of R foot at end of case

## 2021-12-08 NOTE — PROGRESS NOTE ADULT - ASSESSMENT
77 y/o male w/ a PMHx of HTN, HLD, BPH, nephrolithiasis, varicose veins, s/p bilateral ANGELICA, left iliac artery aneurysm, AAA s/p EVAR presenting s/p repair of type 2B endoleak of the right limb of EVAR stent c/b Perclosure failure requiring a right femoral cutdown w/ primary repair of the right femoral artery & vein, hemorrhagic shock requiring vasopressor support, & acute post-op respiratory insufficiency. transferred to floor on 12/5. Recovering well on the floor.    - CTA reviewed: Heparin gtt adjusted according to PTT. F/U PTT. Plan for OR today, to be consented.  - Continue ASA  - Regular diet  - s/p Lasix on 12/5, on 2L NC- now being weaned off   - Pain control  - Monitor Right DP/PT signals  - Monitor vital signs  - F/U AM labs  - Chest PT, Home PT plan      Vascular Surgery  [id number]

## 2021-12-09 ENCOUNTER — RESULT REVIEW (OUTPATIENT)
Age: 76
End: 2021-12-09

## 2021-12-09 LAB
ANION GAP SERPL CALC-SCNC: 11 MMOL/L — SIGNIFICANT CHANGE UP (ref 5–17)
APTT BLD: 24 SEC — LOW (ref 27.5–35.5)
BUN SERPL-MCNC: 22 MG/DL — SIGNIFICANT CHANGE UP (ref 7–23)
CALCIUM SERPL-MCNC: 8.3 MG/DL — LOW (ref 8.4–10.5)
CHLORIDE SERPL-SCNC: 104 MMOL/L — SIGNIFICANT CHANGE UP (ref 96–108)
CO2 SERPL-SCNC: 24 MMOL/L — SIGNIFICANT CHANGE UP (ref 22–31)
CREAT SERPL-MCNC: 1.28 MG/DL — SIGNIFICANT CHANGE UP (ref 0.5–1.3)
GLUCOSE SERPL-MCNC: 124 MG/DL — HIGH (ref 70–99)
HCT VFR BLD CALC: 31.5 % — LOW (ref 39–50)
HGB BLD-MCNC: 10.5 G/DL — LOW (ref 13–17)
INR BLD: 1.16 RATIO — SIGNIFICANT CHANGE UP (ref 0.88–1.16)
MAGNESIUM SERPL-MCNC: 2.4 MG/DL — SIGNIFICANT CHANGE UP (ref 1.6–2.6)
MCHC RBC-ENTMCNC: 29.7 PG — SIGNIFICANT CHANGE UP (ref 27–34)
MCHC RBC-ENTMCNC: 33.3 GM/DL — SIGNIFICANT CHANGE UP (ref 32–36)
MCV RBC AUTO: 89 FL — SIGNIFICANT CHANGE UP (ref 80–100)
NRBC # BLD: 0 /100 WBCS — SIGNIFICANT CHANGE UP (ref 0–0)
PHOSPHATE SERPL-MCNC: 4.5 MG/DL — SIGNIFICANT CHANGE UP (ref 2.5–4.5)
PLATELET # BLD AUTO: 160 K/UL — SIGNIFICANT CHANGE UP (ref 150–400)
POTASSIUM SERPL-MCNC: 4.4 MMOL/L — SIGNIFICANT CHANGE UP (ref 3.5–5.3)
POTASSIUM SERPL-SCNC: 4.4 MMOL/L — SIGNIFICANT CHANGE UP (ref 3.5–5.3)
PROTHROM AB SERPL-ACNC: 13.8 SEC — HIGH (ref 10.6–13.6)
RBC # BLD: 3.54 M/UL — LOW (ref 4.2–5.8)
RBC # FLD: 15.1 % — HIGH (ref 10.3–14.5)
SODIUM SERPL-SCNC: 139 MMOL/L — SIGNIFICANT CHANGE UP (ref 135–145)
WBC # BLD: 9.89 K/UL — SIGNIFICANT CHANGE UP (ref 3.8–10.5)
WBC # FLD AUTO: 9.89 K/UL — SIGNIFICANT CHANGE UP (ref 3.8–10.5)

## 2021-12-09 PROCEDURE — 88304 TISSUE EXAM BY PATHOLOGIST: CPT | Mod: 26

## 2021-12-09 RX ORDER — ACETAMINOPHEN 500 MG
975 TABLET ORAL EVERY 6 HOURS
Refills: 0 | Status: DISCONTINUED | OUTPATIENT
Start: 2021-12-09 | End: 2021-12-11

## 2021-12-09 RX ADMIN — HEPARIN SODIUM 5000 UNIT(S): 5000 INJECTION INTRAVENOUS; SUBCUTANEOUS at 05:44

## 2021-12-09 RX ADMIN — HEPARIN SODIUM 5000 UNIT(S): 5000 INJECTION INTRAVENOUS; SUBCUTANEOUS at 13:41

## 2021-12-09 RX ADMIN — Medication 100 MILLIGRAM(S): at 13:41

## 2021-12-09 RX ADMIN — Medication 100 MILLIGRAM(S): at 22:54

## 2021-12-09 RX ADMIN — Medication 3 MILLIGRAM(S): at 21:57

## 2021-12-09 RX ADMIN — Medication 975 MILLIGRAM(S): at 06:27

## 2021-12-09 RX ADMIN — Medication 1 DROP(S): at 03:56

## 2021-12-09 RX ADMIN — Medication 1 DROP(S): at 17:57

## 2021-12-09 RX ADMIN — Medication 50 MILLIGRAM(S): at 09:28

## 2021-12-09 RX ADMIN — Medication 81 MILLIGRAM(S): at 13:41

## 2021-12-09 RX ADMIN — HEPARIN SODIUM 5000 UNIT(S): 5000 INJECTION INTRAVENOUS; SUBCUTANEOUS at 21:56

## 2021-12-09 RX ADMIN — Medication 100 MILLIGRAM(S): at 21:56

## 2021-12-09 RX ADMIN — Medication 50 MILLIGRAM(S): at 17:57

## 2021-12-09 RX ADMIN — ATORVASTATIN CALCIUM 40 MILLIGRAM(S): 80 TABLET, FILM COATED ORAL at 21:58

## 2021-12-09 RX ADMIN — Medication 100 MILLIGRAM(S): at 05:44

## 2021-12-09 RX ADMIN — Medication 1 DROP(S): at 09:34

## 2021-12-09 RX ADMIN — Medication 975 MILLIGRAM(S): at 13:40

## 2021-12-09 RX ADMIN — Medication 100 MILLIGRAM(S): at 05:45

## 2021-12-09 RX ADMIN — Medication 975 MILLIGRAM(S): at 17:57

## 2021-12-09 RX ADMIN — Medication 975 MILLIGRAM(S): at 23:24

## 2021-12-09 NOTE — PROGRESS NOTE ADULT - ASSESSMENT
77 y/o male w/ a PMHx of HTN, HLD, BPH, nephrolithiasis, varicose veins, s/p bilateral ANGELICA, left iliac artery aneurysm, AAA s/p EVAR presenting s/p repair of type 2B endoleak of the right limb of EVAR stent c/b Perclosure failure requiring a right femoral cutdown w/ primary repair of the right femoral artery & vein, hemorrhagic shock requiring vasopressor support, & acute post-op respiratory insufficiency. transferred to floor on 12/5. Now POD 1 from [facility] thrombectomy and stent placement x 2.     - Mojica d/c, f/u TOV today  - CTA performed  - Continue ASA  - Regular diet  - Pain control  - Monitor Right DP/PT signals  - Monitor vital signs  - AM labs reviewed   - Chest PT  - Dispo:  pt eval today     Vascular Surgery  [id number]

## 2021-12-09 NOTE — PROGRESS NOTE ADULT - SUBJECTIVE AND OBJECTIVE BOX
VASCULAR URGERY DAILY PROGRESS NOTE:     SUBJECTIVE/ROS: Patient feels well. He reports minimal pain. Denies any complaints.   Denies nausea, vomiting, chest pain, shortness of breath     MEDICATIONS  (STANDING):  acetaminophen     Tablet .. 975 milliGRAM(s) Oral every 6 hours  aspirin  chewable 81 milliGRAM(s) Oral daily  atorvastatin 40 milliGRAM(s) Oral at bedtime  benzonatate 100 milliGRAM(s) Oral every 8 hours  ceFAZolin   IVPB      ceFAZolin   IVPB 2000 milliGRAM(s) IV Intermittent every 8 hours  heparin   Injectable 5000 Unit(s) SubCutaneous every 8 hours  melatonin 3 milliGRAM(s) Oral at bedtime  metoprolol tartrate 50 milliGRAM(s) Oral two times a day  tobramycin 0.3% Ophthalmic Solution 1 Drop(s) Right EYE every 4 hours    MEDICATIONS  (PRN):  albuterol/ipratropium for Nebulization 3 milliLiter(s) Nebulizer every 6 hours PRN Wheezing  guaifenesin/dextromethorphan Oral Liquid 10 milliLiter(s) Oral three times a day PRN Chronic cough  ondansetron Injectable 4 milliGRAM(s) IV Push every 6 hours PRN Nausea and/or Vomiting  traMADol 25 milliGRAM(s) Oral every 6 hours PRN Moderate Pain (4 - 6)    OBJECTIVE:  Vital Signs Last 24 Hrs  T(C): 36.3 (09 Dec 2021 05:48), Max: 36.8 (08 Dec 2021 12:33)  T(F): 97.4 (09 Dec 2021 05:48), Max: 98.2 (08 Dec 2021 12:33)  HR: 68 (09 Dec 2021 05:48) (64 - 83)  BP: 142/76 (09 Dec 2021 05:48) (112/68 - 176/97)  BP(mean): 88 (09 Dec 2021 00:00) (88 - 130)  RR: 18 (09 Dec 2021 05:48) (14 - 20)  SpO2: 92% (09 Dec 2021 05:48) (90% - 99%)      I&O's Detail  08 Dec 2021 07:01  -  09 Dec 2021 07:00  --------------------------------------------------------  IN:    dextrose 5% + sodium chloride 0.45%: 450 mL    Heparin: 129 mL    IV PiggyBack: 50 mL  Total IN: 629 mL    OUT:    Indwelling Catheter - Urethral (mL): 2425 mL    Oral Fluid: 0 mL    Voided (mL): 500 mL  Total OUT: 2925 mL  Total NET: -2296 mL      09 Dec 2021 07:01  -  09 Dec 2021 10:04  --------------------------------------------------------  IN:  Total IN: 0 mL    OUT:    Indwelling Catheter - Urethral (mL): 150 mL  Total OUT: 150 mL  Total NET: -150 mL      LABS:                        10.5   9.89  )-----------( 160      ( 09 Dec 2021 06:12 )             31.5     12-09    139  |  104  |  22  ----------------------------<  124<H>  4.4   |  24  |  1.28    Ca    8.3<L>      09 Dec 2021 06:12  Phos  4.5     12-09  Mg     2.4     12-09      PT/INR - ( 09 Dec 2021 06:12 )   PT: 13.8 sec;   INR: 1.16 ratio    PTT - ( 09 Dec 2021 06:12 )  PTT:24.0 sec      PHYSICAL EXAM:  Constitutional: awake, alert, oriented x4, no acute distress, responding appropriately  Abd: soft, nontender, nondistended, right groin dressing w/ minimal strikethrough, staples present right groin.  Extremities:  Right + DP/PT signals

## 2021-12-10 LAB
ANION GAP SERPL CALC-SCNC: 10 MMOL/L — SIGNIFICANT CHANGE UP (ref 5–17)
BUN SERPL-MCNC: 28 MG/DL — HIGH (ref 7–23)
CALCIUM SERPL-MCNC: 8.1 MG/DL — LOW (ref 8.4–10.5)
CHLORIDE SERPL-SCNC: 105 MMOL/L — SIGNIFICANT CHANGE UP (ref 96–108)
CO2 SERPL-SCNC: 24 MMOL/L — SIGNIFICANT CHANGE UP (ref 22–31)
CREAT SERPL-MCNC: 1.48 MG/DL — HIGH (ref 0.5–1.3)
GLUCOSE SERPL-MCNC: 96 MG/DL — SIGNIFICANT CHANGE UP (ref 70–99)
HCT VFR BLD CALC: 30.8 % — LOW (ref 39–50)
HGB BLD-MCNC: 9.9 G/DL — LOW (ref 13–17)
MAGNESIUM SERPL-MCNC: 2.2 MG/DL — SIGNIFICANT CHANGE UP (ref 1.6–2.6)
MCHC RBC-ENTMCNC: 29.6 PG — SIGNIFICANT CHANGE UP (ref 27–34)
MCHC RBC-ENTMCNC: 32.1 GM/DL — SIGNIFICANT CHANGE UP (ref 32–36)
MCV RBC AUTO: 91.9 FL — SIGNIFICANT CHANGE UP (ref 80–100)
NRBC # BLD: 0 /100 WBCS — SIGNIFICANT CHANGE UP (ref 0–0)
PHOSPHATE SERPL-MCNC: 3.5 MG/DL — SIGNIFICANT CHANGE UP (ref 2.5–4.5)
PLATELET # BLD AUTO: 163 K/UL — SIGNIFICANT CHANGE UP (ref 150–400)
POTASSIUM SERPL-MCNC: 4 MMOL/L — SIGNIFICANT CHANGE UP (ref 3.5–5.3)
POTASSIUM SERPL-SCNC: 4 MMOL/L — SIGNIFICANT CHANGE UP (ref 3.5–5.3)
RBC # BLD: 3.35 M/UL — LOW (ref 4.2–5.8)
RBC # FLD: 15.5 % — HIGH (ref 10.3–14.5)
SODIUM SERPL-SCNC: 139 MMOL/L — SIGNIFICANT CHANGE UP (ref 135–145)
WBC # BLD: 7.56 K/UL — SIGNIFICANT CHANGE UP (ref 3.8–10.5)
WBC # FLD AUTO: 7.56 K/UL — SIGNIFICANT CHANGE UP (ref 3.8–10.5)

## 2021-12-10 RX ORDER — SODIUM CHLORIDE 9 MG/ML
1000 INJECTION INTRAMUSCULAR; INTRAVENOUS; SUBCUTANEOUS
Refills: 0 | Status: DISCONTINUED | OUTPATIENT
Start: 2021-12-10 | End: 2021-12-11

## 2021-12-10 RX ADMIN — ATORVASTATIN CALCIUM 40 MILLIGRAM(S): 80 TABLET, FILM COATED ORAL at 21:13

## 2021-12-10 RX ADMIN — HEPARIN SODIUM 5000 UNIT(S): 5000 INJECTION INTRAVENOUS; SUBCUTANEOUS at 14:00

## 2021-12-10 RX ADMIN — HEPARIN SODIUM 5000 UNIT(S): 5000 INJECTION INTRAVENOUS; SUBCUTANEOUS at 21:09

## 2021-12-10 RX ADMIN — Medication 3 MILLIGRAM(S): at 21:10

## 2021-12-10 RX ADMIN — Medication 975 MILLIGRAM(S): at 05:17

## 2021-12-10 RX ADMIN — Medication 975 MILLIGRAM(S): at 12:54

## 2021-12-10 RX ADMIN — SODIUM CHLORIDE 75 MILLILITER(S): 9 INJECTION INTRAMUSCULAR; INTRAVENOUS; SUBCUTANEOUS at 18:01

## 2021-12-10 RX ADMIN — HEPARIN SODIUM 5000 UNIT(S): 5000 INJECTION INTRAVENOUS; SUBCUTANEOUS at 05:16

## 2021-12-10 RX ADMIN — Medication 100 MILLIGRAM(S): at 21:09

## 2021-12-10 RX ADMIN — Medication 50 MILLIGRAM(S): at 18:50

## 2021-12-10 RX ADMIN — Medication 100 MILLIGRAM(S): at 14:00

## 2021-12-10 RX ADMIN — Medication 50 MILLIGRAM(S): at 05:17

## 2021-12-10 RX ADMIN — Medication 100 MILLIGRAM(S): at 14:03

## 2021-12-10 RX ADMIN — Medication 100 MILLIGRAM(S): at 21:10

## 2021-12-10 RX ADMIN — Medication 975 MILLIGRAM(S): at 18:49

## 2021-12-10 RX ADMIN — Medication 81 MILLIGRAM(S): at 12:54

## 2021-12-10 RX ADMIN — Medication 10 MILLILITER(S): at 10:53

## 2021-12-10 RX ADMIN — Medication 100 MILLIGRAM(S): at 05:16

## 2021-12-10 RX ADMIN — Medication 100 MILLIGRAM(S): at 05:17

## 2021-12-10 NOTE — PROGRESS NOTE ADULT - SUBJECTIVE AND OBJECTIVE BOX
VASCULAR SURGERY DAILY PROGRESS NOTE:     24h events: No acute events. Passed TOV.    SUBJECTIVE/ROS: Patient feels well. He denies any complaints.  Denies nausea, vomiting, chest pain, shortness of breath     MEDICATIONS  (STANDING):  acetaminophen     Tablet .. 975 milliGRAM(s) Oral every 6 hours  aspirin  chewable 81 milliGRAM(s) Oral daily  atorvastatin 40 milliGRAM(s) Oral at bedtime  benzonatate 100 milliGRAM(s) Oral every 8 hours  ceFAZolin   IVPB      ceFAZolin   IVPB 2000 milliGRAM(s) IV Intermittent every 8 hours  heparin   Injectable 5000 Unit(s) SubCutaneous every 8 hours  melatonin 3 milliGRAM(s) Oral at bedtime  metoprolol tartrate 50 milliGRAM(s) Oral two times a day    MEDICATIONS  (PRN):  albuterol/ipratropium for Nebulization 3 milliLiter(s) Nebulizer every 6 hours PRN Wheezing  guaifenesin/dextromethorphan Oral Liquid 10 milliLiter(s) Oral three times a day PRN Chronic cough  ondansetron Injectable 4 milliGRAM(s) IV Push every 6 hours PRN Nausea and/or Vomiting  traMADol 25 milliGRAM(s) Oral every 6 hours PRN Moderate Pain (4 - 6)    OBJECTIVE:  Vital Signs Last 24 Hrs  T(C): 36.4 (10 Dec 2021 05:10), Max: 36.7 (10 Dec 2021 01:10)  T(F): 97.6 (10 Dec 2021 05:10), Max: 98.1 (10 Dec 2021 01:10)  HR: 62 (10 Dec 2021 05:10) (61 - 79)  BP: 152/87 (10 Dec 2021 05:10) (123/76 - 152/87)  BP(mean): --  RR: 16 (10 Dec 2021 05:10) (16 - 18)  SpO2: 95% (10 Dec 2021 05:10) (92% - 97%)      I&O's Detail  09 Dec 2021 07:01  -  10 Dec 2021 07:00  --------------------------------------------------------  IN:    IV PiggyBack: 100 mL    Oral Fluid: 480 mL  Total IN: 580 mL    OUT:    Indwelling Catheter - Urethral (mL): 150 mL    Voided (mL): 1100 mL  Total OUT: 1250 mL  Total NET: -670 mL    LABS:                        9.9    7.56  )-----------( 163      ( 10 Dec 2021 06:15 )             30.8     12-10    139  |  105  |  28<H>  ----------------------------<  96  4.0   |  24  |  1.48<H>    Ca    8.1<L>      10 Dec 2021 06:15  Phos  3.5     12-10  Mg     2.2     12-10      PT/INR - ( 09 Dec 2021 06:12 )   PT: 13.8 sec;   INR: 1.16 ratio    PTT - ( 09 Dec 2021 06:12 )  PTT:24.0 sec    PHYSICAL EXAM:  Constitutional: awake, alert, oriented x4, no acute distress, responding appropriately  Respiratory: on 2L N/C  Abd: soft, nontender, nondistended, right groin dressing w/ minimal strikethrough, staples present right groin.  Extremities:  Right + DP/PT signals

## 2021-12-10 NOTE — PROGRESS NOTE ADULT - ASSESSMENT
77 y/o male w/ a PMHx of HTN, HLD, BPH, nephrolithiasis, varicose veins, s/p bilateral ANGELICA, left iliac artery aneurysm, AAA s/p EVAR presenting s/p repair of type 2B endoleak of the right limb of EVAR stent c/b Perclosure failure requiring a right femoral cutdown w/ primary repair of the right femoral artery & vein, hemorrhagic shock requiring vasopressor support, & acute post-op respiratory insufficiency. transferred to floor on 12/5. Now POD 2 from [facility] thrombectomy and stent placement x 2.     - Continue ASA  - Regular diet  - Pain control  - Monitor Right DP/PT signals  - Monitor vital signs  - AM labs reviewed   - Chest PT  - Dispo: Home; home w/ home PT; home with home PT, RW for ambulation, supervision/assist from family as needed     Vascular Surgery  [id number]

## 2021-12-11 ENCOUNTER — TRANSCRIPTION ENCOUNTER (OUTPATIENT)
Age: 76
End: 2021-12-11

## 2021-12-11 VITALS
DIASTOLIC BLOOD PRESSURE: 82 MMHG | RESPIRATION RATE: 18 BRPM | OXYGEN SATURATION: 96 % | TEMPERATURE: 98 F | SYSTOLIC BLOOD PRESSURE: 154 MMHG | HEART RATE: 69 BPM

## 2021-12-11 LAB
ANION GAP SERPL CALC-SCNC: 11 MMOL/L — SIGNIFICANT CHANGE UP (ref 5–17)
BUN SERPL-MCNC: 18 MG/DL — SIGNIFICANT CHANGE UP (ref 7–23)
CALCIUM SERPL-MCNC: 7.9 MG/DL — LOW (ref 8.4–10.5)
CHLORIDE SERPL-SCNC: 104 MMOL/L — SIGNIFICANT CHANGE UP (ref 96–108)
CO2 SERPL-SCNC: 22 MMOL/L — SIGNIFICANT CHANGE UP (ref 22–31)
CREAT SERPL-MCNC: 1.18 MG/DL — SIGNIFICANT CHANGE UP (ref 0.5–1.3)
GLUCOSE SERPL-MCNC: 112 MG/DL — HIGH (ref 70–99)
HCT VFR BLD CALC: 32.4 % — LOW (ref 39–50)
HGB BLD-MCNC: 10.3 G/DL — LOW (ref 13–17)
MCHC RBC-ENTMCNC: 29.3 PG — SIGNIFICANT CHANGE UP (ref 27–34)
MCHC RBC-ENTMCNC: 31.8 GM/DL — LOW (ref 32–36)
MCV RBC AUTO: 92.3 FL — SIGNIFICANT CHANGE UP (ref 80–100)
NRBC # BLD: 0 /100 WBCS — SIGNIFICANT CHANGE UP (ref 0–0)
PLATELET # BLD AUTO: 163 K/UL — SIGNIFICANT CHANGE UP (ref 150–400)
POTASSIUM SERPL-MCNC: 4 MMOL/L — SIGNIFICANT CHANGE UP (ref 3.5–5.3)
POTASSIUM SERPL-SCNC: 4 MMOL/L — SIGNIFICANT CHANGE UP (ref 3.5–5.3)
RBC # BLD: 3.51 M/UL — LOW (ref 4.2–5.8)
RBC # FLD: 14.9 % — HIGH (ref 10.3–14.5)
SODIUM SERPL-SCNC: 137 MMOL/L — SIGNIFICANT CHANGE UP (ref 135–145)
WBC # BLD: 7.62 K/UL — SIGNIFICANT CHANGE UP (ref 3.8–10.5)
WBC # FLD AUTO: 7.62 K/UL — SIGNIFICANT CHANGE UP (ref 3.8–10.5)

## 2021-12-11 PROCEDURE — 97530 THERAPEUTIC ACTIVITIES: CPT

## 2021-12-11 PROCEDURE — C1757: CPT

## 2021-12-11 PROCEDURE — 83605 ASSAY OF LACTIC ACID: CPT

## 2021-12-11 PROCEDURE — 88304 TISSUE EXAM BY PATHOLOGIST: CPT

## 2021-12-11 PROCEDURE — 82565 ASSAY OF CREATININE: CPT

## 2021-12-11 PROCEDURE — 97162 PT EVAL MOD COMPLEX 30 MIN: CPT

## 2021-12-11 PROCEDURE — 85025 COMPLETE CBC W/AUTO DIFF WBC: CPT

## 2021-12-11 PROCEDURE — C1769: CPT

## 2021-12-11 PROCEDURE — 85396 CLOTTING ASSAY WHOLE BLOOD: CPT

## 2021-12-11 PROCEDURE — 82947 ASSAY GLUCOSE BLOOD QUANT: CPT

## 2021-12-11 PROCEDURE — C1725: CPT

## 2021-12-11 PROCEDURE — 80053 COMPREHEN METABOLIC PANEL: CPT

## 2021-12-11 PROCEDURE — 83735 ASSAY OF MAGNESIUM: CPT

## 2021-12-11 PROCEDURE — C9399: CPT

## 2021-12-11 PROCEDURE — 85018 HEMOGLOBIN: CPT

## 2021-12-11 PROCEDURE — U0003: CPT

## 2021-12-11 PROCEDURE — 71045 X-RAY EXAM CHEST 1 VIEW: CPT

## 2021-12-11 PROCEDURE — 82803 BLOOD GASES ANY COMBINATION: CPT

## 2021-12-11 PROCEDURE — 85610 PROTHROMBIN TIME: CPT

## 2021-12-11 PROCEDURE — C1889: CPT

## 2021-12-11 PROCEDURE — 85730 THROMBOPLASTIN TIME PARTIAL: CPT

## 2021-12-11 PROCEDURE — U0005: CPT

## 2021-12-11 PROCEDURE — 86901 BLOOD TYPING SEROLOGIC RH(D): CPT

## 2021-12-11 PROCEDURE — 94002 VENT MGMT INPAT INIT DAY: CPT

## 2021-12-11 PROCEDURE — 84100 ASSAY OF PHOSPHORUS: CPT

## 2021-12-11 PROCEDURE — 97166 OT EVAL MOD COMPLEX 45 MIN: CPT

## 2021-12-11 PROCEDURE — 86850 RBC ANTIBODY SCREEN: CPT

## 2021-12-11 PROCEDURE — 84295 ASSAY OF SERUM SODIUM: CPT

## 2021-12-11 PROCEDURE — P9016: CPT

## 2021-12-11 PROCEDURE — 82435 ASSAY OF BLOOD CHLORIDE: CPT

## 2021-12-11 PROCEDURE — 36415 COLL VENOUS BLD VENIPUNCTURE: CPT

## 2021-12-11 PROCEDURE — 74018 RADEX ABDOMEN 1 VIEW: CPT

## 2021-12-11 PROCEDURE — 76000 FLUOROSCOPY <1 HR PHYS/QHP: CPT

## 2021-12-11 PROCEDURE — P9045: CPT

## 2021-12-11 PROCEDURE — 86022 PLATELET ANTIBODIES: CPT

## 2021-12-11 PROCEDURE — 82962 GLUCOSE BLOOD TEST: CPT

## 2021-12-11 PROCEDURE — C1874: CPT

## 2021-12-11 PROCEDURE — C1753: CPT

## 2021-12-11 PROCEDURE — C1894: CPT

## 2021-12-11 PROCEDURE — 94640 AIRWAY INHALATION TREATMENT: CPT

## 2021-12-11 PROCEDURE — 85014 HEMATOCRIT: CPT

## 2021-12-11 PROCEDURE — 82330 ASSAY OF CALCIUM: CPT

## 2021-12-11 PROCEDURE — C1760: CPT

## 2021-12-11 PROCEDURE — 71045 X-RAY EXAM CHEST 1 VIEW: CPT | Mod: 26

## 2021-12-11 PROCEDURE — 80048 BASIC METABOLIC PNL TOTAL CA: CPT

## 2021-12-11 PROCEDURE — 86923 COMPATIBILITY TEST ELECTRIC: CPT

## 2021-12-11 PROCEDURE — P9037: CPT

## 2021-12-11 PROCEDURE — 97116 GAIT TRAINING THERAPY: CPT

## 2021-12-11 PROCEDURE — 83036 HEMOGLOBIN GLYCOSYLATED A1C: CPT

## 2021-12-11 PROCEDURE — 94003 VENT MGMT INPAT SUBQ DAY: CPT

## 2021-12-11 PROCEDURE — 75635 CT ANGIO ABDOMINAL ARTERIES: CPT

## 2021-12-11 PROCEDURE — 84132 ASSAY OF SERUM POTASSIUM: CPT

## 2021-12-11 PROCEDURE — 86900 BLOOD TYPING SEROLOGIC ABO: CPT

## 2021-12-11 PROCEDURE — P9059: CPT

## 2021-12-11 PROCEDURE — 85027 COMPLETE CBC AUTOMATED: CPT

## 2021-12-11 PROCEDURE — C1887: CPT

## 2021-12-11 RX ADMIN — Medication 50 MILLIGRAM(S): at 06:29

## 2021-12-11 RX ADMIN — HEPARIN SODIUM 5000 UNIT(S): 5000 INJECTION INTRAVENOUS; SUBCUTANEOUS at 06:28

## 2021-12-11 RX ADMIN — Medication 100 MILLIGRAM(S): at 06:32

## 2021-12-11 RX ADMIN — Medication 81 MILLIGRAM(S): at 12:46

## 2021-12-11 RX ADMIN — Medication 100 MILLIGRAM(S): at 06:57

## 2021-12-11 RX ADMIN — Medication 975 MILLIGRAM(S): at 06:28

## 2021-12-11 RX ADMIN — Medication 975 MILLIGRAM(S): at 12:46

## 2021-12-11 NOTE — PROGRESS NOTE ADULT - SUBJECTIVE AND OBJECTIVE BOX
Vascular Surgery Progress Note    SUBJECTIVE  No acute events overnight. Pt seen and examined on morning rounds.    OBJECTIVE  ___________________________________________________  VITAL SIGNS / I&O's   Vital Signs Last 24 Hrs  T(C): 36.7 (11 Dec 2021 08:20), Max: 36.8 (11 Dec 2021 05:17)  T(F): 98 (11 Dec 2021 08:20), Max: 98.3 (11 Dec 2021 05:17)  HR: 57 (11 Dec 2021 08:20) (57 - 70)  BP: 154/87 (11 Dec 2021 08:20) (137/77 - 166/92)  BP(mean): --  RR: 18 (11 Dec 2021 08:20) (18 - 18)  SpO2: 97% (11 Dec 2021 08:20) (94% - 97%)      10 Dec 2021 07:01  -  11 Dec 2021 07:00  --------------------------------------------------------  IN:    Oral Fluid: 670 mL    sodium chloride 0.9%: 900 mL  Total IN: 1570 mL    OUT:    Voided (mL): 1476 mL  Total OUT: 1476 mL    Total NET: 94 mL        ___________________________________________________  PHYSICAL EXAM    Constitutional: awake, alert, oriented x4, no acute distress, responding appropriately  Respiratory: on 2L N/C  Abd: soft, nontender, nondistended, right groin aquacel w/ strikethrough overlying staples.  Extremities:  Right DP palpable, R PT signals    ___________________________________________________  LABS                        10.3   7.62  )-----------( 163      ( 11 Dec 2021 06:56 )             32.4     11 Dec 2021 06:56    137    |  104    |  18     ----------------------------<  112    4.0     |  22     |  1.18     Ca    7.9        11 Dec 2021 06:56  Phos  3.5       10 Dec 2021 06:15  Mg     2.2       10 Dec 2021 06:15    ___________________________________________________  MEDICATIONS  (STANDING):  acetaminophen     Tablet .. 975 milliGRAM(s) Oral every 6 hours  aspirin  chewable 81 milliGRAM(s) Oral daily  atorvastatin 40 milliGRAM(s) Oral at bedtime  benzonatate 100 milliGRAM(s) Oral every 8 hours  ceFAZolin   IVPB      ceFAZolin   IVPB 2000 milliGRAM(s) IV Intermittent every 8 hours  heparin   Injectable 5000 Unit(s) SubCutaneous every 8 hours  melatonin 3 milliGRAM(s) Oral at bedtime  metoprolol tartrate 50 milliGRAM(s) Oral two times a day  sodium chloride 0.9%. 1000 milliLiter(s) (75 mL/Hr) IV Continuous <Continuous>    MEDICATIONS  (PRN):  albuterol/ipratropium for Nebulization 3 milliLiter(s) Nebulizer every 6 hours PRN Wheezing  guaifenesin/dextromethorphan Oral Liquid 10 milliLiter(s) Oral three times a day PRN Chronic cough  ondansetron Injectable 4 milliGRAM(s) IV Push every 6 hours PRN Nausea and/or Vomiting  traMADol 25 milliGRAM(s) Oral every 6 hours PRN Moderate Pain (4 - 6)

## 2021-12-11 NOTE — DISCHARGE NOTE NURSING/CASE MANAGEMENT/SOCIAL WORK - NSDCPEFALRISK_GEN_ALL_CORE
For information on Fall & Injury Prevention, visit: [email or web address] OR  [email or web address] OR  https://www.cdc.gov/steadi/patient.html

## 2021-12-11 NOTE — DISCHARGE NOTE NURSING/CASE MANAGEMENT/SOCIAL WORK - NSDCPEEMAIL_GEN_ALL_CORE
[facility] for Tobacco Control email [email or web address]

## 2021-12-11 NOTE — DISCHARGE NOTE NURSING/CASE MANAGEMENT/SOCIAL WORK - NSDCPEWEB_GEN_ALL_CORE
[facility] for Tobacco Control website --- [email or web address] website --- [email or web address]

## 2021-12-11 NOTE — PROGRESS NOTE ADULT - ASSESSMENT
Assessment:  77 y/o male w/ a PMHx of HTN, HLD, BPH, nephrolithiasis, varicose veins, s/p bilateral ANGELICA, left iliac artery aneurysm, AAA s/p EVAR presenting s/p repair of type 2B endoleak of the right limb of EVAR stent c/b Perclosure failure requiring a right femoral cutdown w/ primary repair of the right femoral artery & vein, hemorrhagic shock requiring vasopressor support, & acute post-op respiratory insufficiency. transferred to floor on 12/5. Now POD3 from [facility] thrombectomy and stent placement x 2.     - Plan for discharge today  - Cr 1.18 from 1.48 after 12h IVF  - Continue ASA  - Regular diet  - Pain control  - Monitor Right DP/PT signals  - Monitor vital signs  - AM labs reviewed   - Chest PT  - Dispo: Home; home w/ home PT; home with home PT, RW for ambulation, supervision/assist from family as needed     Vascular Surgery  [id number]

## 2021-12-11 NOTE — PROGRESS NOTE ADULT - PROVIDER SPECIALTY LIST ADULT
SICU
Vascular Surgery
Anesthesia
SICU
Trauma Surgery
Vascular Surgery

## 2021-12-11 NOTE — DISCHARGE NOTE NURSING/CASE MANAGEMENT/SOCIAL WORK - PATIENT PORTAL LINK FT
You can access the FollowMyHealth Patient Portal offered by [facility] by registering at the following website: [email or web address]. By joining [facility]’s FollowMyHealth portal, you will also be able to view your health information using other applications (apps) compatible with our system.

## 2021-12-13 LAB — SURGICAL PATHOLOGY STUDY: SIGNIFICANT CHANGE UP

## 2021-12-14 ENCOUNTER — NON-APPOINTMENT (OUTPATIENT)
Age: 76
End: 2021-12-14

## 2021-12-15 ENCOUNTER — RX RENEWAL (OUTPATIENT)
Age: 76
End: 2021-12-15

## 2021-12-22 PROCEDURE — 74174 CTA ABD&PLVS W/CONTRAST: CPT | Mod: MA

## 2021-12-22 PROCEDURE — 99285 EMERGENCY DEPT VISIT HI MDM: CPT

## 2021-12-22 PROCEDURE — 83605 ASSAY OF LACTIC ACID: CPT

## 2021-12-22 PROCEDURE — 82803 BLOOD GASES ANY COMBINATION: CPT

## 2021-12-22 PROCEDURE — 84132 ASSAY OF SERUM POTASSIUM: CPT

## 2021-12-22 PROCEDURE — 71275 CT ANGIOGRAPHY CHEST: CPT | Mod: MA

## 2021-12-22 PROCEDURE — 85014 HEMATOCRIT: CPT

## 2021-12-22 PROCEDURE — 86901 BLOOD TYPING SEROLOGIC RH(D): CPT

## 2021-12-22 PROCEDURE — 85610 PROTHROMBIN TIME: CPT

## 2021-12-22 PROCEDURE — 83690 ASSAY OF LIPASE: CPT

## 2021-12-22 PROCEDURE — 80053 COMPREHEN METABOLIC PANEL: CPT

## 2021-12-22 PROCEDURE — 85730 THROMBOPLASTIN TIME PARTIAL: CPT

## 2021-12-22 PROCEDURE — 86900 BLOOD TYPING SEROLOGIC ABO: CPT

## 2021-12-22 PROCEDURE — 81001 URINALYSIS AUTO W/SCOPE: CPT

## 2021-12-22 PROCEDURE — 82435 ASSAY OF BLOOD CHLORIDE: CPT

## 2021-12-22 PROCEDURE — 87086 URINE CULTURE/COLONY COUNT: CPT

## 2021-12-22 PROCEDURE — 96360 HYDRATION IV INFUSION INIT: CPT

## 2021-12-22 PROCEDURE — U0003: CPT

## 2021-12-22 PROCEDURE — 85025 COMPLETE CBC W/AUTO DIFF WBC: CPT

## 2021-12-22 PROCEDURE — 82947 ASSAY GLUCOSE BLOOD QUANT: CPT

## 2021-12-22 PROCEDURE — 86850 RBC ANTIBODY SCREEN: CPT

## 2021-12-22 PROCEDURE — U0005: CPT

## 2021-12-22 PROCEDURE — 85018 HEMOGLOBIN: CPT

## 2021-12-22 PROCEDURE — 84295 ASSAY OF SERUM SODIUM: CPT

## 2021-12-22 PROCEDURE — 93005 ELECTROCARDIOGRAM TRACING: CPT

## 2021-12-22 PROCEDURE — 82330 ASSAY OF CALCIUM: CPT

## 2021-12-27 ENCOUNTER — APPOINTMENT (OUTPATIENT)
Dept: RADIOLOGY | Facility: IMAGING CENTER | Age: 76
End: 2021-12-27

## 2022-01-03 ENCOUNTER — RX RENEWAL (OUTPATIENT)
Age: 77
End: 2022-01-03

## 2022-01-04 ENCOUNTER — LABORATORY RESULT (OUTPATIENT)
Age: 77
End: 2022-01-04

## 2022-01-04 ENCOUNTER — APPOINTMENT (OUTPATIENT)
Dept: INTERNAL MEDICINE | Facility: CLINIC | Age: 77
End: 2022-01-04
Payer: MEDICARE

## 2022-01-04 VITALS
DIASTOLIC BLOOD PRESSURE: 74 MMHG | TEMPERATURE: 95.8 F | SYSTOLIC BLOOD PRESSURE: 112 MMHG | WEIGHT: 196 LBS | HEART RATE: 83 BPM | BODY MASS INDEX: 31.5 KG/M2 | OXYGEN SATURATION: 98 % | HEIGHT: 66 IN

## 2022-01-04 DIAGNOSIS — R05.9 COUGH, UNSPECIFIED: ICD-10-CM

## 2022-01-04 LAB
ALBUMIN SERPL ELPH-MCNC: 4 G/DL
ALP BLD-CCNC: 94 U/L
ALT SERPL-CCNC: 12 U/L
ANION GAP SERPL CALC-SCNC: 15 MMOL/L
AST SERPL-CCNC: 13 U/L
BASOPHILS # BLD AUTO: 0.05 K/UL
BASOPHILS NFR BLD AUTO: 0.7 %
BILIRUB SERPL-MCNC: 0.7 MG/DL
BUN SERPL-MCNC: 25 MG/DL
CALCIUM SERPL-MCNC: 9.2 MG/DL
CHLORIDE SERPL-SCNC: 104 MMOL/L
CHOLEST SERPL-MCNC: 182 MG/DL
CO2 SERPL-SCNC: 22 MMOL/L
CREAT SERPL-MCNC: 1.57 MG/DL
EOSINOPHIL # BLD AUTO: 0.3 K/UL
EOSINOPHIL NFR BLD AUTO: 4.5 %
GLUCOSE SERPL-MCNC: 141 MG/DL
HCT VFR BLD CALC: 44.5 %
HDLC SERPL-MCNC: 33 MG/DL
HGB BLD-MCNC: 14.1 G/DL
IMM GRANULOCYTES NFR BLD AUTO: 0.3 %
LDLC SERPL CALC-MCNC: 114 MG/DL
LYMPHOCYTES # BLD AUTO: 1.14 K/UL
LYMPHOCYTES NFR BLD AUTO: 16.9 %
MAN DIFF?: NORMAL
MCHC RBC-ENTMCNC: 29.3 PG
MCHC RBC-ENTMCNC: 31.7 GM/DL
MCV RBC AUTO: 92.3 FL
MONOCYTES # BLD AUTO: 0.42 K/UL
MONOCYTES NFR BLD AUTO: 6.2 %
NEUTROPHILS # BLD AUTO: 4.8 K/UL
NEUTROPHILS NFR BLD AUTO: 71.4 %
NONHDLC SERPL-MCNC: 149 MG/DL
PLATELET # BLD AUTO: 126 K/UL
POTASSIUM SERPL-SCNC: 4.1 MMOL/L
PROT SERPL-MCNC: 7.5 G/DL
RBC # BLD: 4.82 M/UL
RBC # FLD: 14.6 %
SODIUM SERPL-SCNC: 140 MMOL/L
TRIGL SERPL-MCNC: 172 MG/DL
TSH SERPL-ACNC: 4.31 UIU/ML
URATE SERPL-MCNC: 7 MG/DL
WBC # FLD AUTO: 6.73 K/UL

## 2022-01-04 PROCEDURE — 36415 COLL VENOUS BLD VENIPUNCTURE: CPT

## 2022-01-04 PROCEDURE — 99214 OFFICE O/P EST MOD 30 MIN: CPT | Mod: 25

## 2022-01-04 RX ORDER — METOPROLOL SUCCINATE 50 MG/1
50 TABLET, EXTENDED RELEASE ORAL DAILY
Qty: 90 | Refills: 1 | Status: DISCONTINUED | COMMUNITY
Start: 2021-10-27 | End: 2022-01-04

## 2022-01-04 RX ORDER — VALSARTAN 80 MG/1
80 TABLET, COATED ORAL
Qty: 90 | Refills: 1 | Status: DISCONTINUED | COMMUNITY
Start: 2021-11-23 | End: 2022-01-04

## 2022-01-04 RX ORDER — ATORVASTATIN CALCIUM 20 MG/1
20 TABLET, FILM COATED ORAL
Qty: 30 | Refills: 0 | Status: DISCONTINUED | COMMUNITY
Start: 2020-12-18 | End: 2022-01-04

## 2022-01-04 RX ORDER — ELECTROLYTES/DEXTROSE
SOLUTION, ORAL ORAL DAILY
Qty: 90 | Refills: 2 | Status: ACTIVE | COMMUNITY
Start: 2022-01-04 | End: 1900-01-01

## 2022-01-04 NOTE — HISTORY OF PRESENT ILLNESS
[FreeTextEntry1] : Follow up  [de-identified] : Mr. ELLIOTT CANCINO is a 76 year old man with pmhx of HTN, HLD, BPH, nephrolithiasis, varicose veins, s/p bilateral ANGELICA, left iliac artery aneurysm, AAA s/p EVAR with type 2B endoleak of the right limb of his EVAR w/ surgical repair after perclose failure, anemia, pulmonary nodules, hx kidney stones, CAD who presents for a follow up. He endorses being in good health overall. He has no complaints. Denied cold extremities, numbness, tingling, shortness of breath and chest pain. He has been taking valsartan 80mg daily, HCTZ 12.5mg daily with adequate blood pressure at home. He complains of chronic dry cough, ran out of tessalon perles. \par

## 2022-01-04 NOTE — PHYSICAL EXAM
[No Acute Distress] : no acute distress [Well Nourished] : well nourished [Well Developed] : well developed [Normal Sclera/Conjunctiva] : normal sclera/conjunctiva [EOMI] : extraocular movements intact [Normal Outer Ear/Nose] : the outer ears and nose were normal in appearance [Normal Oropharynx] : the oropharynx was normal [Supple] : supple [No Respiratory Distress] : no respiratory distress  [No Accessory Muscle Use] : no accessory muscle use [Clear to Auscultation] : lungs were clear to auscultation bilaterally [Normal Rate] : normal rate  [Regular Rhythm] : with a regular rhythm [Normal S1, S2] : normal S1 and S2 [No Murmur] : no murmur heard [Pedal Pulses Present] : the pedal pulses are present [No Edema] : there was no peripheral edema [No Extremity Clubbing/Cyanosis] : no extremity clubbing/cyanosis [Soft] : abdomen soft [Non Tender] : non-tender [Non-distended] : non-distended [Normal Bowel Sounds] : normal bowel sounds

## 2022-01-27 ENCOUNTER — RX RENEWAL (OUTPATIENT)
Age: 77
End: 2022-01-27

## 2022-03-24 ENCOUNTER — RX RENEWAL (OUTPATIENT)
Age: 77
End: 2022-03-24

## 2022-05-05 ENCOUNTER — OUTPATIENT (OUTPATIENT)
Dept: OUTPATIENT SERVICES | Facility: HOSPITAL | Age: 77
LOS: 1 days | End: 2022-05-05
Payer: MEDICARE

## 2022-05-05 ENCOUNTER — APPOINTMENT (OUTPATIENT)
Dept: CT IMAGING | Facility: IMAGING CENTER | Age: 77
End: 2022-05-05
Payer: MEDICARE

## 2022-05-05 DIAGNOSIS — Z96.643 PRESENCE OF ARTIFICIAL HIP JOINT, BILATERAL: Chronic | ICD-10-CM

## 2022-05-05 DIAGNOSIS — I71.4 ABDOMINAL AORTIC ANEURYSM, WITHOUT RUPTURE: ICD-10-CM

## 2022-05-05 DIAGNOSIS — Z00.8 ENCOUNTER FOR OTHER GENERAL EXAMINATION: ICD-10-CM

## 2022-05-05 DIAGNOSIS — Z98.890 OTHER SPECIFIED POSTPROCEDURAL STATES: Chronic | ICD-10-CM

## 2022-05-05 PROCEDURE — 74174 CTA ABD&PLVS W/CONTRAST: CPT

## 2022-05-05 PROCEDURE — 82565 ASSAY OF CREATININE: CPT

## 2022-05-05 PROCEDURE — 74174 CTA ABD&PLVS W/CONTRAST: CPT | Mod: 26

## 2022-05-09 ENCOUNTER — APPOINTMENT (OUTPATIENT)
Dept: NEPHROLOGY | Facility: CLINIC | Age: 77
End: 2022-05-09
Payer: MEDICARE

## 2022-05-09 VITALS
DIASTOLIC BLOOD PRESSURE: 83 MMHG | BODY MASS INDEX: 32.78 KG/M2 | HEART RATE: 72 BPM | HEIGHT: 66 IN | WEIGHT: 204 LBS | SYSTOLIC BLOOD PRESSURE: 150 MMHG | OXYGEN SATURATION: 96 % | TEMPERATURE: 96.4 F

## 2022-05-09 DIAGNOSIS — N40.0 BENIGN PROSTATIC HYPERPLASIA WITHOUT LOWER URINARY TRACT SYMPMS: ICD-10-CM

## 2022-05-09 PROCEDURE — 99212 OFFICE O/P EST SF 10 MIN: CPT

## 2022-05-10 LAB
ALBUMIN SERPL ELPH-MCNC: 4.5 G/DL
ANION GAP SERPL CALC-SCNC: 12 MMOL/L
APPEARANCE: CLEAR
BACTERIA: NEGATIVE
BASOPHILS # BLD AUTO: 0.05 K/UL
BASOPHILS NFR BLD AUTO: 0.7 %
BILIRUBIN URINE: NEGATIVE
BLOOD URINE: NEGATIVE
BUN SERPL-MCNC: 33 MG/DL
CALCIUM SERPL-MCNC: 9.4 MG/DL
CHLORIDE SERPL-SCNC: 103 MMOL/L
CHOLEST SERPL-MCNC: 173 MG/DL
CO2 SERPL-SCNC: 24 MMOL/L
COLOR: YELLOW
CREAT SERPL-MCNC: 1.49 MG/DL
EGFR: 49 ML/MIN/1.73M2
EOSINOPHIL # BLD AUTO: 0.19 K/UL
EOSINOPHIL NFR BLD AUTO: 2.8 %
ESTIMATED AVERAGE GLUCOSE: 114 MG/DL
GLUCOSE QUALITATIVE U: NEGATIVE
GLUCOSE SERPL-MCNC: 96 MG/DL
HBA1C MFR BLD HPLC: 5.6 %
HCT VFR BLD CALC: 43.7 %
HDLC SERPL-MCNC: 36 MG/DL
HGB BLD-MCNC: 14.4 G/DL
HYALINE CASTS: 1 /LPF
IMM GRANULOCYTES NFR BLD AUTO: 0.3 %
KETONES URINE: NEGATIVE
LDLC SERPL CALC-MCNC: 92 MG/DL
LEUKOCYTE ESTERASE URINE: NEGATIVE
LYMPHOCYTES # BLD AUTO: 1.25 K/UL
LYMPHOCYTES NFR BLD AUTO: 18.3 %
MAN DIFF?: NORMAL
MCHC RBC-ENTMCNC: 31.2 PG
MCHC RBC-ENTMCNC: 33 GM/DL
MCV RBC AUTO: 94.8 FL
MICROSCOPIC-UA: NORMAL
MONOCYTES # BLD AUTO: 0.54 K/UL
MONOCYTES NFR BLD AUTO: 7.9 %
NEUTROPHILS # BLD AUTO: 4.78 K/UL
NEUTROPHILS NFR BLD AUTO: 70 %
NITRITE URINE: NEGATIVE
NONHDLC SERPL-MCNC: 137 MG/DL
PH URINE: 5.5
PHOSPHATE SERPL-MCNC: 3.2 MG/DL
PLATELET # BLD AUTO: 165 K/UL
POTASSIUM SERPL-SCNC: 4.8 MMOL/L
PROTEIN URINE: NEGATIVE
RBC # BLD: 4.61 M/UL
RBC # FLD: 13.1 %
RED BLOOD CELLS URINE: 0 /HPF
SODIUM SERPL-SCNC: 139 MMOL/L
SPECIFIC GRAVITY URINE: 1.02
SQUAMOUS EPITHELIAL CELLS: 0 /HPF
TRIGL SERPL-MCNC: 223 MG/DL
UROBILINOGEN URINE: NORMAL
WBC # FLD AUTO: 6.83 K/UL
WHITE BLOOD CELLS URINE: 0 /HPF

## 2022-05-10 NOTE — REVIEW OF SYSTEMS
[Eyesight Problems] : eyesight problems [Loss Of Hearing] : hearing loss [Lower Ext Edema] : lower extremity edema [SOB on Exertion] : shortness of breath during exertion [Nocturia] : nocturia [Arthralgias] : arthralgias [Dizziness] : dizziness [Easy Bleeding] : a tendency for easy bleeding [Recent Weight Gain (___ Lbs)] : no recent weight gain [Recent Weight Loss (___ Lbs)] : no recent weight loss [Chest Pain] : no chest pain [Palpitations] : no palpitations [Constipation] : no constipation [Diarrhea] : no diarrhea [Heartburn] : no heartburn [Itching] : no itching [Fainting] : no fainting [Anxiety] : no anxiety [Depression] : no depression [Muscle Weakness] : no muscle weakness [FreeTextEntry9] : shoulder

## 2022-05-10 NOTE — HISTORY OF PRESENT ILLNESS
[FreeTextEntry1] : A case  CKD with hypertension, hyperlipidemia, s/p aortic stent placement and repair right femoral vein. Patient has come for follow-up of CKD.

## 2022-05-10 NOTE — ASSESSMENT
[FreeTextEntry1] : A case  CKD with hypertension, hyperlipidemia, s/p aortic stent placement and repair right femoral vein. Patient has come for follow-up of CKD. Weight is stable. There is a mild increase in systolic BP, however, patient says it is normal at home. Patient is going to have angiography.  Advised renal function tests. \par lab tests discussed with the wife. There is mild decrease in serum creatinine from 1.57 to 1.49  mg/dl. A1C 5.6%,\par Total cholesterol within acceptable range. Urine analysis normal. Advised to monitor BP.

## 2022-05-10 NOTE — PHYSICAL EXAM
[General Appearance - Alert] : alert [General Appearance - In No Acute Distress] : in no acute distress [General Appearance - Well Nourished] : well nourished [Sclera] : the sclera and conjunctiva were normal [PERRL With Normal Accommodation] : pupils were equal in size, round, and reactive to light [Outer Ear] : the ears and nose were normal in appearance [Both Tympanic Membranes Were Examined] : both tympanic membranes were normal [Neck Appearance] : the appearance of the neck was normal [] : no respiratory distress [Respiration, Rhythm And Depth] : normal respiratory rhythm and effort [Exaggerated Use Of Accessory Muscles For Inspiration] : no accessory muscle use [Apical Impulse] : the apical impulse was normal [Heart Rate And Rhythm] : heart rate was normal and rhythm regular [Heart Sounds] : normal S1 and S2 [Edema] : there was no peripheral edema [Bowel Sounds] : normal bowel sounds [Abdomen Soft] : soft [Abdomen Tenderness] : non-tender [Cervical Lymph Nodes Enlarged Posterior Bilaterally] : posterior cervical [Cervical Lymph Nodes Enlarged Anterior Bilaterally] : anterior cervical [Supraclavicular Lymph Nodes Enlarged Bilaterally] : supraclavicular [No CVA Tenderness] : no ~M costovertebral angle tenderness [No Spinal Tenderness] : no spinal tenderness [Abnormal Walk] : normal gait [Musculoskeletal - Swelling] : no joint swelling seen [Skin Color & Pigmentation] : normal skin color and pigmentation [Oriented To Time, Place, And Person] : oriented to person, place, and time [FreeTextEntry1] : Backache

## 2022-06-13 NOTE — PROGRESS NOTE ADULT - ASSESSMENT
77 y/o male w/ a PMHx of HTN, HLD, BPH, nephrolithiasis, varicose veins, s/p bilateral ANGELICA, left iliac artery aneurysm, AAA s/p EVAR presenting s/p repair of type 2B endoleak of the right limb of EVAR stent c/b Perclosure failure requiring a right femoral cutdown w/ primary repair of the right femoral artery & vein, hemorrhagic shock requiring vasopressor support, & acute post-op respiratory insufficiency      NEURO:  - IV tylenol, tramadol, dilaudid PRN      RESPIRATORY: H/o tracheal stenosis, intubated with 6.0 ETT in OR  - extubated 12/2 to HFNC 50/50  - CXR in AM  - duonebs Q6    CARDIOVASCULAR: Hemorrhagic shock, resolving s/p resuscitation in OR  - s/p AAA endoleak repair c/b intraop RIGHT fem art/vein injury with fem cutdown and repair  - resuscitated with 6 u pRBC, 1u FFP, 1 u PLT  - off pressors     GI/NUTRITION:  NPO pending bowel function  OGT to suction  protonix qD    GENITOURINARY/RENAL: SHANA on CKD, likely hemodynamically mediated  SCr downtrending  Mojica in place  mIVF qwith LD5/045NS @ 30ml/hr cc/hr  ctt UOP and SCr  electrolytes wnl    HEMATOLOGIC:  Holding chemoppx, SCDs in place  s/p multiple transfusions in OR  trend cbc q8h    INFECTIOUS DISEASE:  s/p Periop ancef x24 hours    ENDOCRINE:  ISS Q6    DISPO:  SICU  full code        83y F DM, HTN, CAD (s/p stents), "Arrythmia", presenting s/p fall at home. Patient got out of bed around midnight, fell to floor, hit head, now with right eye swelling. Patient family member at bedside reports patient has had frequent falls, last fell when she slipped in bathtub. Patient has recently been having palpitations, recently started on propanolol to take as needed. Patient walks with cane at baseline. Denies CP, SOB, LOC, confusion, vomiting.    syncope  - tele monitor  - echo  - orthostatics    bradycardia  - stop cored, propanolol  - also takes metoprolol prn  - tele monitor    diabetes  - fq qid  - hgb a1c  - insulin ss  - diabetic diet    HTN  - monitor  - hold b blockers    dvt px  - sq heparin    dispo  - pt eval

## 2022-06-15 ENCOUNTER — APPOINTMENT (OUTPATIENT)
Dept: NEPHROLOGY | Facility: CLINIC | Age: 77
End: 2022-06-15

## 2022-07-21 ENCOUNTER — RX RENEWAL (OUTPATIENT)
Age: 77
End: 2022-07-21

## 2022-07-29 LAB
ANION GAP SERPL CALC-SCNC: 11 MMOL/L
APPEARANCE: CLEAR
BACTERIA: NEGATIVE
BILIRUBIN URINE: NEGATIVE
BLOOD URINE: NEGATIVE
BUN SERPL-MCNC: 30 MG/DL
CALCIUM SERPL-MCNC: 9 MG/DL
CHLORIDE SERPL-SCNC: 105 MMOL/L
CO2 SERPL-SCNC: 25 MMOL/L
COLOR: NORMAL
CREAT SERPL-MCNC: 1.46 MG/DL
CREAT SPEC-SCNC: 89 MG/DL
EGFR: 50 ML/MIN/1.73M2
GLUCOSE QUALITATIVE U: NEGATIVE
GLUCOSE SERPL-MCNC: 91 MG/DL
HYALINE CASTS: 0 /LPF
KETONES URINE: NEGATIVE
LEUKOCYTE ESTERASE URINE: NEGATIVE
MICROALBUMIN 24H UR DL<=1MG/L-MCNC: <1.2 MG/DL
MICROALBUMIN/CREAT 24H UR-RTO: NORMAL MG/G
MICROSCOPIC-UA: NORMAL
NITRITE URINE: NEGATIVE
PH URINE: 6.5
POTASSIUM SERPL-SCNC: 4.7 MMOL/L
PROTEIN URINE: NEGATIVE
RED BLOOD CELLS URINE: 1 /HPF
SODIUM SERPL-SCNC: 141 MMOL/L
SPECIFIC GRAVITY URINE: 1.02
SQUAMOUS EPITHELIAL CELLS: 0 /HPF
UROBILINOGEN URINE: NORMAL
WHITE BLOOD CELLS URINE: 0 /HPF

## 2022-10-06 DIAGNOSIS — J45.909 UNSPECIFIED ASTHMA, UNCOMPLICATED: ICD-10-CM

## 2022-10-06 DIAGNOSIS — E55.9 VITAMIN D DEFICIENCY, UNSPECIFIED: ICD-10-CM

## 2022-10-06 DIAGNOSIS — N20.0 CALCULUS OF KIDNEY: ICD-10-CM

## 2022-11-07 LAB
ALBUMIN SERPL ELPH-MCNC: 4 G/DL
ALP BLD-CCNC: 68 U/L
ALT SERPL-CCNC: 19 U/L
ANION GAP SERPL CALC-SCNC: 11 MMOL/L
AST SERPL-CCNC: 16 U/L
BASOPHILS # BLD AUTO: 0.04 K/UL
BASOPHILS NFR BLD AUTO: 0.6 %
BILIRUB SERPL-MCNC: 0.7 MG/DL
BUN SERPL-MCNC: 26 MG/DL
CALCIUM SERPL-MCNC: 9.2 MG/DL
CHLORIDE SERPL-SCNC: 105 MMOL/L
CHOLEST SERPL-MCNC: 174 MG/DL
CO2 SERPL-SCNC: 26 MMOL/L
CREAT SERPL-MCNC: 1.53 MG/DL
EGFR: 47 ML/MIN/1.73M2
EOSINOPHIL # BLD AUTO: 0.18 K/UL
EOSINOPHIL NFR BLD AUTO: 2.8 %
ESTIMATED AVERAGE GLUCOSE: 120 MG/DL
GLUCOSE SERPL-MCNC: 93 MG/DL
HBA1C MFR BLD HPLC: 5.8 %
HCT VFR BLD CALC: 39 %
HDLC SERPL-MCNC: 38 MG/DL
HGB BLD-MCNC: 12.9 G/DL
IMM GRANULOCYTES NFR BLD AUTO: 0.3 %
LDLC SERPL CALC-MCNC: 111 MG/DL
LYMPHOCYTES # BLD AUTO: 1.26 K/UL
LYMPHOCYTES NFR BLD AUTO: 19.5 %
MAN DIFF?: NORMAL
MCHC RBC-ENTMCNC: 31.2 PG
MCHC RBC-ENTMCNC: 33.1 GM/DL
MCV RBC AUTO: 94.2 FL
MONOCYTES # BLD AUTO: 0.57 K/UL
MONOCYTES NFR BLD AUTO: 8.8 %
NEUTROPHILS # BLD AUTO: 4.38 K/UL
NEUTROPHILS NFR BLD AUTO: 68 %
NONHDLC SERPL-MCNC: 137 MG/DL
PLATELET # BLD AUTO: 154 K/UL
POTASSIUM SERPL-SCNC: 4.5 MMOL/L
PROT SERPL-MCNC: 6.6 G/DL
PSA SERPL-MCNC: 2.16 NG/ML
RBC # BLD: 4.14 M/UL
RBC # FLD: 13.7 %
SODIUM SERPL-SCNC: 141 MMOL/L
TRIGL SERPL-MCNC: 126 MG/DL
TSH SERPL-ACNC: 2.61 UIU/ML
URATE SERPL-MCNC: 8.7 MG/DL
VIT B12 SERPL-MCNC: 363 PG/ML
WBC # FLD AUTO: 6.45 K/UL

## 2022-11-08 ENCOUNTER — APPOINTMENT (OUTPATIENT)
Dept: INTERNAL MEDICINE | Facility: CLINIC | Age: 77
End: 2022-11-08

## 2022-11-08 ENCOUNTER — NON-APPOINTMENT (OUTPATIENT)
Age: 77
End: 2022-11-08

## 2022-11-08 VITALS
WEIGHT: 209 LBS | HEIGHT: 66 IN | TEMPERATURE: 97.3 F | HEART RATE: 66 BPM | DIASTOLIC BLOOD PRESSURE: 74 MMHG | OXYGEN SATURATION: 95 % | BODY MASS INDEX: 33.59 KG/M2 | SYSTOLIC BLOOD PRESSURE: 130 MMHG

## 2022-11-08 DIAGNOSIS — I25.10 ATHEROSCLEROTIC HEART DISEASE OF NATIVE CORONARY ARTERY W/OUT ANGINA PECTORIS: ICD-10-CM

## 2022-11-08 DIAGNOSIS — B35.3 TINEA PEDIS: ICD-10-CM

## 2022-11-08 DIAGNOSIS — Z23 ENCOUNTER FOR IMMUNIZATION: ICD-10-CM

## 2022-11-08 PROCEDURE — G0439: CPT

## 2022-11-08 PROCEDURE — G0008: CPT

## 2022-11-08 PROCEDURE — 93000 ELECTROCARDIOGRAM COMPLETE: CPT | Mod: 59

## 2022-11-08 PROCEDURE — 90662 IIV NO PRSV INCREASED AG IM: CPT

## 2022-11-08 PROCEDURE — G0444 DEPRESSION SCREEN ANNUAL: CPT

## 2022-11-08 RX ORDER — ASPIRIN 81 MG/1
81 TABLET ORAL
Qty: 90 | Refills: 4 | Status: ACTIVE | COMMUNITY
Start: 2022-11-08

## 2022-11-08 RX ORDER — EZETIMIBE 10 MG/1
10 TABLET ORAL
Qty: 90 | Refills: 3 | Status: ACTIVE | COMMUNITY
Start: 2022-11-08 | End: 1900-01-01

## 2022-11-09 PROBLEM — I25.10 CORONARY ARTERY DISEASE: Status: ACTIVE | Noted: 2022-11-08

## 2022-11-09 NOTE — PHYSICAL EXAM
[Well Nourished] : well nourished [Well Developed] : well developed [Normal Sclera/Conjunctiva] : normal sclera/conjunctiva [PERRL] : pupils equal round and reactive to light [EOMI] : extraocular movements intact [Normal Outer Ear/Nose] : the outer ears and nose were normal in appearance [Normal Oropharynx] : the oropharynx was normal [No JVD] : no jugular venous distention [No Lymphadenopathy] : no lymphadenopathy [Supple] : supple [Thyroid Normal, No Nodules] : the thyroid was normal and there were no nodules present [No Respiratory Distress] : no respiratory distress  [No Accessory Muscle Use] : no accessory muscle use [Clear to Auscultation] : lungs were clear to auscultation bilaterally [Normal Rate] : normal rate  [Regular Rhythm] : with a regular rhythm [Normal S1, S2] : normal S1 and S2 [No Murmur] : no murmur heard [Pedal Pulses Present] : the pedal pulses are present [No Edema] : there was no peripheral edema [No Palpable Aorta] : no palpable aorta [No Extremity Clubbing/Cyanosis] : no extremity clubbing/cyanosis [Soft] : abdomen soft [Non Tender] : non-tender [Non-distended] : non-distended [No Masses] : no abdominal mass palpated [Normal Bowel Sounds] : normal bowel sounds [Normal Supraclavicular Nodes] : no supraclavicular lymphadenopathy [Normal Posterior Cervical Nodes] : no posterior cervical lymphadenopathy [Normal Anterior Cervical Nodes] : no anterior cervical lymphadenopathy [No CVA Tenderness] : no CVA  tenderness [No Spinal Tenderness] : no spinal tenderness [No Joint Swelling] : no joint swelling [Grossly Normal Strength/Tone] : grossly normal strength/tone [No Rash] : no rash [Coordination Grossly Intact] : coordination grossly intact [No Focal Deficits] : no focal deficits [Normal Gait] : normal gait [Normal Affect] : the affect was normal [Normal Insight/Judgement] : insight and judgment were intact [Comprehensive Foot Exam Normal] : Right and left foot were examined and both feet are normal. No ulcers in either foot. Toes are normal and with full ROM.  Normal tactile sensation with monofilament testing throughout both feet [No Acute Distress] : no acute distress

## 2022-11-09 NOTE — DATA REVIEWED
[FreeTextEntry1] : Reviewed previous labs and imaging. Updated patient's record. Discussed plan as below with patient. \par \par ECG- Sinus Bradycardia HR 58, RBBB, Negative T waves. Abnormal.

## 2022-11-09 NOTE — HEALTH RISK ASSESSMENT
[Very Good] : ~his/her~  mood as very good [No] : In the past 12 months have you used drugs other than those required for medical reasons? No [No falls in past year] : Patient reported no falls in the past year [0] : 2) Feeling down, depressed, or hopeless: Not at all (0) [PHQ-2 Negative - No further assessment needed] : PHQ-2 Negative - No further assessment needed [No Retinopathy] : No retinopathy [None] : None [With Family] : lives with family [Retired] : retired [Feels Safe at Home] : Feels safe at home [Fully functional (bathing, dressing, toileting, transferring, walking, feeding)] : Fully functional (bathing, dressing, toileting, transferring, walking, feeding) [Fully functional (using the telephone, shopping, preparing meals, housekeeping, doing laundry, using] : Fully functional and needs no help or supervision to perform IADLs (using the telephone, shopping, preparing meals, housekeeping, doing laundry, using transportation, managing medications and managing finances) [Smoke Detector] : smoke detector [Carbon Monoxide Detector] : carbon monoxide detector [Seat Belt] :  uses seat belt [Sunscreen] : uses sunscreen [With Patient/Caregiver] : , with patient/caregiver [Former] : Former [] :  [Reviewed no changes] : Reviewed, no changes [de-identified] : walking [de-identified] : balanced [[id number]] : 0 [EyeExamDate] : 10/20 [AdvancecareDate] : 11/22 [FreeTextEntry4] : Provided HCP form in the past, wife is HCP.

## 2022-11-09 NOTE — REVIEW OF SYSTEMS
[Nocturia] : nocturia [Negative] : Heme/Lymph [Fever] : no fever [Chills] : no chills [Fatigue] : no fatigue [Night Sweats] : no night sweats [Abdominal Pain] : no abdominal pain [Chest Pain] : no chest pain [Nausea] : no nausea [Dysuria] : no dysuria [Incontinence] : no incontinence [Hesitancy] : no hesitancy [Hematuria] : no hematuria [Frequency] : no frequency

## 2022-11-09 NOTE — ASSESSMENT
[FreeTextEntry1] : 1) HCM Encouraged healthy meals and snacks, reduced food intake, and increased physical activity as tolerated for weight reduction/maintenance. Pneumovax and TDAP UTD. Flu today.  Lab drawn ahead of visit and discussed today.  Referral provided as below. HCP form provided in the past. Colonoscopy utd. \par \par 2) CKD- check metabolic panel. Follow up with nephrologist. He has kidney atrophy with kidney stone. \par \par 3) AAA s/p repair - follow up with surgeon and cardiologist. their records to be faxed over. \par \par 4) HTN- c/w valsartan and metoprolol succinate. Monitor bp at home. c/w statin therapy for hyperlipidemia. \par \par All patient questions answered today and understood by patient. Henceforth, Patient to schedule follow up in 4 months or if new symptoms, questions, renewals or health concerns.

## 2022-11-09 NOTE — HISTORY OF PRESENT ILLNESS
[FreeTextEntry1] : Annual wellness visit  [de-identified] : Mr. 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 He endorses being in good mood and health. He is taking medications as prescribed. He saw cardiologist, was prescribed a new medicaitons which he did not take. He is pending a test. Records to be faxed over.  His complain is Foot fungus, sending ketoconazole today. He is concerned about the side effects to terbinafine for nail fungus. He denied fever, chills, bodyaches, nausea, and vomiting.

## 2023-02-02 NOTE — PROGRESS NOTE ADULT - PROBLEM SELECTOR PROBLEM 7
Left VM for patient to call back. Please inform patient that her C-9 for an EMG was denied by her [facility]. At this time, she will need to reach out to her  to find out next steps. This has been scanned in to media.
Need for prophylactic measure

## 2023-02-19 NOTE — H&P PST ADULT - DOCUMENT STATUS
Bed in lowest position, wheels locked, appropriate side rails in place/Call bell, personal items and telephone in reach/Instruct patient to call for assistance before getting out of bed or chair/Non-slip footwear when patient is out of bed/[place] to call system/Physically safe environment - no spills, clutter or unnecessary equipment/Purposeful Proactive Rounding/Room/bathroom lighting operational, light cord in reach Authored by Resident/PA/NP

## 2023-03-02 NOTE — ED PROVIDER NOTE - ATTENDING CONTRIBUTION TO CARE
Patient with acute tongue/facial swelling following using Nystatin S&S. Woke up around midnight with facial swelling, burning, and pain. Endorses difficulty speaking and swallowing, but denied difficulty breathing.  - Administered Epi, Benadryl, and Solumedrol in EMS with moderate improvement in symptoms  - Denies F/C, N/V, shortness of breath, dysphagia, odynophagia, chest pain, or rash  - CT with minimal soft tissue swelling of the left lateral lower lip in this patient with reported recent tooth extraction.  No evidence of organized fluid collection or significant oropharyngeal soft tissue stranding.  - Given 2 mg Morphine in ED  - Only complaining of localized pain upon eval  - Tolerating a diet  - Will transition patient from Nystatin to Fluconazole   I supervised care.  briefly, 75yr M hx of abd aneurysm s/p repair, arthritis, bilat hip replacement rt forearm wrist pain pain. rt hand dominant. is a  but is recently returned to work after a long term non use, he had to go back to work since his son had vertigo and was not working. reports progressive pain the past few days, no skin tears, no trauma to hand no neck pain, no back pain, no cp, sob, no abd pain. had one episode of vomiting upon receiving morphine. not imrpoved.  exam non focal neurologically, distand cardiac sounds, clear lungs, no abd pain or distention, intact pulse in rt hand, mild swelling of wrist and 1-2digits, normal nv exam.   concern for acute arthirits, overuse. low concern for septic joint, or major vessel disease. will do labs, xrays. pain control.

## 2023-03-10 ENCOUNTER — RX RENEWAL (OUTPATIENT)
Age: 78
End: 2023-03-10

## 2023-04-05 ENCOUNTER — APPOINTMENT (OUTPATIENT)
Dept: CT IMAGING | Facility: IMAGING CENTER | Age: 78
End: 2023-04-05
Payer: MEDICARE

## 2023-04-05 ENCOUNTER — OUTPATIENT (OUTPATIENT)
Dept: OUTPATIENT SERVICES | Facility: HOSPITAL | Age: 78
LOS: 1 days | End: 2023-04-05
Payer: MEDICARE

## 2023-04-05 DIAGNOSIS — Z96.643 PRESENCE OF ARTIFICIAL HIP JOINT, BILATERAL: Chronic | ICD-10-CM

## 2023-04-05 DIAGNOSIS — Z00.8 ENCOUNTER FOR OTHER GENERAL EXAMINATION: ICD-10-CM

## 2023-04-05 DIAGNOSIS — Z98.890 OTHER SPECIFIED POSTPROCEDURAL STATES: Chronic | ICD-10-CM

## 2023-04-05 PROCEDURE — 74174 CTA ABD&PLVS W/CONTRAST: CPT | Mod: 26

## 2023-04-05 PROCEDURE — 74174 CTA ABD&PLVS W/CONTRAST: CPT

## 2023-04-19 ENCOUNTER — APPOINTMENT (OUTPATIENT)
Dept: INTERNAL MEDICINE | Facility: CLINIC | Age: 78
End: 2023-04-19

## 2023-04-19 ENCOUNTER — APPOINTMENT (OUTPATIENT)
Dept: SURGERY | Facility: CLINIC | Age: 78
End: 2023-04-19
Payer: MEDICARE

## 2023-04-19 VITALS
HEIGHT: 67 IN | RESPIRATION RATE: 16 BRPM | BODY MASS INDEX: 32.49 KG/M2 | DIASTOLIC BLOOD PRESSURE: 85 MMHG | OXYGEN SATURATION: 96 % | SYSTOLIC BLOOD PRESSURE: 143 MMHG | WEIGHT: 207 LBS | TEMPERATURE: 96.4 F | HEART RATE: 57 BPM

## 2023-04-19 DIAGNOSIS — K42.9 UMBILICAL HERNIA W/OUT OBSTRUCTION OR GANGRENE: ICD-10-CM

## 2023-04-19 DIAGNOSIS — K40.90 UNILATERAL INGUINAL HERNIA, W/OUT OBSTRUCTION OR GANGRENE, NOT SPECIFIED AS RECURRENT: ICD-10-CM

## 2023-04-19 PROCEDURE — 99204 OFFICE O/P NEW MOD 45 MIN: CPT

## 2023-04-19 RX ORDER — ASPIRIN 81 MG
81 TABLET, DELAYED RELEASE (ENTERIC COATED) ORAL DAILY
Qty: 30 | Refills: 0 | Status: DISCONTINUED | COMMUNITY
End: 2023-04-19

## 2023-04-19 RX ORDER — KETOCONAZOLE 20.5 MG/ML
2 SHAMPOO, SUSPENSION TOPICAL DAILY
Qty: 1 | Refills: 0 | Status: DISCONTINUED | COMMUNITY
Start: 2020-10-09 | End: 2023-04-19

## 2023-04-19 RX ORDER — POLYETHYLENE GLYCOL 3350 17 G/17G
17 POWDER, FOR SOLUTION ORAL
Qty: 24 | Refills: 1 | Status: DISCONTINUED | COMMUNITY
Start: 2021-10-27 | End: 2023-04-19

## 2023-04-19 RX ORDER — MUPIROCIN 20 MG/G
2 OINTMENT TOPICAL
Qty: 2 | Refills: 0 | Status: DISCONTINUED | COMMUNITY
Start: 2020-03-03 | End: 2023-04-19

## 2023-04-19 RX ORDER — BENZONATATE 100 MG/1
100 CAPSULE ORAL 3 TIMES DAILY
Qty: 90 | Refills: 1 | Status: DISCONTINUED | COMMUNITY
Start: 2022-01-04 | End: 2023-04-19

## 2023-04-19 NOTE — DATA REVIEWED
[FreeTextEntry1] : I reviewed the CAT scan images from April 5.  Demonstrate a large right inguinal hernia and a small umbilical hernia.

## 2023-04-19 NOTE — PHYSICAL EXAM
[Normal Breath Sounds] : Normal breath sounds [Normal Heart Sounds] : normal heart sounds [No Rash or Lesion] : No rash or lesion [Calm] : calm [de-identified] : No distress [de-identified] : Sclera clear [de-identified] : Supple [de-identified] : Soft and nontender.  There is a small reducible nontender umbilical hernia.  There is a large, nontender reducible right inguinal hernia. [de-identified] : Penis and testicles normal [de-identified] : No clubbing cyanosis or edema

## 2023-04-19 NOTE — CONSULT LETTER
[Dear  ___] : Dear  [unfilled], [Consult Letter:] : I had the pleasure of evaluating your patient, [unfilled]. [Please see my note below.] : Please see my note below. [Consult Closing:] : Thank you very much for allowing me to participate in the care of this patient.  If you have any questions, please do not hesitate to contact me. [Sincerely,] : Sincerely, [FreeTextEntry3] : Avi Byrne MD, FACS\par [facility]\par [facility]\par [address]\par [place]  [address]\par Tel: [phone]\par Email: [email or web address] \par  [DrMorro  ___] : Dr. CREWS [DrMorro ___] : Dr. CREWS

## 2023-04-19 NOTE — HISTORY OF PRESENT ILLNESS
[de-identified] : Jerrell is a 77 year old male here for a consultation for right inguinal hernia. [de-identified] : This 77-year-old patient complains of a painless bulge in the right groin that he feels has been present for a month or 2.  He denies any pain from this hernia.  He denies any change in bowel or urinary habits.  He states that the bulge reduces spontaneously when he is in the supine position.  He denies any umbilical pain.  The patient had a endovascular repair of abdominal aortic aneurysm previously.  He tells me that he is now undergoing a work-up for problems in the right femoral artery.  He is to see Dr. Ramirez at [facility] [place] as a second opinion to evaluate his right femoral artery.

## 2023-04-19 NOTE — ASSESSMENT
[FreeTextEntry1] : This patient is suffering from a right inguinal hernia.  I have offered him repair of this hernia.  The procedure as well as risks(including, but not limited to bleeding, infection, injury to hollow viscus, chronic pain, testicular atrophy), benefits (pain relief), and alternatives were explained to the patient.\par \par I will plan his inguinal hernia repair once he has seen his vascular surgeon and his second opinion vascular surgeon, as long as no further vascular procedures are planned.\par \par Please advise me on the safety of general anesthesia for this pleasant patient as well as discontinuing his aspirin 5 days preoperatively.

## 2023-04-20 LAB
MRSA SPEC QL CULT: NOT DETECTED
STAPH AUREUS (SA): NOT DETECTED

## 2023-05-15 NOTE — ED ADULT NURSE NOTE - CAS TRG GENERAL NORM CIRC DET
Contacted patient to let them know that he does not have a clot  He continues to have cramping pain in his foot - he is now on Gabapentin 300mg Q12hr and tolerating it and Baclofen 10mg BID and tolerating it  I reviewed that they can go up to Baclofen 10mg TID to help with his spasms, and provided some other strategies  I do think he would benefit from botulinum toxin, and they would like to proceed with this  I will reach out to the Botox team to see if we can get some covered by the time I see him again on 6/7  Would target a small amount to dorsiflexors (pain over the top of his foot and dorsiflexion spasms), I would do gastrocs too due to co-contraction (hopefully avoid any plantarflexion predominance after do dorsiflexors), and R wrist flexors  Would request 200 units to start  I reviewed Neurology's recent note from 5/10  Appreciate their recs very much       Maria Fernanda Juan MD  Physical Medicine and Rehabilitation Strong peripheral pulses/Capillary refill less/equal to 2 seconds

## 2023-05-25 ENCOUNTER — RX RENEWAL (OUTPATIENT)
Age: 78
End: 2023-05-25

## 2023-06-05 ENCOUNTER — APPOINTMENT (OUTPATIENT)
Dept: NEPHROLOGY | Facility: CLINIC | Age: 78
End: 2023-06-05
Payer: MEDICARE

## 2023-06-05 VITALS
BODY MASS INDEX: 31.86 KG/M2 | OXYGEN SATURATION: 97 % | HEART RATE: 64 BPM | HEIGHT: 67 IN | SYSTOLIC BLOOD PRESSURE: 115 MMHG | TEMPERATURE: 97.3 F | DIASTOLIC BLOOD PRESSURE: 62 MMHG | WEIGHT: 203 LBS

## 2023-06-05 DIAGNOSIS — I71.40 ABDOMINAL AORTIC ANEURYSM, WITHOUT RUPTURE, UNSPECIFIED: ICD-10-CM

## 2023-06-05 PROCEDURE — 99213 OFFICE O/P EST LOW 20 MIN: CPT

## 2023-06-06 LAB
ALBUMIN SERPL ELPH-MCNC: 4.2 G/DL
ANION GAP SERPL CALC-SCNC: 11 MMOL/L
APPEARANCE: CLEAR
BACTERIA: NEGATIVE /HPF
BILIRUBIN URINE: NEGATIVE
BLOOD URINE: NEGATIVE
BUN SERPL-MCNC: 38 MG/DL
CALCIUM SERPL-MCNC: 9.3 MG/DL
CAST: 0 /LPF
CHLORIDE SERPL-SCNC: 103 MMOL/L
CO2 SERPL-SCNC: 26 MMOL/L
COLOR: YELLOW
CREAT SERPL-MCNC: 1.71 MG/DL
EGFR: 41 ML/MIN/1.73M2
EPITHELIAL CELLS: 0 /HPF
GLUCOSE QUALITATIVE U: NEGATIVE MG/DL
GLUCOSE SERPL-MCNC: 120 MG/DL
KETONES URINE: NEGATIVE MG/DL
LEUKOCYTE ESTERASE URINE: NEGATIVE
MICROSCOPIC-UA: NORMAL
NITRITE URINE: NEGATIVE
PH URINE: 6
PHOSPHATE SERPL-MCNC: 3.4 MG/DL
POTASSIUM SERPL-SCNC: 4.2 MMOL/L
PROTEIN URINE: NEGATIVE MG/DL
RED BLOOD CELLS URINE: 0 /HPF
SODIUM SERPL-SCNC: 140 MMOL/L
SPECIFIC GRAVITY URINE: 1.02
UROBILINOGEN URINE: 0.2 MG/DL
WHITE BLOOD CELLS URINE: 0 /HPF

## 2023-06-06 RX ORDER — RIVAROXABAN 10 MG/1
10 TABLET, FILM COATED ORAL DAILY
Qty: 30 | Refills: 0 | Status: DISCONTINUED | COMMUNITY
Start: 2023-06-05 | End: 2023-06-06

## 2023-06-06 NOTE — HISTORY OF PRESENT ILLNESS
[FreeTextEntry1] : A case  CKD with hypertension, hyperlipidemia, s/p aortic stent placement and repair right femoral vein. Patient has come for follow-up of CKD. Patient has been started on Xarelto 5 mg for clot in aortic aneurysm.

## 2023-06-06 NOTE — ASSESSMENT
[FreeTextEntry1] : A case  CKD with hypertension, hyperlipidemia, s/p aortic stent placement and repair right femoral vein. Patient has come for follow-up of CKD. Patient has been started on Xarelto 5 mg for clot in aortic aneurysm.\par BP is controlled. Weight is stable.\par Advised renal panel and CBC.\par Lab tests discussed with the  patient. There is mild increase in serum creatinine to 1.71 mg/dl. Urine analysis within acceptable range. RTC  3 months.

## 2023-06-14 ENCOUNTER — APPOINTMENT (OUTPATIENT)
Dept: ORTHOPEDIC SURGERY | Facility: CLINIC | Age: 78
End: 2023-06-14
Payer: MEDICARE

## 2023-06-14 PROCEDURE — 20610 DRAIN/INJ JOINT/BURSA W/O US: CPT | Mod: RT

## 2023-06-14 PROCEDURE — 99214 OFFICE O/P EST MOD 30 MIN: CPT | Mod: 25

## 2023-06-14 PROCEDURE — 73564 X-RAY EXAM KNEE 4 OR MORE: CPT | Mod: RT

## 2023-06-14 RX ORDER — HYALURONATE SODIUM, STABILIZED 88 MG/4 ML
88 SYRINGE (ML) INTRAARTICULAR
Qty: 1 | Refills: 0 | Status: ACTIVE | COMMUNITY
Start: 2023-06-14

## 2023-06-14 NOTE — DISCUSSION/SUMMARY
[de-identified] : I discussed the underlying pathophysiology of the patient's condition in great detail with the patient. I went over the patient's x-rays with them in great detail. The extent of the patient’s arthritis was discussed in great detail with them. Viscosupplementation was discussed as a solution to the patient's symptoms, and we are requesting Monovisc for the right knee. The patient elected to receive a cortisone injection into his right knee today and tolerated it well. I instructed the patient on ROM exercises and told them to take it easy. The use of ice and rest was reviewed with the patient. The patient may resume activities in a couple of days. All of their questions were answered. They understand and consent to the plan.\par \par FU in 1 month.\par After a right knee cortisone injection today (06/14/2023).\par After viscosupplementation authorization.

## 2023-06-14 NOTE — PHYSICAL EXAM
[de-identified] : Constitutional\par o Appearance : well-nourished, well developed, alert, in no acute distress \par Head and Face\par o Head :\par ¦ Inspection : atraumatic, normocephalic\par o Face :\par ¦ Inspection : no visible rash or discoloration\par Respiratory\par o Respiratory Effort: breathing unlabored \par Neurologic\par o Mental Status Examination :\par ¦ Orientation : grossly oriented to person, place and time\par Psychiatric\par o Mood and Affect: mood normal, affect appropriate \par \par Right Lower Extremity\par o Buttock : no tenderness, swelling or deformities \par o Right Hip :\par ¦ Inspection/Palpation : no tenderness, swelling or deformities\par ¦ Range of Motion : full and painless in all planes, no crepitance\par ¦ Stability : joint stability intact\par ¦ Strength : extension, flexion, adduction, abduction, internal rotation and external rotation 5/5 \par \par o Right Knee :\par ¦ Inspection/Palpation : severe lateral compartment tenderness to palpation, minimal swelling\par ¦ Range of Motion : 0-115 degrees, pain on further flexion, no crepitance\par ¦ Stability : no valgus or varus instability present on provocative testing\par ¦ Strength : flexion and extension 5/5\par ¦ Tests and Signs : Anterior Drawer negative, Lachman negative, Keegan's negative\par \par Left Lower Extremity\par o Buttock : no tenderness, swelling or deformities \par o Left Hip :\par ¦ Inspection/Palpation : no tenderness, no swelling or deformities\par ¦ Range of Motion : full and painless in all planes, no crepitance\par ¦ Stability : joint stability intact\par ¦ Strength : extension, flexion, adduction, abduction, internal rotation and external rotation 5/5\par \par o Left Knee :\par ¦ Inspection/Palpation : no tenderness to palpation, no swelling\par ¦ Range of Motion : active flexion and extension full and painless, no crepitance\par ¦ Stability : no valgus or varus instability present on provocative testing\par ¦ Strength : flexion and extension 5/5\par ¦ Tests and Signs : Anterior Drawer negative, Lachman negative, Keegan's negative\par \par Gait: stiff on the right, valgus attitude, no significant extremity swelling or lymphedema, no foot drop\par \par Radiology Results:\par o Right Knee: Standing AP, lateral, tunnel and skyline  views were obtained and reveal moderate lateral and patellofemoral arthritis.\par \par o Right Knee injection : Indication- knee osteoarthritis, Anatomic location- right intra-articular joint space, Spray - area was sterilized with Betadine and alcohol and anesthetized with Ethyl Chloride , needle used-20G, Medications given- 5cc's lidocaine, 0.5cc's kenalog, 0.5 cc's dexamethasone, and patient tolerated it well.

## 2023-07-05 ENCOUNTER — APPOINTMENT (OUTPATIENT)
Dept: ORTHOPEDIC SURGERY | Facility: CLINIC | Age: 78
End: 2023-07-05
Payer: MEDICARE

## 2023-07-05 PROCEDURE — 99214 OFFICE O/P EST MOD 30 MIN: CPT

## 2023-07-05 NOTE — PHYSICAL EXAM
[de-identified] : Constitutional\par o Appearance : well-nourished, well developed, alert, in no acute distress \par Head and Face\par o Head :\par ¦ Inspection : atraumatic, normocephalic\par o Face :\par ¦ Inspection : no visible rash or discoloration\par Respiratory\par o Respiratory Effort: breathing unlabored \par Neurologic\par o Mental Status Examination :\par ¦ Orientation : grossly oriented to person, place and time\par Psychiatric\par o Mood and Affect: mood normal, affect appropriate \par \par Right Lower Extremity\par o Buttock : no tenderness, swelling or deformities \par o Right Hip :\par ¦ Inspection/Palpation : no tenderness, swelling or deformities\par ¦ Range of Motion : full and painless in all planes, no crepitance\par ¦ Stability : joint stability intact\par ¦ Strength : extension, flexion, adduction, abduction, internal rotation and external rotation 5/5 \par \par o Right Knee :\par ¦ Inspection/Palpation :  lateral compartment tenderness to palpation, medial femoral compartment tenderness, no  swelling\par ¦ Range of Motion : 0-125 degrees, pain on further flexion, no crepitance, good patellofemoral glide \par ¦ Stability : no valgus or varus instability present on provocative testing\par ¦ Strength : flexion and extension 5/5\par ¦ Tests and Signs : Anterior Drawer negative, Lachman negative, Keegan's negative\par \par Left Lower Extremity\par o Buttock : no tenderness, swelling or deformities \par o Left Hip :\par ¦ Inspection/Palpation : no tenderness, no swelling or deformities\par ¦ Range of Motion : full and painless in all planes, no crepitance\par ¦ Stability : joint stability intact\par ¦ Strength : extension, flexion, adduction, abduction, internal rotation and external rotation 5/5\par \par o Left Knee :\par ¦ Inspection/Palpation : no tenderness to palpation, no swelling\par ¦ Range of Motion : active flexion and extension full and painless, no crepitance\par ¦ Stability : no valgus or varus instability present on provocative testing\par ¦ Strength : flexion and extension 5/5\par ¦ Tests and Signs : Anterior Drawer negative, Lachman negative, Keegan's negative\par \par Gait: stiff on the right, valgus attitude, no significant extremity swelling or lymphedema, no foot drop

## 2023-07-05 NOTE — DISCUSSION/SUMMARY
[de-identified] : I went over the pathophysiology of the patient's symptoms in great detail with the patient. Proper walking protocol was discussed and demonstrated with the patient. We discussed the use of ice, Tylenol and  to relieve pain. We advised him to start a course of Celebrex to be taken once a day after a meal.  He understands that taking Celebrex for a few days will not affect his bleeding as Celebrex does not cause bleeding like other nonsteroidals.  He will take it after meals.  At-home strengthening exercises were discussed and demonstrated with the patient. \par \par All of their questions were answered. They understand and consent to the plan. \par \par FU in 4 to 6 weeks.

## 2023-07-05 NOTE — HISTORY OF PRESENT ILLNESS
[de-identified] : 78 year old male presents for follow-up of right lateral knee pain. He received cortisone in the right knee on 06/14/23. The patient presents with a right knee compression brace. He notes he has pain when he walks. He has taken Tylenol to help with the pain. He is s/p bilateral THR 18-20 years ago. \par He is s/p abdominal aortic aneurysm repair x2. He is on Eliquis.  He notes the pain increased for a day but now has calmed down a bit.  He is traveling to [facility] for family matters and is concerned about his ability to travel.  We are awaiting authorization for Visco supplementation.\par \par Radiology Results from 06/14/23:\par o Right Knee: Standing AP, lateral, tunnel and skyline  views were obtained and reveal moderate lateral and patellofemoral arthritis.

## 2023-07-05 NOTE — ADDENDUM
[FreeTextEntry1] : I, ZOHAIB AUREA, acted solely as a scribe for Dr. Portillo Rincon on this date 07/05/2023.\par \par All medical record entries made by the Scribe were at my, Dr. Portillo Rincon, direction and personally dictated by me on 07/05/2023. I have reviewed the chart and agree that the record accurately reflects my personal performance of the history, physical exam, assessment and plan. I have also personally directed, reviewed, and agreed with the chart.								 \par  Estimated Blood Loss (Cc): minimal

## 2023-07-06 NOTE — H&P PST ADULT - ALLERGIC/IMMUNOLOGIC
Pt here for SP tube change. No concerns from patient, doing well.  Pt in supine position.  Present SP tube was removed, 18 F .  Pt prepped with betadine in sterile fashion.  18 F SP tube inserted without difficulty, urine output was recieved.  Urine is clear yellow. Retention balloon filled with 10 cc's water.  Catheter plugged per pt preference.   Pt tolerated procedure well.  Granulation tissue around sp sit noted. Does not bleed or bother pt.   
details…

## 2023-07-21 ENCOUNTER — RX RENEWAL (OUTPATIENT)
Age: 78
End: 2023-07-21

## 2023-07-26 ENCOUNTER — APPOINTMENT (OUTPATIENT)
Dept: ORTHOPEDIC SURGERY | Facility: CLINIC | Age: 78
End: 2023-07-26

## 2023-08-23 ENCOUNTER — RX RENEWAL (OUTPATIENT)
Age: 78
End: 2023-08-23

## 2023-08-23 RX ORDER — VALSARTAN AND HYDROCHLOROTHIAZIDE 80; 12.5 MG/1; MG/1
80-12.5 TABLET, FILM COATED ORAL DAILY
Qty: 90 | Refills: 3 | Status: ACTIVE | COMMUNITY
Start: 2020-10-05 | End: 1900-01-01

## 2023-08-24 NOTE — H&P PST ADULT - HEIGHT IN INCHES
Pt with chronic pelvic pain is requesting pain medication. Pt exhibits drug seeking behavior in triage.   See chart review
6

## 2023-08-31 ENCOUNTER — RX RENEWAL (OUTPATIENT)
Age: 78
End: 2023-08-31

## 2023-09-07 ENCOUNTER — APPOINTMENT (OUTPATIENT)
Dept: NEPHROLOGY | Facility: CLINIC | Age: 78
End: 2023-09-07
Payer: MEDICARE

## 2023-09-07 VITALS
HEART RATE: 68 BPM | OXYGEN SATURATION: 98 % | WEIGHT: 209.44 LBS | TEMPERATURE: 97.2 F | SYSTOLIC BLOOD PRESSURE: 91 MMHG | DIASTOLIC BLOOD PRESSURE: 60 MMHG | BODY MASS INDEX: 32.87 KG/M2 | HEIGHT: 67 IN

## 2023-09-07 PROCEDURE — 99213 OFFICE O/P EST LOW 20 MIN: CPT

## 2023-09-07 NOTE — ASU PREOP CHECKLIST - HEIGHT IN CM
[Stooped] : stooped [Babin's Sign] : negative Babin's sign [Pronator Drift] : negative pronator drift [SLR] : negative straight leg raise [de-identified] : 5 out of 5 motor strength, sensation is intact and symmetrical full range of motion flexion extension and rotation, no palpatory tenderness full range of motion of hips knees shoulders and elbows (all four extremities), no atrophy, negative straight leg raise, no pathological reflexes, no swelling, normal ambulation, no apparent distress skin is intact, no palpable lymph nodes, no upper or lower extremity instability, alert and oriented x3 and normal mood. Normal finger-to nose test.  [de-identified] : I reviewed, interpreted and clinically correlated the following outside imaging studies,  MR SPINE LUMBAR  - ORDERED BY: MARSHA CHAVIRA   PROCEDURE DATE:  08/31/2023    INTERPRETATION:  CLINICAL INFORMATION: Left leg pain.  MRI of the lumbar spine was performed sagittal T1-T2 and STIR sequences. Axial T2 and coronal T1-weighted sequences were performed.  Loss of the normal lumbar lordosis is seen.  Mild scoliosis seen.  Hemangioma involving the L1 and L2 vertebral bodies are suspected.  Disc desiccation is seen involving the lower thoracic and lumbar spine region. These findings most prominent at the T11-12, T12-L1 and L4-5 levels and is secondary to chronic degenerative change  Grade 1 anterolisthesis is seen involving L4 on L5 which is likely the result of chronic degenerative changes. Please note possibility of underlying lysis defects are suspected bilateral. This finding can be confirmed with plain film. Large Schmorl's node is seen involving the inferior endplate of T12 which is secondary to chronic degenerative change changes.  T12-L1: Disc bulge and bilateral hypertrophic facet joint changes seen. Mild narrowing of the spinal canal. Moderate to severe narrowing of the right neural foramen.  L1-2: Disc bulge and bilateral hypertrophic facet joint changes. Mild narrowing of the spinal canal.  L2-3: Bilateral hypertrophic facet joint changes seen. Mild to moderate narrowing of the spinal canal.  L2-3: Disc bulge and bilateral hypertrophic facet joint changes seen. Mild narrowing of the spinal canal.  L4-5: Disc bulge and bilateral hypertrophic facet joint changes. Hypertrophic changes involve the ligamentum flavum. Severe narrowing of spinal canal and severe compression of thecal sac. Moderate to severe narrowing of the right neural foramen and severe narrowing of the left neural foramen  L5-S1: Disc bulge and bilateral hypertrophic facet joint changes seen. No significant, into the spinal canal.  The conus ends at L1 and appears normal.  Evaluation paraspinal soft tissues appear normal.  Both SI joints appear intact.  IMPRESSION: Degenerative changes as described above.  --- End of Report ---     MRI Lumbar Spine 12/2021  Impression:  -3-4mm of Grade I Spondylolisthesis of L4 on L5 -Disc herniation at T12-L1 with effacement of the ventral aspect of the thecal sac. -Degenerative changes and osteoarthritis of the spine. -L2-3 Disc bulge with effacement of the ventral aspect of the thecal sac. Bilateral neural foraminal narrowing. No central canal stenosis. -L3-4 Disc bulge with effacement of the ventral aspect of the thecal sac. Bilateral neural foraminal narrowing. No central canal stenosis. -L4-5 Central disc herniation right paracentral with effacement of the ventral aspect of the thecal sac. Bilateral neural foraminal narrowing. Mild to moderate central canal stenosis.    190.5

## 2023-09-08 LAB
ALBUMIN SERPL ELPH-MCNC: 4.4 G/DL
ANION GAP SERPL CALC-SCNC: 13 MMOL/L
APPEARANCE: CLEAR
BACTERIA: NEGATIVE /HPF
BILIRUBIN URINE: NEGATIVE
BLOOD URINE: NEGATIVE
BUN SERPL-MCNC: 30 MG/DL
CALCIUM SERPL-MCNC: 9.4 MG/DL
CAST: 37 /LPF
CHLORIDE SERPL-SCNC: 102 MMOL/L
CO2 SERPL-SCNC: 23 MMOL/L
COLOR: NORMAL
CREAT SERPL-MCNC: 2.61 MG/DL
CREAT SPEC-SCNC: 328 MG/DL
CREAT/PROT UR: 0.1 RATIO
EGFR: 25 ML/MIN/1.73M2
EPITHELIAL CELLS: 2 /HPF
ESTIMATED AVERAGE GLUCOSE: 120 MG/DL
GLUCOSE QUALITATIVE U: NEGATIVE MG/DL
GLUCOSE SERPL-MCNC: 90 MG/DL
HBA1C MFR BLD HPLC: 5.8 %
HCT VFR BLD CALC: 40.3 %
HGB BLD-MCNC: 13.4 G/DL
HYALINE CASTS: PRESENT
KETONES URINE: ABNORMAL MG/DL
LEUKOCYTE ESTERASE URINE: NEGATIVE
MCHC RBC-ENTMCNC: 31.5 PG
MCHC RBC-ENTMCNC: 33.3 GM/DL
MCV RBC AUTO: 94.6 FL
MICROSCOPIC-UA: NORMAL
NITRITE URINE: NEGATIVE
PH URINE: 5.5
PHOSPHATE SERPL-MCNC: 4.1 MG/DL
PLATELET # BLD AUTO: 149 K/UL
POTASSIUM SERPL-SCNC: 4.9 MMOL/L
PROT UR-MCNC: 36 MG/DL
PROTEIN URINE: 30 MG/DL
RBC # BLD: 4.26 M/UL
RBC # FLD: 14.3 %
RED BLOOD CELLS URINE: 2 /HPF
REVIEW: NORMAL
SODIUM SERPL-SCNC: 138 MMOL/L
SPECIFIC GRAVITY URINE: 1.02
TSH SERPL-ACNC: 2.06 UIU/ML
UROBILINOGEN URINE: 1 MG/DL
WBC # FLD AUTO: 8.52 K/UL
WHITE BLOOD CELLS URINE: 2 /HPF

## 2023-09-08 NOTE — PHYSICAL EXAM
[General Appearance - Alert] : alert [General Appearance - In No Acute Distress] : in no acute distress [General Appearance - Well Nourished] : well nourished [Sclera] : the sclera and conjunctiva were normal [PERRL With Normal Accommodation] : pupils were equal in size, round, and reactive to light [Outer Ear] : the ears and nose were normal in appearance [Both Tympanic Membranes Were Examined] : both tympanic membranes were normal [Neck Appearance] : the appearance of the neck was normal [] : no respiratory distress [Respiration, Rhythm And Depth] : normal respiratory rhythm and effort [Exaggerated Use Of Accessory Muscles For Inspiration] : no accessory muscle use [Apical Impulse] : the apical impulse was normal [Heart Rate And Rhythm] : heart rate was normal and rhythm regular [Heart Sounds] : normal S1 and S2 [Pitting Edema] : pitting edema present [___ +] : bilateral [unfilled]+ pitting edema to the ankles [Bowel Sounds] : normal bowel sounds [Abdomen Soft] : soft [Abdomen Tenderness] : non-tender [Cervical Lymph Nodes Enlarged Posterior Bilaterally] : posterior cervical [Cervical Lymph Nodes Enlarged Anterior Bilaterally] : anterior cervical [Supraclavicular Lymph Nodes Enlarged Bilaterally] : supraclavicular [No CVA Tenderness] : no ~M costovertebral angle tenderness [No Spinal Tenderness] : no spinal tenderness [Abnormal Walk] : normal gait [Musculoskeletal - Swelling] : no joint swelling seen [Skin Color & Pigmentation] : normal skin color and pigmentation [Oriented To Time, Place, And Person] : oriented to person, place, and time [FreeTextEntry1] : Backache

## 2023-09-08 NOTE — HISTORY OF PRESENT ILLNESS
[FreeTextEntry1] : A case of CKD with hypertension, hyperlipidemia, s/p aortic stent placement and repair right femoral vein. Patient has been started on Xarelto 5 mg for clot in aortic aneurysm. Patient has come for follow-up of CKD. Patient was away to [place] for one week and developed swelling of both feet which disappeared after returning to [place].

## 2023-09-08 NOTE — ASSESSMENT
[FreeTextEntry1] : A case of CKD with hypertension, hyperlipidemia, s/p aortic stent placement, and repair of right femoral vein. The patient has been started on Xarelto 5 mg for a clot in the aortic aneurysm. The patient has come for a follow-up of CKD. The patient was away in [place] for one week and developed swelling in both feet, which disappeared after returning to the [facility]. BP is on the lower side. The patient is not taking metoprolol. Weight is stable. 1+ pedal edema. Advised renal panel, TSH, and CBC. Lab tests were discussed with the patient's wife. BP is still Low. Serum creatinine has gone up to 2.6 mg/dl with low GFR to 25 ml, probably because of low BP. Advised to hold BP meds and report the BP data on Monday.
No

## 2023-09-14 ENCOUNTER — APPOINTMENT (OUTPATIENT)
Dept: NEPHROLOGY | Facility: CLINIC | Age: 78
End: 2023-09-14
Payer: MEDICARE

## 2023-09-14 VITALS
TEMPERATURE: 97.7 F | HEIGHT: 67 IN | DIASTOLIC BLOOD PRESSURE: 86 MMHG | BODY MASS INDEX: 32.02 KG/M2 | SYSTOLIC BLOOD PRESSURE: 133 MMHG | HEART RATE: 65 BPM | WEIGHT: 204 LBS | OXYGEN SATURATION: 95 %

## 2023-09-14 PROCEDURE — 99213 OFFICE O/P EST LOW 20 MIN: CPT

## 2023-09-14 RX ORDER — DOXAZOSIN 8 MG/1
8 TABLET ORAL DAILY
Qty: 90 | Refills: 3 | Status: DISCONTINUED | COMMUNITY
Start: 2020-10-05 | End: 2023-09-14

## 2023-09-14 RX ORDER — METOPROLOL SUCCINATE 50 MG/1
50 TABLET, EXTENDED RELEASE ORAL
Qty: 90 | Refills: 0 | Status: DISCONTINUED | COMMUNITY
Start: 2020-10-05 | End: 2023-09-14

## 2023-09-15 LAB
ALBUMIN SERPL ELPH-MCNC: 4.1 G/DL
ANION GAP SERPL CALC-SCNC: 14 MMOL/L
APPEARANCE: CLEAR
BACTERIA: NEGATIVE /HPF
BILIRUBIN URINE: NEGATIVE
BLOOD URINE: NEGATIVE
BUN SERPL-MCNC: 31 MG/DL
CALCIUM SERPL-MCNC: 9.3 MG/DL
CAST: 0 /LPF
CHLORIDE SERPL-SCNC: 105 MMOL/L
CO2 SERPL-SCNC: 22 MMOL/L
COLOR: YELLOW
CREAT SERPL-MCNC: 1.61 MG/DL
EGFR: 44 ML/MIN/1.73M2
EPITHELIAL CELLS: 1 /HPF
GLUCOSE QUALITATIVE U: NEGATIVE MG/DL
GLUCOSE SERPL-MCNC: 79 MG/DL
KETONES URINE: NEGATIVE MG/DL
LEUKOCYTE ESTERASE URINE: NEGATIVE
MICROSCOPIC-UA: NORMAL
NITRITE URINE: NEGATIVE
PH URINE: 5.5
PHOSPHATE SERPL-MCNC: 3.4 MG/DL
POTASSIUM SERPL-SCNC: 4.8 MMOL/L
PROTEIN URINE: NEGATIVE MG/DL
RED BLOOD CELLS URINE: 0 /HPF
SODIUM SERPL-SCNC: 140 MMOL/L
SPECIFIC GRAVITY URINE: 1.02
UROBILINOGEN URINE: 0.2 MG/DL
WHITE BLOOD CELLS URINE: 0 /HPF

## 2023-11-15 ENCOUNTER — APPOINTMENT (OUTPATIENT)
Dept: INTERNAL MEDICINE | Facility: CLINIC | Age: 78
End: 2023-11-15
Payer: MEDICARE

## 2023-11-15 VITALS
TEMPERATURE: 97.8 F | BODY MASS INDEX: 31.23 KG/M2 | WEIGHT: 199 LBS | DIASTOLIC BLOOD PRESSURE: 80 MMHG | HEIGHT: 67 IN | HEART RATE: 78 BPM | OXYGEN SATURATION: 99 % | SYSTOLIC BLOOD PRESSURE: 130 MMHG

## 2023-11-15 DIAGNOSIS — Z00.00 ENCOUNTER FOR GENERAL ADULT MEDICAL EXAMINATION W/OUT ABNORMAL FINDINGS: ICD-10-CM

## 2023-11-15 DIAGNOSIS — R73.09 OTHER ABNORMAL GLUCOSE: ICD-10-CM

## 2023-11-15 PROCEDURE — 36415 COLL VENOUS BLD VENIPUNCTURE: CPT

## 2023-11-15 PROCEDURE — G0439: CPT

## 2023-11-15 PROCEDURE — 82270 OCCULT BLOOD FECES: CPT

## 2023-11-15 RX ORDER — CELECOXIB 200 MG/1
200 CAPSULE ORAL
Qty: 30 | Refills: 3 | Status: COMPLETED | COMMUNITY
Start: 2023-07-05 | End: 2023-11-15

## 2023-11-15 RX ORDER — KETOCONAZOLE 20 MG/G
2 CREAM TOPICAL
Qty: 1 | Refills: 1 | Status: COMPLETED | COMMUNITY
Start: 2022-11-08 | End: 2023-11-15

## 2023-11-15 RX ORDER — APIXABAN 5 MG/1
5 TABLET, FILM COATED ORAL TWICE DAILY
Refills: 0 | Status: ACTIVE | COMMUNITY

## 2023-11-30 NOTE — H&P PST ADULT - MS GEN HX ROS MEA POS PC
Patient outreach - lm on identified voicemail stating pt is due for office visit and fasting labs arthralgia/arthritis/joint swelling/joint pain

## 2023-12-07 NOTE — DIETITIAN INITIAL EVALUATION ADULT. - SIGNS/SYMPTOMS
Pending cardilogy graeme, pt declined sleep study in the past   NPO day 2; NGT in place/intubated status

## 2023-12-11 ENCOUNTER — RX RENEWAL (OUTPATIENT)
Age: 78
End: 2023-12-11

## 2023-12-11 ENCOUNTER — APPOINTMENT (OUTPATIENT)
Dept: NEPHROLOGY | Facility: CLINIC | Age: 78
End: 2023-12-11
Payer: MEDICARE

## 2023-12-11 VITALS — SYSTOLIC BLOOD PRESSURE: 147 MMHG | DIASTOLIC BLOOD PRESSURE: 92 MMHG

## 2023-12-11 VITALS
OXYGEN SATURATION: 96 % | HEART RATE: 67 BPM | SYSTOLIC BLOOD PRESSURE: 160 MMHG | DIASTOLIC BLOOD PRESSURE: 95 MMHG | TEMPERATURE: 97.8 F | BODY MASS INDEX: 33.27 KG/M2 | HEIGHT: 67 IN | WEIGHT: 212 LBS

## 2023-12-11 DIAGNOSIS — E78.5 HYPERLIPIDEMIA, UNSPECIFIED: ICD-10-CM

## 2023-12-11 DIAGNOSIS — I10 ESSENTIAL (PRIMARY) HYPERTENSION: ICD-10-CM

## 2023-12-11 DIAGNOSIS — D64.9 ANEMIA, UNSPECIFIED: ICD-10-CM

## 2023-12-11 DIAGNOSIS — N18.32 CHRONIC KIDNEY DISEASE, STAGE 3B: ICD-10-CM

## 2023-12-11 DIAGNOSIS — N26.1 ATROPHY OF KIDNEY (TERMINAL): ICD-10-CM

## 2023-12-11 PROCEDURE — 99213 OFFICE O/P EST LOW 20 MIN: CPT

## 2023-12-11 RX ORDER — DOXAZOSIN 8 MG/1
8 TABLET ORAL DAILY
Qty: 90 | Refills: 3 | Status: ACTIVE | COMMUNITY
Start: 2023-10-11 | End: 1900-01-01

## 2023-12-12 LAB
ALBUMIN SERPL ELPH-MCNC: 4 G/DL
ANION GAP SERPL CALC-SCNC: 10 MMOL/L
APPEARANCE: CLEAR
BACTERIA: NEGATIVE /HPF
BILIRUBIN URINE: NEGATIVE
BLOOD URINE: NEGATIVE
BUN SERPL-MCNC: 31 MG/DL
CALCIUM SERPL-MCNC: 9.1 MG/DL
CAST: 0 /LPF
CHLORIDE SERPL-SCNC: 104 MMOL/L
CHOLEST SERPL-MCNC: 171 MG/DL
CO2 SERPL-SCNC: 23 MMOL/L
COLOR: YELLOW
CREAT SERPL-MCNC: 1.47 MG/DL
EGFR: 49 ML/MIN/1.73M2
EPITHELIAL CELLS: 0 /HPF
GLUCOSE QUALITATIVE U: NEGATIVE MG/DL
GLUCOSE SERPL-MCNC: 99 MG/DL
HCT VFR BLD CALC: 41.8 %
HDLC SERPL-MCNC: 40 MG/DL
HGB BLD-MCNC: 13.6 G/DL
KETONES URINE: NEGATIVE MG/DL
LDLC SERPL CALC-MCNC: 101 MG/DL
LEUKOCYTE ESTERASE URINE: NEGATIVE
MCHC RBC-ENTMCNC: 31.6 PG
MCHC RBC-ENTMCNC: 32.5 GM/DL
MCV RBC AUTO: 97 FL
MICROSCOPIC-UA: NORMAL
NITRITE URINE: NEGATIVE
NONHDLC SERPL-MCNC: 131 MG/DL
PH URINE: 5.5
PHOSPHATE SERPL-MCNC: 2.6 MG/DL
PLATELET # BLD AUTO: 165 K/UL
POTASSIUM SERPL-SCNC: 4.7 MMOL/L
PROTEIN URINE: NEGATIVE MG/DL
RBC # BLD: 4.31 M/UL
RBC # FLD: 14.3 %
RED BLOOD CELLS URINE: 0 /HPF
SODIUM SERPL-SCNC: 138 MMOL/L
SPECIFIC GRAVITY URINE: 1.02
TRIGL SERPL-MCNC: 173 MG/DL
UROBILINOGEN URINE: 0.2 MG/DL
WBC # FLD AUTO: 6.46 K/UL
WHITE BLOOD CELLS URINE: 0 /HPF

## 2023-12-15 ENCOUNTER — APPOINTMENT (OUTPATIENT)
Dept: INTERNAL MEDICINE | Facility: CLINIC | Age: 78
End: 2023-12-15
Payer: MEDICARE

## 2023-12-15 ENCOUNTER — APPOINTMENT (OUTPATIENT)
Dept: INTERNAL MEDICINE | Facility: CLINIC | Age: 78
End: 2023-12-15

## 2023-12-15 VITALS
HEART RATE: 69 BPM | HEIGHT: 67 IN | WEIGHT: 204 LBS | OXYGEN SATURATION: 96 % | DIASTOLIC BLOOD PRESSURE: 80 MMHG | TEMPERATURE: 96.4 F | BODY MASS INDEX: 32.02 KG/M2 | SYSTOLIC BLOOD PRESSURE: 130 MMHG

## 2023-12-15 LAB
25(OH)D3 SERPL-MCNC: 30 NG/ML
ALBUMIN SERPL ELPH-MCNC: 4.5 G/DL
ALP BLD-CCNC: 75 U/L
ALT SERPL-CCNC: 23 U/L
ANION GAP SERPL CALC-SCNC: 16 MMOL/L
AST SERPL-CCNC: 18 U/L
BILIRUB SERPL-MCNC: 0.6 MG/DL
BUN SERPL-MCNC: 26 MG/DL
CALCIUM SERPL-MCNC: 9.5 MG/DL
CHLORIDE SERPL-SCNC: 103 MMOL/L
CHOLEST SERPL-MCNC: 171 MG/DL
CO2 SERPL-SCNC: 23 MMOL/L
CREAT SERPL-MCNC: 1.54 MG/DL
EGFR: 46 ML/MIN/1.73M2
GLUCOSE SERPL-MCNC: 101 MG/DL
HCT VFR BLD CALC: 44.5 %
HDLC SERPL-MCNC: 38 MG/DL
HGB BLD-MCNC: 14.4 G/DL
LDLC SERPL CALC-MCNC: 111 MG/DL
MCHC RBC-ENTMCNC: 30.9 PG
MCHC RBC-ENTMCNC: 32.4 GM/DL
MCV RBC AUTO: 95.5 FL
NONHDLC SERPL-MCNC: 133 MG/DL
PLATELET # BLD AUTO: 182 K/UL
POTASSIUM SERPL-SCNC: 4.6 MMOL/L
PROT SERPL-MCNC: 7.6 G/DL
RBC # BLD: 4.66 M/UL
RBC # FLD: 13.8 %
SODIUM SERPL-SCNC: 141 MMOL/L
TRIGL SERPL-MCNC: 124 MG/DL
VIT B12 SERPL-MCNC: 509 PG/ML
WBC # FLD AUTO: 6.61 K/UL

## 2023-12-15 PROCEDURE — 90662 IIV NO PRSV INCREASED AG IM: CPT

## 2023-12-15 PROCEDURE — G0008: CPT

## 2024-04-02 ENCOUNTER — APPOINTMENT (OUTPATIENT)
Dept: ORTHOPEDIC SURGERY | Facility: CLINIC | Age: 79
End: 2024-04-02
Payer: MEDICARE

## 2024-04-02 DIAGNOSIS — M16.0 BILATERAL PRIMARY OSTEOARTHRITIS OF HIP: ICD-10-CM

## 2024-04-02 PROCEDURE — 99214 OFFICE O/P EST MOD 30 MIN: CPT | Mod: 25

## 2024-04-02 PROCEDURE — 20610 DRAIN/INJ JOINT/BURSA W/O US: CPT | Mod: RT

## 2024-04-02 PROCEDURE — 73564 X-RAY EXAM KNEE 4 OR MORE: CPT | Mod: RT

## 2024-04-02 RX ORDER — HYALURONATE SODIUM, STABILIZED 88 MG/4 ML
88 SYRINGE (ML) INTRAARTICULAR
Qty: 1 | Refills: 0 | Status: ACTIVE | COMMUNITY
Start: 2024-04-02

## 2024-04-02 NOTE — ADDENDUM
[FreeTextEntry1] : I, ZOHAIB VILLAR, acted solely as a scribe for Dr. Portillo Rincon on this date 04/02/2024.  All medical record entries made by the Scribe were at my, Dr. Portillo Rincon, direction and personally dictated by me on 04/02/2024. I have reviewed the chart and agree that the record accurately reflects my personal performance of the history, physical exam, assessment and plan. I have also personally directed, reviewed, and agreed with the chart.

## 2024-04-02 NOTE — PHYSICAL EXAM
[de-identified] : Constitutional o Appearance : well-nourished, well developed, alert, in no acute distress  Head and Face o Head :  Inspection : atraumatic, normocephalic o Face :  Inspection : no visible rash or discoloration Respiratory o Respiratory Effort: breathing unlabored  Neurologic o Mental Status Examination :  Orientation : grossly oriented to person, place and time Psychiatric o Mood and Affect: mood normal, affect appropriate   Right Lower Extremity o Buttock : no tenderness, swelling or deformities  o Right Hip :  Inspection/Palpation : no tenderness, swelling or deformities  Range of Motion : full and limited rotation, no crepitance  Stability : joint stability intact  Strength : extension, flexion, adduction, abduction, internal rotation and external rotation 5/5   o Right Knee :  Inspection/Palpation : medial compartment tenderness and lateral compartment tenderness to palpation, lateral facet tenderness , minimal swelling, mild warmth, mild valgus deformity   Range of Motion : 0-125 degrees, pain on further flexion, mild crepitance, good patellofemoral glide   Stability : no valgus or varus instability present on provocative testing  Strength : flexion and extension 5/5  Tests and Signs : Anterior Drawer negative, Lachman negative, Keegan's negative  Left Lower Extremity o Buttock : no tenderness, swelling or deformities  o Left Hip :  Inspection/Palpation : no tenderness, no swelling or deformities  Range of Motion : full and painless in all planes, no crepitance  Stability : joint stability intact  Strength : extension, flexion, adduction, abduction, internal rotation and external rotation 5/5  o Left Knee :  Inspection/Palpation : no tenderness to palpation, no swelling  Range of Motion : active flexion and extension full and painless, no crepitance  Stability : no valgus or varus instability present on provocative testing  Strength : flexion and extension 5/5  Tests and Signs : Anterior Drawer negative, Lachman negative, Keegan's negative  Gait: stiff on the right, valgus attitude, no foot drop, no significant extremity swelling or lymphedema, no foot drop  Radiology Results 4/2/2024  o Right Knee : Standing AP, lateral, tunnel and skyline views of the knee were obtained and revealed moderate tricompartmental osteoarthritis worse in the lateral and patellofemoral compartments unchanged from his previous X-rays in June   o Knee injection : Indication- right knee osteoarthritis, Anatomic location- right intra-articular joint space, Spray - area was sterilized with Betadine and alcohol and anesthetized with Ethyl Chloride , needle used-20G, Medications given- 5cc's lidocaine, 0.5cc's kenalog, 0.5 cc's dexamethasone. The patient tolerated the procedure well.

## 2024-04-02 NOTE — DISCUSSION/SUMMARY
[de-identified] : I went over the pathophysiology of the patient's symptoms in great detail with the patient. I discussed the underlying pathophysiology of the patient's condition in great detail with the patient. I went over the patient's x-rays with them in great detail. I stressed the importance of weight loss and its benefits to the patients joints and overall health. He needs to avoid high-impact activities such as running and jumping or riding a treadmill. I recommend alternative activities such as riding a stationary bike or elliptical on low tension. He should focus on light weight and high repetition exercises. He should avoid squatting and kneeling. The patient elected to receive a cortisone injection into his right knee today, and tolerated it well. I instructed the patient on ROM exercises, and told them to take it easy. The use of ice and rest was reviewed with the patient. The patient may resume activities tomorrow. Viscosupplementation was discussed as a solution to the patient's symptoms, and we are requesting Monovisc or Euflexxa for the right knee.   All of their questions were answered. They understand and consent to the plan.   FU after authorization

## 2024-04-02 NOTE — HISTORY OF PRESENT ILLNESS
[de-identified] : 78 year old male presents for follow-up of right lateral knee pain. He states he was in [place] for 3 months and was doing really well. He states his right knee buckled the other day. He has difficulty standing up from a seated position. He states he does not have pain while walking short or long distances. He states he has right knee swelling. He is also complaining of right knee crepitance. He received cortisone in the right knee on 06/14/23. He is s/p bilateral THR 18-20 years ago. He states his hips are doing well.  He is s/p abdominal aortic aneurysm repair x2. He is on Eliquis.    Radiology Results from 06/14/23: o Right Knee: Standing AP, lateral, tunnel and skyline  views were obtained and reveal moderate lateral and patellofemoral arthritis.

## 2024-04-23 NOTE — PHYSICAL THERAPY INITIAL EVALUATION ADULT - ASSISTIVE DEVICE FOR STAIR TRANSFER, REHAB EVAL
which could be associated with anemia.  The scope was straightened and removed. The patient tolerated the procedure well with no immediate complications.    IMPRESSION:  Normal esophagogastroduodenoscopy with no recurrent duodenal carcinoid.  There is perhaps a faint gastric atrophy.  There is no gastrointestinal bleeding.    ESTIMATED BLOOD LOSS:  Zero.    COLONOSCOPY REPORT:  The patient was then turned.  Digital rectal exam reveals mild external hemorrhoids.  The colonoscope was passed without difficulty under direct visualization to the cecum confirmed by appendiceal orifice and ileocecal valve.  The quality of the prep was good.  Upon slow withdrawal of the scope, the mucosa of the cecum was normal.  In the ascending colon, there was a 3 mm sessile polyp removed by cold snare polypectomy.  There was a 10 mm sessile polyp removed by cold snare polypectomy and there was a 5 mm sessile polyp removed by cold snare polypectomy.  The transverse colon was normal.  There was mild diverticulosis of the left colon.  The sigmoid was otherwise normal.  Scope was withdrawn to the rectum.  The rectum was normal.  Retroflexed view of the rectum was normal.  Scope was straightened and removed.  The patient tolerated procedure well with no immediate complication.    IMPRESSION:    1. Three ascending colon polyps removed by cold snare polypectomy as detailed above.  2. Mild diverticulosis.  3. Mild external hemorrhoids.    ESTIMATED BLOOD LOSS:  Zero.    RECOMMENDATIONS:    1. Check patient portal in 14 days for biopsy results.  2. No future scheduled colonoscopy due to age.  3. The patient is to call the office to arrange a capsule endoscopy to assess for arteriovenous malformations that may be causing her anemia.          JUDAH CHANDLER MD      D:  04/23/2024 11:19:41     T:  04/23/2024 13:48:32     YANNI/JOSSUE  Job #:  [id number]     Doc#:  [id number]    CC:   OLGA Rosa MD  
straight cane

## 2024-05-21 ENCOUNTER — APPOINTMENT (OUTPATIENT)
Dept: ORTHOPEDIC SURGERY | Facility: CLINIC | Age: 79
End: 2024-05-21
Payer: MEDICARE

## 2024-05-21 VITALS — BODY MASS INDEX: 32.78 KG/M2 | WEIGHT: 204 LBS | HEIGHT: 66 IN

## 2024-05-21 PROCEDURE — 20610 DRAIN/INJ JOINT/BURSA W/O US: CPT | Mod: RT

## 2024-05-21 NOTE — DISCUSSION/SUMMARY
[de-identified] : I went over the pathophysiology of the patient's symptoms in great detail with the patient. The patient elected to receive a Euflexxa injection into his right knee today, and tolerated it well. I instructed the patient on ROM exercises, and told them to take it easy. The use of ice and rest was reviewed with the patient. The patient may resume activities tomorrow. I reminded the patient that it takes 4 to 6 weeks after the final injection to feel symptom relief.   All of their questions were answered. They understand and consent to the plan.   FU next week for 2/3 Euflexxa injection for the right knee.

## 2024-05-21 NOTE — HISTORY OF PRESENT ILLNESS
[de-identified] : 78 year old male presents for 1/3 Euflexxa injection today 5/21/2024. He received cortisone in the right knee on 04/2/24 and notes he is 80% better at this time. He is s/p bilateral THR 18-20 years ago. He states his hips are doing well. He notes he is going to [place] in July for 3 weeks.  He is s/p abdominal aortic aneurysm repair x2. He is on Eliquis.    Radiology Results 4/2/2024  o Right Knee : Standing AP, lateral, tunnel and skyline views of the knee were obtained and revealed moderate tricompartmental osteoarthritis worse in the lateral and patellofemoral compartments unchanged from his previous X-rays in June   Radiology Results from 06/14/23: o Right Knee: Standing AP, lateral, tunnel and skyline  views were obtained and reveal moderate lateral and patellofemoral arthritis.

## 2024-05-21 NOTE — ADDENDUM
[FreeTextEntry1] : I, ZOHAIB VILLAR, acted solely as a scribe for Dr. Portillo Rincon on this date 05/21/2024.  All medical record entries made by the Scribe were at my, Dr. Portillo Rincon, direction and personally dictated by me on 05/21/2024. I have reviewed the chart and agree that the record accurately reflects my personal performance of the history, physical exam, assessment and plan. I have also personally directed, reviewed, and agreed with the chart.

## 2024-05-21 NOTE — PHYSICAL EXAM
[de-identified] : Constitutional o Appearance : well-nourished, well developed, alert, in no acute distress  Head and Face o Head :  Inspection : atraumatic, normocephalic o Face :  Inspection : no visible rash or discoloration Respiratory o Respiratory Effort: breathing unlabored  Neurologic o Mental Status Examination :  Orientation : grossly oriented to person, place and time Psychiatric o Mood and Affect: mood normal, affect appropriate   Right Lower Extremity o Buttock : no tenderness, swelling or deformities  o Right Hip :  Inspection/Palpation : no tenderness, swelling or deformities  Range of Motion : full and limited rotation, no crepitance  Stability : joint stability intact  Strength : extension, flexion, adduction, abduction, internal rotation and external rotation 5/5   o Right Knee :  Inspection/Palpation : medial compartment tenderness and lateral compartment tenderness to palpation, lateral facet tenderness , no swelling, mild warmth, mild valgus deformity   Range of Motion : 0-120 degrees, pain on further flexion, mild crepitance, good patellofemoral glide   Stability : no valgus or varus instability present on provocative testing  Strength : flexion and extension 5/5  Tests and Signs : Anterior Drawer negative, Lachman negative, Keegan's negative  Left Lower Extremity o Buttock : no tenderness, swelling or deformities  o Left Hip :  Inspection/Palpation : no tenderness, no swelling or deformities  Range of Motion : full and painless in all planes, no crepitance  Stability : joint stability intact  Strength : extension, flexion, adduction, abduction, internal rotation and external rotation 5/5  o Left Knee :  Inspection/Palpation : no tenderness to palpation, no swelling  Range of Motion : active flexion and extension full and painless, no crepitance  Stability : no valgus or varus instability present on provocative testing  Strength : flexion and extension 5/5  Tests and Signs : Anterior Drawer negative, Lachman negative, Keegan's negative  Gait: stiff on the right, valgus attitude, no foot drop, no significant extremity swelling or lymphedema, no foot drop  o Knee injection : Indication-right knee osteoarthritis, Anatomic location- right intra-articular joint space, Spray - area was sterilized with Betadine and alcohol and anesthetized with Ethyl Chloride , needle used-20G, Medications given- 2cc's Euflexxa (1/3). The patient tolerated the procedure well.

## 2024-05-28 ENCOUNTER — APPOINTMENT (OUTPATIENT)
Dept: ORTHOPEDIC SURGERY | Facility: CLINIC | Age: 79
End: 2024-05-28
Payer: MEDICARE

## 2024-05-28 VITALS — HEIGHT: 66 IN | WEIGHT: 204 LBS | BODY MASS INDEX: 32.78 KG/M2

## 2024-05-28 PROCEDURE — 20610 DRAIN/INJ JOINT/BURSA W/O US: CPT | Mod: RT

## 2024-05-28 NOTE — DISCUSSION/SUMMARY
[de-identified] : I went over the pathophysiology of the patient's symptoms in great detail with the patient. The patient elected to receive a Euflexxa injection into his right knee today, and tolerated it well. I instructed the patient on ROM exercises, and told them to take it easy. The use of ice and rest was reviewed with the patient. The patient may resume activities tomorrow. I reminded the patient that it takes 4 to 6 weeks after the final injection to feel symptom relief.   All of their questions were answered. They understand and consent to the plan.   FU next week for 3/3 Euflexxa injection for the right knee.

## 2024-05-28 NOTE — HISTORY OF PRESENT ILLNESS
[de-identified] : 78 year old male presents for 2/3 Euflexxa injection today 5/28/2024. He received cortisone in the right knee on 04/2/24 and notes he is 80% better at this time. He is s/p bilateral THR 18-20 years ago. He states his hips are doing well. He notes he is going to [place] in July for 3 weeks.  He is s/p abdominal aortic aneurysm repair x2. He is on Eliquis.    Radiology Results 4/2/2024  o Right Knee : Standing AP, lateral, tunnel and skyline views of the knee were obtained and revealed moderate tricompartmental osteoarthritis worse in the lateral and patellofemoral compartments unchanged from his previous X-rays in June   Radiology Results from 06/14/23: o Right Knee: Standing AP, lateral, tunnel and skyline  views were obtained and reveal moderate lateral and patellofemoral arthritis.

## 2024-05-28 NOTE — ADDENDUM
Addended by: ANA WISE on: 7/17/2023 03:13 PM     Modules accepted: Orders     [FreeTextEntry1] : I, ZOHAIB VILLAR, acted solely as a scribe for Dr. Portillo Rincon on this date 05/28/2024.  All medical record entries made by the Scribe were at my, Dr. Portillo Rincon, direction and personally dictated by me on 05/28/2024. I have reviewed the chart and agree that the record accurately reflects my personal performance of the history, physical exam, assessment and plan. I have also personally directed, reviewed, and agreed with the chart.

## 2024-05-28 NOTE — PHYSICAL EXAM
[de-identified] : Constitutional o Appearance : well-nourished, well developed, alert, in no acute distress  Head and Face o Head :  Inspection : atraumatic, normocephalic o Face :  Inspection : no visible rash or discoloration Respiratory o Respiratory Effort: breathing unlabored  Neurologic o Mental Status Examination :  Orientation : grossly oriented to person, place and time Psychiatric o Mood and Affect: mood normal, affect appropriate   Right Lower Extremity o Buttock : no tenderness, swelling or deformities  o Right Hip :  Inspection/Palpation : no tenderness, swelling or deformities  Range of Motion : full and limited rotation, no crepitance  Stability : joint stability intact  Strength : extension, flexion, adduction, abduction, internal rotation and external rotation 5/5   o Right Knee :  Inspection/Palpation : medial compartment tenderness and lateral compartment tenderness to palpation, lateral facet tenderness , no swelling, mild warmth, mild valgus deformity   Range of Motion : 0-120 degrees, pain on further flexion, mild crepitance, good patellofemoral glide   Stability : no valgus or varus instability present on provocative testing  Strength : flexion and extension 5/5  Tests and Signs : Anterior Drawer negative, Lachman negative, Keegan's negative  Left Lower Extremity o Buttock : no tenderness, swelling or deformities  o Left Hip :  Inspection/Palpation : no tenderness, no swelling or deformities  Range of Motion : full and painless in all planes, no crepitance  Stability : joint stability intact  Strength : extension, flexion, adduction, abduction, internal rotation and external rotation 5/5  o Left Knee :  Inspection/Palpation : no tenderness to palpation, no swelling  Range of Motion : active flexion and extension full and painless, no crepitance  Stability : no valgus or varus instability present on provocative testing  Strength : flexion and extension 5/5  Tests and Signs : Anterior Drawer negative, Lachman negative, Keegan's negative  Gait: stiff on the right, valgus attitude, no foot drop, no significant extremity swelling or lymphedema, no foot drop  o Knee injection : Indication-right knee osteoarthritis, Anatomic location- right intra-articular joint space, Spray - area was sterilized with Betadine and alcohol and anesthetized with Ethyl Chloride , needle used-20G, Medications given- 2cc's Euflexxa (2/3). The patient tolerated the procedure well. lot#[id number] exp#2025-04-22

## 2024-06-04 ENCOUNTER — APPOINTMENT (OUTPATIENT)
Dept: ORTHOPEDIC SURGERY | Facility: CLINIC | Age: 79
End: 2024-06-04
Payer: MEDICARE

## 2024-06-04 VITALS — BODY MASS INDEX: 32.78 KG/M2 | HEIGHT: 66 IN | WEIGHT: 204 LBS

## 2024-06-04 DIAGNOSIS — M17.11 UNILATERAL PRIMARY OSTEOARTHRITIS, RIGHT KNEE: ICD-10-CM

## 2024-06-04 PROCEDURE — 20610 DRAIN/INJ JOINT/BURSA W/O US: CPT | Mod: RT

## 2024-06-04 NOTE — PHYSICAL EXAM
[de-identified] : Constitutional o Appearance : well-nourished, well developed, alert, in no acute distress  Head and Face o Head :  Inspection : atraumatic, normocephalic o Face :  Inspection : no visible rash or discoloration Respiratory o Respiratory Effort: breathing unlabored  Neurologic o Mental Status Examination :  Orientation : grossly oriented to person, place and time Psychiatric o Mood and Affect: mood normal, affect appropriate   Right Lower Extremity o Buttock : no tenderness, swelling or deformities  o Right Hip :  Inspection/Palpation : no tenderness, swelling or deformities  Range of Motion : full and limited rotation, no crepitance  Stability : joint stability intact  Strength : extension, flexion, adduction, abduction, internal rotation and external rotation 5/5   o Right Knee :  Inspection/Palpation : medial compartment tenderness and lateral compartment tenderness to palpation, lateral facet tenderness , no swelling, mild warmth, mild valgus deformity   Range of Motion : 0-120 degrees, pain on further flexion, mild crepitance, good patellofemoral glide   Stability : no valgus or varus instability present on provocative testing  Strength : flexion and extension 5/5  Tests and Signs : Anterior Drawer negative, Lachman negative, Keegan's negative  Left Lower Extremity o Buttock : no tenderness, swelling or deformities  o Left Hip :  Inspection/Palpation : no tenderness, no swelling or deformities  Range of Motion : full and painless in all planes, no crepitance  Stability : joint stability intact  Strength : extension, flexion, adduction, abduction, internal rotation and external rotation 5/5  o Left Knee :  Inspection/Palpation : no tenderness to palpation, no swelling  Range of Motion : active flexion and extension full and painless, no crepitance  Stability : no valgus or varus instability present on provocative testing  Strength : flexion and extension 5/5  Tests and Signs : Anterior Drawer negative, Lachman negative, Keegan's negative  Gait: stiff on the right, valgus attitude, no foot drop, no significant extremity swelling or lymphedema, no foot drop  o Knee injection : Indication-right knee osteoarthritis, Anatomic location- right intra-articular joint space, Spray - area was sterilized with Betadine and alcohol and anesthetized with Ethyl Chloride , needle used-20G, Medications given- 2cc's Euflexxa (3/3). The patient tolerated the procedure well. lot#[id number] exp#2025-04-22

## 2024-06-04 NOTE — HISTORY OF PRESENT ILLNESS
[de-identified] : 78 year old male presents for right knee 3/3 Euflexxa injection today 6/4/2024. He notes he is already feeling better. He received cortisone in the right knee on 04/2/24 and notes he is 80% better at this time. He is s/p bilateral THR 18-20 years ago. He states his hips are doing well. He notes he is going to [place] in July for 3 weeks.  He is s/p abdominal aortic aneurysm repair x2. He is on Eliquis.    Radiology Results 4/2/2024  o Right Knee : Standing AP, lateral, tunnel and skyline views of the knee were obtained and revealed moderate tricompartmental osteoarthritis worse in the lateral and patellofemoral compartments unchanged from his previous X-rays in June   Radiology Results from 06/14/23: o Right Knee: Standing AP, lateral, tunnel and skyline  views were obtained and reveal moderate lateral and patellofemoral arthritis.

## 2024-06-04 NOTE — ADDENDUM
[FreeTextEntry1] : I, ZOHAIB VILLAR, acted solely as a scribe for Dr. Portillo Rincon on this date 06/04/2024.  All medical record entries made by the Scribe were at my, Dr. Portillo Rincon, direction and personally dictated by me on 06/04/2024. I have reviewed the chart and agree that the record accurately reflects my personal performance of the history, physical exam, assessment and plan. I have also personally directed, reviewed, and agreed with the chart.

## 2024-06-04 NOTE — DISCUSSION/SUMMARY
[de-identified] : I went over the pathophysiology of the patient's symptoms in great detail with the patient. The patient elected to receive a Euflexxa injection into his right knee today, and tolerated it well. I instructed the patient on ROM exercises, and told them to take it easy. The use of ice and rest was reviewed with the patient. The patient may resume activities tomorrow. I reminded the patient that it takes 4 to 6 weeks after the final injection to feel symptom relief.   All of their questions were answered. They understand and consent to the plan.   FU in 6 weeks.

## 2024-07-18 ENCOUNTER — APPOINTMENT (OUTPATIENT)
Dept: ORTHOPEDIC SURGERY | Facility: CLINIC | Age: 79
End: 2024-07-18
Payer: MEDICARE

## 2024-07-18 VITALS — BODY MASS INDEX: 32.78 KG/M2 | HEIGHT: 66 IN | WEIGHT: 204 LBS

## 2024-07-18 DIAGNOSIS — M17.11 UNILATERAL PRIMARY OSTEOARTHRITIS, RIGHT KNEE: ICD-10-CM

## 2024-07-18 DIAGNOSIS — M16.0 BILATERAL PRIMARY OSTEOARTHRITIS OF HIP: ICD-10-CM

## 2024-07-18 DIAGNOSIS — M48.062 SPINAL STENOSIS, LUMBAR REGION WITH NEUROGENIC CLAUDICATION: ICD-10-CM

## 2024-07-18 DIAGNOSIS — M47.816 SPONDYLOSIS W/OUT MYELOPATHY OR RADICULOPATHY, LUMBAR REGION: ICD-10-CM

## 2024-07-18 PROCEDURE — 72100 X-RAY EXAM L-S SPINE 2/3 VWS: CPT

## 2024-07-18 PROCEDURE — 72170 X-RAY EXAM OF PELVIS: CPT

## 2024-07-18 PROCEDURE — 99214 OFFICE O/P EST MOD 30 MIN: CPT

## 2024-08-10 ENCOUNTER — APPOINTMENT (OUTPATIENT)
Dept: MRI IMAGING | Facility: CLINIC | Age: 79
End: 2024-08-10

## 2024-08-10 ENCOUNTER — OUTPATIENT (OUTPATIENT)
Dept: OUTPATIENT SERVICES | Facility: HOSPITAL | Age: 79
LOS: 1 days | End: 2024-08-10
Payer: MEDICARE

## 2024-08-10 DIAGNOSIS — M47.816 SPONDYLOSIS WITHOUT MYELOPATHY OR RADICULOPATHY, LUMBAR REGION: ICD-10-CM

## 2024-08-10 DIAGNOSIS — Z96.643 PRESENCE OF ARTIFICIAL HIP JOINT, BILATERAL: Chronic | ICD-10-CM

## 2024-08-10 DIAGNOSIS — Z98.890 OTHER SPECIFIED POSTPROCEDURAL STATES: Chronic | ICD-10-CM

## 2024-08-10 PROCEDURE — 72148 MRI LUMBAR SPINE W/O DYE: CPT | Mod: 26

## 2024-08-10 PROCEDURE — 72148 MRI LUMBAR SPINE W/O DYE: CPT

## 2024-08-19 ENCOUNTER — NON-APPOINTMENT (OUTPATIENT)
Age: 79
End: 2024-08-19

## 2024-09-09 ENCOUNTER — APPOINTMENT (OUTPATIENT)
Dept: ORTHOPEDIC SURGERY | Facility: CLINIC | Age: 79
End: 2024-09-09

## 2024-09-09 VITALS — HEIGHT: 66 IN | BODY MASS INDEX: 32.78 KG/M2 | WEIGHT: 204 LBS

## 2024-09-09 DIAGNOSIS — M76.31 ILIOTIBIAL BAND SYNDROME, RIGHT LEG: ICD-10-CM

## 2024-09-09 DIAGNOSIS — M47.816 SPONDYLOSIS W/OUT MYELOPATHY OR RADICULOPATHY, LUMBAR REGION: ICD-10-CM

## 2024-09-09 DIAGNOSIS — M48.062 SPINAL STENOSIS, LUMBAR REGION WITH NEUROGENIC CLAUDICATION: ICD-10-CM

## 2024-09-09 PROCEDURE — 99214 OFFICE O/P EST MOD 30 MIN: CPT

## 2024-09-09 RX ORDER — AMOXICILLIN 500 MG/1
500 TABLET, FILM COATED ORAL
Qty: 21 | Refills: 0 | Status: ACTIVE | COMMUNITY
Start: 2024-09-05

## 2024-09-09 RX ORDER — CHLORHEXIDINE GLUCONATE, 0.12% ORAL RINSE 1.2 MG/ML
0.12 SOLUTION DENTAL
Qty: 473 | Refills: 0 | Status: ACTIVE | COMMUNITY
Start: 2024-09-05

## 2024-09-09 NOTE — HISTORY OF PRESENT ILLNESS
[de-identified] : 79-year-old male presents for lumbar spine follow up evaluation and MRI review. He last saw Dr. Rincon on 7/1/2024. He presents today with a MRI of the lumbar spine from. for review. Since last visit, he notes continued right lateral calf pain, and  numbness and tingling of his right leg that radiates down to his feet. He reports these symptoms have now developed on the left side intermittently but has not changed since onset. Patient denies new weakness, numbness or paresthesia.  Patient denies bowel/bladder dysfunction, fevers, chills, weight loss, night pain, or night sweats. Patient is taking tylenol for pain relief with mild to moderate relief in symptoms. Of note, He is s/p bilateral THR 18-20 years ago.  He is s/p abdominal aortic aneurysm repair x3.  PMHx CKD with hypertension, hyperlipidemia, s/p aortic stent placement, and repair of right femoral vein.

## 2024-09-09 NOTE — PHYSICAL EXAM
[de-identified] : Constitutional o Appearance : well-nourished, well developed, alert, in no acute distress  Head and Face o Head :  Inspection : atraumatic, normocephalic o Face :  Inspection : no visible rash or discoloration Respiratory o Respiratory Effort: breathing unlabored  Neurologic o Mental Status Examination :  Orientation : grossly oriented to person, place and time Psychiatric o Mood and Affect: mood normal, affect appropriate   Lumbosacral Spine: Musculoskeletal Examination  Inspection : no visible rash, no deformities  Palpation : bilateral paraspinal musculature tenderness to palpation.   Stability : no subluxations present  Range of Motion : limited and pain throughout arc of motion in all planes  Muscle Strength : paraspinal muscle strength and tone within normal limits  Muscle Tone : paraspinal muscle strength and tone within normal limits  Tests/Signs : Straight Leg Raise Test (-) bilaterally. Patellar and Achilles Reflexes (2+) bilaterally.  Lower Extremity Muscle Strength : hip flexion 5/5, knee flexion 5/5, ankle dorsiflexion 5/5, plantar flexion 5/5, EHL 5/5  Right Lower Extremity o Buttock : no tenderness, swelling or deformities  o Right Hip :  Inspection/Palpation : no tenderness, swelling or deformities  Range of Motion : full and limited rotation, no crepitance  Stability : joint stability intact  Strength : extension, flexion, adduction, abduction, internal rotation and external rotation 5/5   o Right Knee :  Inspection/Palpation : medial compartment tenderness and no lateral compartment tenderness to palpation, lateral facet tenderness , no swelling, mild warmth, mild valgus deformity   Range of Motion : 0-120 degrees, pain on further flexion, mild crepitance, good patellofemoral glide   Stability : no valgus or varus instability present on provocative testing  Strength : flexion and extension 5/5  Tests and Signs : Anterior Drawer negative, Lachman negative, Keegan's negative  Left Lower Extremity o Buttock : no tenderness, swelling or deformities  o Left Hip :  Inspection/Palpation : no tenderness, no swelling or deformities  Range of Motion : full and painless in all planes, no crepitance  Stability : joint stability intact  Strength : extension, flexion, adduction, abduction, internal rotation and external rotation 5/5  o Left Knee :  Inspection/Palpation : no tenderness to palpation, no swelling  Range of Motion : active flexion and extension full and painless, no crepitance  Stability : no valgus or varus instability present on provocative testing  Strength : flexion and extension 5/5  Tests and Signs : Anterior Drawer negative, Lachman negative, Keegan's negative  Gait: limited core strength, decrease sensation of right calf, sensation WNL on Left, no foot drop, no significant extremity swelling or lymphedema, no foot drop  [de-identified] : Patient comes to today's visit with outside imaging already performed. I reviewed the images in detail with the patient and discussed the findings as highlighted below.  [facility] 	 EXAM: [id number] - MR SPINE LUMBAR  - ORDERED BY: NADER BETANCUR   PROCEDURE DATE:  08/10/2024    INTERPRETATION:  CLINICAL INFORMATION: Low back pain  ADDITIONAL CLINICAL INFORMATION: Not Applicable  TECHNIQUE: Multiplanar, multisequence MRI was performed of the lumbar spine. IV Contrast: NONE  PRIOR STUDIES: No priors available for comparison.  FINDINGS:  BONES: No fracture. ALIGNMENT: Mild broad levocurvature. Transitional partially sacralized L5 with fusion with the sacrum on the right. SACROILIAC JOINTS/SACRUM: There is no sacral fracture. The SI joints are partially visualized but are intact. CONUS AND CAUDA EQUINA: The distal cord and conus are normal in signal. Conus terminates at T12-L1. VISUALIZED INTRAPELVIC/INTRA-ABDOMINAL SOFT TISSUES: Colonic diverticulosis. Infrarenal abdominal aortic aneurysm measuring up to 5.5 cm. Atrophic left kidney with several small cysts. Suggestion of mineralization at the left renal pelvis. Multiple left perirenal cysts and partially included large cyst at the upper pole. PARASPINAL SOFT TISSUES: Normal.   INDIVIDUAL LEVELS: Mild multilevel loss of disc heights and disc signal. Mild epidural lipomatosis.  LOWER THORACIC SPINE: No spinal canal or neuroforaminal stenosis.  L1-L2: Mild disc bulge. L2-L3: Mild disc bulge. Minimal bilateral foraminal narrowing. L3-L4: Diffuse disc bulge. Bilateral facet arthrosis with large effusions. Severe spinal canal stenosis. Moderate right greater than left foraminal narrowing. L4-L5: Diffuse disc bulge. Bulky bilateral facet arthrosis with effusions. Ligamentum flavum thickening. Moderate spinal canal stenosis. Moderate bilateral foraminal narrowing. L5-S1: Mild bilateral facet arthrosis. Mild left foraminal narrowing.   IMPRESSION:  *  Moderate multilevel lumbar spondylosis, notably at L3-L4 with severe spinal canal and moderate foraminal narrowing and L4-L5 with moderate spinal canal and moderate foraminal narrowing. *  Transitional partially sacralized L5.  --- End of Report ---     -----------------------------------------------------------------------------------------------------------------------------------------------------------------------------------  Radiology Results 7/18/2024  o Lumbosacral Spine: AP and lateral views were obtained and revealed degenerative disc disease with grade 1 spondylolisthesis of L4-5 and foraminal stenosis at L4-5 and L5-S1  o Pelvis: AP pelvis was obtained and revealed hip replacement in good position vascular graft consistent with vascular bypass surgery

## 2024-09-09 NOTE — DISCUSSION/SUMMARY
[de-identified] : The underlying pathophysiology was reviewed in great detail with the patient as well as the various treatment options, including ice, analgesics, NSAIDs, Physical therapy, steroid injections, spine specialist referral, AILEEN, pain management,   MRI of the lumbar spine revealed Moderate multilevel lumbar spondylosis, notably at L3-L4 with severe spinal canal and moderate foraminal narrowing and L4-L5 with moderate spinal canal and moderate foraminal narrowing and Transitional partially sacralized L5. reviewed and discussed in great detail today. Results were reviewed and discussed with the patient today. Discussed s/sx to monitor for. Advised sooner follow up if any changes in symptoms. Diagnosis, prognosis, natural history and treatment was discussed with patient. Patient was advised if the following symptoms develop: chills, fever, loss of bladder control, bowel incontinence or urinary retention, numbness/tingling or weakness is present in upper or lower extremities, to go to the nearest emergency room. This may be a new clinical condition does not present at the time of the patient visit that may lead to paralysis and/or death, Patient advised if the above symptoms developed to also call the office immediately to inform us and to go to the nearest emergency room.  Advised patient to follow up with his nephrologist regarding the incidental findings found on his kidneys. Patient has PMHx of CKD.   Advised patient to follow up with his cardiothoracic surgeon / cardiologist/ vascular surgeon regarding incidental findings of abdominal aneurysm. Patient has known aneurysm and is being followed by MD.   A prescription for Physical Therapy was provided.  Activity modifications and restrictions were discussed.   Reiterated that the patient is on Eliquis and should not be taking nonsteroidal anti-inflammatory medications for fear of bleeding.  FU 6 weeks  All questions were answered, all alternatives discussed and the patient is in complete agreement with that plan. Follow-up appointment as instructed. Any issues and the patient will call or come in sooner

## 2024-09-17 ENCOUNTER — APPOINTMENT (OUTPATIENT)
Dept: NEPHROLOGY | Facility: CLINIC | Age: 79
End: 2024-09-17
Payer: MEDICARE

## 2024-09-17 VITALS
DIASTOLIC BLOOD PRESSURE: 85 MMHG | TEMPERATURE: 97.4 F | HEART RATE: 64 BPM | WEIGHT: 207 LBS | HEIGHT: 66 IN | OXYGEN SATURATION: 97 % | BODY MASS INDEX: 33.27 KG/M2 | SYSTOLIC BLOOD PRESSURE: 136 MMHG

## 2024-09-17 DIAGNOSIS — N26.1 ATROPHY OF KIDNEY (TERMINAL): ICD-10-CM

## 2024-09-17 DIAGNOSIS — E66.9 OBESITY, UNSPECIFIED: ICD-10-CM

## 2024-09-17 DIAGNOSIS — I71.40 ABDOMINAL AORTIC ANEURYSM, WITHOUT RUPTURE, UNSPECIFIED: ICD-10-CM

## 2024-09-17 DIAGNOSIS — E78.5 HYPERLIPIDEMIA, UNSPECIFIED: ICD-10-CM

## 2024-09-17 DIAGNOSIS — N18.32 CHRONIC KIDNEY DISEASE, STAGE 3B: ICD-10-CM

## 2024-09-17 PROCEDURE — 99213 OFFICE O/P EST LOW 20 MIN: CPT

## 2024-09-17 RX ORDER — METOPROLOL SUCCINATE 25 MG/1
25 TABLET, EXTENDED RELEASE ORAL
Qty: 90 | Refills: 0 | Status: DISCONTINUED | COMMUNITY
Start: 2024-06-25 | End: 2024-09-17

## 2024-09-18 LAB
ALBUMIN SERPL ELPH-MCNC: 3.8 G/DL
ANION GAP SERPL CALC-SCNC: 12 MMOL/L
APPEARANCE: CLEAR
BACTERIA: NEGATIVE /HPF
BILIRUBIN URINE: NEGATIVE
BLOOD URINE: NEGATIVE
BUN SERPL-MCNC: 29 MG/DL
CALCIUM SERPL-MCNC: 9.4 MG/DL
CAST: 0 /LPF
CHLORIDE SERPL-SCNC: 107 MMOL/L
CO2 SERPL-SCNC: 23 MMOL/L
COLOR: YELLOW
CREAT SERPL-MCNC: 1.45 MG/DL
EGFR: 50 ML/MIN/1.73M2
EPITHELIAL CELLS: 2 /HPF
ESTIMATED AVERAGE GLUCOSE: 114 MG/DL
GLUCOSE QUALITATIVE U: NEGATIVE MG/DL
GLUCOSE SERPL-MCNC: 85 MG/DL
HBA1C MFR BLD HPLC: 5.6 %
KETONES URINE: NEGATIVE MG/DL
LEUKOCYTE ESTERASE URINE: NEGATIVE
MICROSCOPIC-UA: NORMAL
NITRITE URINE: NEGATIVE
PH URINE: 5.5
PHOSPHATE SERPL-MCNC: 3.1 MG/DL
POTASSIUM SERPL-SCNC: 5.2 MMOL/L
PROTEIN URINE: NEGATIVE MG/DL
RED BLOOD CELLS URINE: 5 /HPF
SODIUM SERPL-SCNC: 142 MMOL/L
SPECIFIC GRAVITY URINE: 1.03
UROBILINOGEN URINE: 0.2 MG/DL
WHITE BLOOD CELLS URINE: 0 /HPF

## 2024-09-18 NOTE — HISTORY OF PRESENT ILLNESS
[FreeTextEntry1] : A case of CKD with hypertension, hyperlipidemia, s/p aortic stent placement, and repair of right femoral vein. The patient has been started on Xarelto 5 mg for clot in aortic aneurysm. He has stopped taking BP meds because he felt dizzy. The patient has come for a follow-up of CKD. During the last follow-up, serum creatinine improved to 1.61 mg/dl with increased GFR to 44 ml/min. Weight is stable. BP is controlled. Advised renal panel, CBC, lipid profile and urine. Lab tests were discussed with the patient. Renal function stable. Blood sugar is controlled. RTC 3 months. September 17, 2024 Patient complains of backache.

## 2024-09-18 NOTE — ASSESSMENT
[FreeTextEntry1] : A case of CKD with hypertension, hyperlipidemia, s/p aortic stent placement, and repair of right femoral vein. The patient has been started on Xarelto 5 mg for clot in aortic aneurysm. He has stopped taking BP meds because he felt dizzy. The patient has come for a follow-up of CKD. During the last follow-up, serum creatinine improved to 1.61 mg/dl with increased GFR to 44 ml/min. Weight is stable. BP is controlled. Advised renal panel, CBC, lipid profile and urine. Lab tests were discussed with the patient. Renal function stable. Blood sugar is controlled. RTC 3 months. September 17, 2024 Patient complains of backache. Patient complains of dizziness and lowering of BP advised to stop Valsartan/HCTZ.  Advised to start Farxiga 5 mg PO daily.  Lab results were discussed with the patient. Renal functions were stable. RTC 3 months.

## 2024-09-19 LAB — TSH SERPL-ACNC: 2.12 UIU/ML

## 2024-09-20 RX ORDER — DAPAGLIFLOZIN 5 MG/1
5 TABLET, FILM COATED ORAL DAILY
Qty: 90 | Refills: 3 | Status: ACTIVE | COMMUNITY
Start: 2024-09-17 | End: 1900-01-01

## 2024-09-27 ENCOUNTER — RX RENEWAL (OUTPATIENT)
Age: 79
End: 2024-09-27

## 2024-10-21 ENCOUNTER — APPOINTMENT (OUTPATIENT)
Dept: ORTHOPEDIC SURGERY | Facility: CLINIC | Age: 79
End: 2024-10-21
Payer: MEDICARE

## 2024-10-21 VITALS — BODY MASS INDEX: 33.27 KG/M2 | WEIGHT: 207 LBS | HEIGHT: 66 IN

## 2024-10-21 DIAGNOSIS — M17.12 UNILATERAL PRIMARY OSTEOARTHRITIS, LEFT KNEE: ICD-10-CM

## 2024-10-21 DIAGNOSIS — M17.0 BILATERAL PRIMARY OSTEOARTHRITIS OF KNEE: ICD-10-CM

## 2024-10-21 PROCEDURE — 99213 OFFICE O/P EST LOW 20 MIN: CPT

## 2024-12-16 ENCOUNTER — APPOINTMENT (OUTPATIENT)
Dept: NEPHROLOGY | Facility: CLINIC | Age: 79
End: 2024-12-16
Payer: MEDICARE

## 2024-12-16 ENCOUNTER — NON-APPOINTMENT (OUTPATIENT)
Age: 79
End: 2024-12-16

## 2024-12-16 VITALS
HEART RATE: 82 BPM | TEMPERATURE: 97.3 F | SYSTOLIC BLOOD PRESSURE: 143 MMHG | DIASTOLIC BLOOD PRESSURE: 73 MMHG | WEIGHT: 201 LBS | HEIGHT: 66 IN | OXYGEN SATURATION: 97 % | BODY MASS INDEX: 32.3 KG/M2

## 2024-12-16 DIAGNOSIS — N26.1 ATROPHY OF KIDNEY (TERMINAL): ICD-10-CM

## 2024-12-16 DIAGNOSIS — R60.0 LOCALIZED EDEMA: ICD-10-CM

## 2024-12-16 DIAGNOSIS — N18.32 CHRONIC KIDNEY DISEASE, STAGE 3B: ICD-10-CM

## 2024-12-16 PROCEDURE — 99213 OFFICE O/P EST LOW 20 MIN: CPT

## 2024-12-16 RX ORDER — CLOPIDOGREL BISULFATE 75 MG/1
75 TABLET, FILM COATED ORAL
Qty: 30 | Refills: 4 | Status: ACTIVE | COMMUNITY
Start: 2024-12-16

## 2024-12-17 LAB
ALBUMIN SERPL ELPH-MCNC: 4 G/DL
ANION GAP SERPL CALC-SCNC: 11 MMOL/L
APPEARANCE: CLEAR
BACTERIA: NEGATIVE /HPF
BASOPHILS # BLD AUTO: 0.05 K/UL
BASOPHILS NFR BLD AUTO: 0.9 %
BILIRUBIN URINE: NEGATIVE
BLOOD URINE: NEGATIVE
BUN SERPL-MCNC: 27 MG/DL
CALCIUM SERPL-MCNC: 9 MG/DL
CAST: 0 /LPF
CHLORIDE SERPL-SCNC: 105 MMOL/L
CO2 SERPL-SCNC: 24 MMOL/L
COLOR: YELLOW
CREAT SERPL-MCNC: 1.42 MG/DL
EGFR: 51 ML/MIN/1.73M2
EOSINOPHIL # BLD AUTO: 0.11 K/UL
EOSINOPHIL NFR BLD AUTO: 2 %
EPITHELIAL CELLS: 1 /HPF
GLUCOSE QUALITATIVE U: NEGATIVE MG/DL
GLUCOSE SERPL-MCNC: 95 MG/DL
HCT VFR BLD CALC: 39.3 %
HGB BLD-MCNC: 12.4 G/DL
IMM GRANULOCYTES NFR BLD AUTO: 0.4 %
KETONES URINE: NEGATIVE MG/DL
LEUKOCYTE ESTERASE URINE: ABNORMAL
LYMPHOCYTES # BLD AUTO: 0.92 K/UL
LYMPHOCYTES NFR BLD AUTO: 17 %
MAN DIFF?: NORMAL
MCHC RBC-ENTMCNC: 30.2 PG
MCHC RBC-ENTMCNC: 31.6 G/DL
MCV RBC AUTO: 95.6 FL
MICROSCOPIC-UA: NORMAL
MONOCYTES # BLD AUTO: 0.43 K/UL
MONOCYTES NFR BLD AUTO: 7.9 %
NEUTROPHILS # BLD AUTO: 3.88 K/UL
NEUTROPHILS NFR BLD AUTO: 71.8 %
NITRITE URINE: NEGATIVE
PH URINE: 5.5
PHOSPHATE SERPL-MCNC: 2.5 MG/DL
PLATELET # BLD AUTO: 161 K/UL
POTASSIUM SERPL-SCNC: 4.2 MMOL/L
PROTEIN URINE: NEGATIVE MG/DL
RBC # BLD: 4.11 M/UL
RBC # FLD: 14.7 %
RED BLOOD CELLS URINE: 1 /HPF
SODIUM SERPL-SCNC: 140 MMOL/L
SPECIFIC GRAVITY URINE: 1.02
UROBILINOGEN URINE: 0.2 MG/DL
WBC # FLD AUTO: 5.41 K/UL
WHITE BLOOD CELLS URINE: 1 /HPF

## 2024-12-18 ENCOUNTER — APPOINTMENT (OUTPATIENT)
Dept: INTERNAL MEDICINE | Facility: CLINIC | Age: 79
End: 2024-12-18
Payer: MEDICARE

## 2024-12-18 VITALS
DIASTOLIC BLOOD PRESSURE: 80 MMHG | HEIGHT: 66 IN | HEART RATE: 87 BPM | TEMPERATURE: 97.6 F | OXYGEN SATURATION: 96 % | WEIGHT: 199 LBS | SYSTOLIC BLOOD PRESSURE: 118 MMHG | BODY MASS INDEX: 31.98 KG/M2

## 2024-12-18 DIAGNOSIS — Z00.00 ENCOUNTER FOR GENERAL ADULT MEDICAL EXAMINATION W/OUT ABNORMAL FINDINGS: ICD-10-CM

## 2024-12-18 DIAGNOSIS — Z23 ENCOUNTER FOR IMMUNIZATION: ICD-10-CM

## 2024-12-18 DIAGNOSIS — R73.09 OTHER ABNORMAL GLUCOSE: ICD-10-CM

## 2024-12-18 DIAGNOSIS — I10 ESSENTIAL (PRIMARY) HYPERTENSION: ICD-10-CM

## 2024-12-18 DIAGNOSIS — E78.5 HYPERLIPIDEMIA, UNSPECIFIED: ICD-10-CM

## 2024-12-18 PROCEDURE — G0439: CPT

## 2024-12-18 PROCEDURE — G0008: CPT

## 2024-12-18 PROCEDURE — 36415 COLL VENOUS BLD VENIPUNCTURE: CPT

## 2024-12-18 PROCEDURE — 90662 IIV NO PRSV INCREASED AG IM: CPT

## 2024-12-19 LAB
25(OH)D3 SERPL-MCNC: 32.9 NG/ML
ANION GAP SERPL CALC-SCNC: 15 MMOL/L
BUN SERPL-MCNC: 27 MG/DL
CALCIUM SERPL-MCNC: 9.1 MG/DL
CHLORIDE SERPL-SCNC: 107 MMOL/L
CHOLEST SERPL-MCNC: 162 MG/DL
CO2 SERPL-SCNC: 19 MMOL/L
CREAT SERPL-MCNC: 1.48 MG/DL
EGFR: 48 ML/MIN/1.73M2
ESTIMATED AVERAGE GLUCOSE: 111 MG/DL
GLUCOSE SERPL-MCNC: 112 MG/DL
HBA1C MFR BLD HPLC: 5.5 %
HDLC SERPL-MCNC: 41 MG/DL
LDLC SERPL CALC-MCNC: 100 MG/DL
NONHDLC SERPL-MCNC: 120 MG/DL
POTASSIUM SERPL-SCNC: 4.4 MMOL/L
PSA SERPL-MCNC: 2.68 NG/ML
SODIUM SERPL-SCNC: 141 MMOL/L
T4 FREE SERPL-MCNC: 1.2 NG/DL
TRIGL SERPL-MCNC: 111 MG/DL
TSH SERPL-ACNC: 3.51 UIU/ML
VIT B12 SERPL-MCNC: 342 PG/ML

## 2024-12-30 ENCOUNTER — RX RENEWAL (OUTPATIENT)
Age: 79
End: 2024-12-30

## 2025-02-09 NOTE — H&P PST ADULT - NS MD HP INPLANTS MED DEV
PROCEDURES:  Insertion of intravenous catheter with ultrasound guidance 04-Feb-2025 07:59:15  Pascual Harrell  
PROCEDURES:  Insertion, nasogastric tube 09-Feb-2025 17:11:04  Pascual Harrell  
PROCEDURES:  Insertion of intravenous catheter with ultrasound guidance 04-Feb-2025 07:59:15  Pascual Harrell  Phlebotomy 07-Feb-2025 16:47:04  Pascual Harrell  
PROCEDURES:  Arterial puncture, for blood collection 09-Feb-2025 18:30:08  Pascual Harrell  
bilateral hips/Artificial joint

## 2025-04-22 ENCOUNTER — APPOINTMENT (OUTPATIENT)
Dept: ORTHOPEDIC SURGERY | Facility: CLINIC | Age: 80
End: 2025-04-22
Payer: MEDICARE

## 2025-04-22 VITALS — BODY MASS INDEX: 31.98 KG/M2 | WEIGHT: 199 LBS | HEIGHT: 66 IN

## 2025-04-22 DIAGNOSIS — M17.11 UNILATERAL PRIMARY OSTEOARTHRITIS, RIGHT KNEE: ICD-10-CM

## 2025-04-22 PROCEDURE — 99214 OFFICE O/P EST MOD 30 MIN: CPT | Mod: 25

## 2025-04-22 PROCEDURE — 73562 X-RAY EXAM OF KNEE 3: CPT | Mod: 50

## 2025-04-22 PROCEDURE — 20610 DRAIN/INJ JOINT/BURSA W/O US: CPT | Mod: RT

## 2025-04-29 ENCOUNTER — APPOINTMENT (OUTPATIENT)
Dept: ORTHOPEDIC SURGERY | Facility: CLINIC | Age: 80
End: 2025-04-29
Payer: MEDICARE

## 2025-04-29 PROCEDURE — 20610 DRAIN/INJ JOINT/BURSA W/O US: CPT | Mod: RT

## 2025-05-06 ENCOUNTER — APPOINTMENT (OUTPATIENT)
Dept: ORTHOPEDIC SURGERY | Facility: CLINIC | Age: 80
End: 2025-05-06
Payer: MEDICARE

## 2025-05-06 PROCEDURE — 20610 DRAIN/INJ JOINT/BURSA W/O US: CPT | Mod: RT

## 2025-05-13 ENCOUNTER — APPOINTMENT (OUTPATIENT)
Dept: ORTHOPEDIC SURGERY | Facility: CLINIC | Age: 80
End: 2025-05-13
Payer: MEDICARE

## 2025-05-13 DIAGNOSIS — M17.0 BILATERAL PRIMARY OSTEOARTHRITIS OF KNEE: ICD-10-CM

## 2025-05-13 PROCEDURE — 20610 DRAIN/INJ JOINT/BURSA W/O US: CPT | Mod: RT

## 2025-06-12 ENCOUNTER — RX RENEWAL (OUTPATIENT)
Age: 80
End: 2025-06-12

## 2025-06-16 ENCOUNTER — APPOINTMENT (OUTPATIENT)
Dept: NEPHROLOGY | Facility: CLINIC | Age: 80
End: 2025-06-16

## 2025-06-24 ENCOUNTER — APPOINTMENT (OUTPATIENT)
Dept: ORTHOPEDIC SURGERY | Facility: CLINIC | Age: 80
End: 2025-06-24

## 2025-07-14 NOTE — DISCHARGE NOTE PROVIDER - NSDCFUSCHEDAPPT_GEN_ALL_CORE_FT
History Of Present Illness  Xochitl Pichardo is a 61 y.o. female presenting for colonoscopy history of colon cancer.     Past Medical History  Medical History[1]    Surgical History  Surgical History[2]     Social History  She reports that she has never smoked. She has never used smokeless tobacco. She reports that she does not currently use alcohol. She reports that she does not use drugs.    Family History  Family History[3]     Allergies  Golytely [peg 3350-electrolytes]    Review of Systems     Physical Exam  Eyes:      Extraocular Movements: Extraocular movements intact.      Pupils: Pupils are equal, round, and reactive to light.   Cardiovascular:      Rate and Rhythm: Normal rate and regular rhythm.   Pulmonary:      Effort: Pulmonary effort is normal.   Abdominal:      Palpations: Abdomen is soft.   Skin:     General: Skin is warm and dry.   Neurological:      Mental Status: She is alert.   Psychiatric:         Mood and Affect: Mood normal.         Behavior: Behavior normal.          Last Recorded Vitals  There were no vitals taken for this visit.    Relevant Results             Assessment & Plan  Malignant neoplasm of colon, unspecified part of colon (Multi)      Here for colonoscopy    I spent 15 minutes in the professional and overall care of this patient.      Estrellita Nguyen MD         [1]   Past Medical History:  Diagnosis Date    Colon cancer (Multi)     Colorectal cancer (Multi)     Dental disease     chipped    Hypothyroidism     Murmur     Other specified disorders of the skin and subcutaneous tissue 04/13/2019    Morgellons syndrome    Other specified disorders of the skin and subcutaneous tissue 04/23/2019    Morgellons syndrome    Pelvic mass     Vision loss    [2]   Past Surgical History:  Procedure Laterality Date    COLONOSCOPY      OTHER SURGICAL HISTORY  10/14/2023    loop ostomy   [3]   Family History  Problem Relation Name Age of Onset    Parkinsonism Mother      Miranda Parkinson Avi  syndrome Mother      Heart disease Father      Heart disease Brother      Heart disease Paternal Grandfather        ELLIOTT CANCINO ; 12/27/2021 ; [place] [address] Epifaniol

## 2025-09-09 ENCOUNTER — APPOINTMENT (OUTPATIENT)
Dept: ORTHOPEDIC SURGERY | Facility: CLINIC | Age: 80
End: 2025-09-09
Payer: MEDICARE

## 2025-09-09 DIAGNOSIS — M17.0 BILATERAL PRIMARY OSTEOARTHRITIS OF KNEE: ICD-10-CM

## 2025-09-09 PROCEDURE — 20610 DRAIN/INJ JOINT/BURSA W/O US: CPT | Mod: RT

## 2025-09-09 PROCEDURE — 99213 OFFICE O/P EST LOW 20 MIN: CPT | Mod: 25

## 2025-09-09 RX ORDER — HYALURONATE SODIUM 20 MG/2 ML
20 SYRINGE (ML) INTRAARTICULAR
Qty: 1 | Refills: 0 | Status: ACTIVE | COMMUNITY
Start: 2025-09-09

## 2025-09-13 ENCOUNTER — RX RENEWAL (OUTPATIENT)
Age: 80
End: 2025-09-13